# Patient Record
Sex: FEMALE | Race: WHITE | Employment: FULL TIME | URBAN - METROPOLITAN AREA
[De-identification: names, ages, dates, MRNs, and addresses within clinical notes are randomized per-mention and may not be internally consistent; named-entity substitution may affect disease eponyms.]

---

## 2017-01-04 ENCOUNTER — TELEPHONE (OUTPATIENT)
Dept: NEUROLOGY | Age: 52
End: 2017-01-04

## 2017-01-09 ENCOUNTER — DOCUMENTATION ONLY (OUTPATIENT)
Dept: NEUROLOGY | Age: 52
End: 2017-01-09

## 2017-01-16 ENCOUNTER — TELEPHONE (OUTPATIENT)
Dept: NEUROLOGY | Age: 52
End: 2017-01-16

## 2017-01-16 NOTE — TELEPHONE ENCOUNTER
Spoke with Sabas Baum at [employer]. Botox 200 units  is scheduled for delivery on 1/18/17.

## 2017-01-18 ENCOUNTER — DOCUMENTATION ONLY (OUTPATIENT)
Dept: NEUROLOGY | Age: 52
End: 2017-01-18

## 2017-01-19 ENCOUNTER — OFFICE VISIT (OUTPATIENT)
Dept: NEUROLOGY | Age: 52
End: 2017-01-19

## 2017-01-19 VITALS
DIASTOLIC BLOOD PRESSURE: 62 MMHG | WEIGHT: 179 LBS | RESPIRATION RATE: 20 BRPM | BODY MASS INDEX: 30.56 KG/M2 | HEIGHT: 64 IN | SYSTOLIC BLOOD PRESSURE: 110 MMHG

## 2017-01-19 NOTE — PROGRESS NOTES
[address]  OFFICE PROCEDURE PROGRESS NOTE        Chart reviewed for the following:   Neel Baxter MD, have reviewed the History, Physical and updated the Allergic reactions for [address] performed immediately prior to start of procedure:   I, Zoila Romo MD, have performed the following reviews on Olita Libman prior to the start of the procedure:            * Patient was identified by name and date of birth   * Agreement on procedure being performed was verified  * Risks and Benefits explained to the patient  * Procedure site verified and marked as necessary  * Patient was positioned for comfort  * Consent was signed and verified     Time: 09 20      Date of procedure: 1/19/2017    Procedure performed by:  Zoila Romo MD    Provider assisted by: None    Patient assisted by: None    How tolerated by patient: tolerated the procedure well with no complications    Post Procedural Pain Scale: 4 - Hurts Little More    Comments: None      Botox Injection Note       Indication: patient has chronic recurrent migraine, has 7-10 less migraine days per month with botox injections, previously failed depakote, topamax, lyrica, SSRIs, nortriptyline, and nicardipine (Ca ch blocker)    Procedure:   Botox concentration: 200 units in 4 ml of preservative-free normal saline. 31 sites injections, distribution as follow      Units/site  Sites Sides Subtotal    Procerus 5 1 1 5    5 1 2 10   Frontalis 5 2 2 20   Temporalis 5 4 2 40   Occipitalis 5 3 2 30   Upper cervical paraspinalis 5 2 2 20   Trapezius 5 3 2 30         200 units Botox were reconstituted, 155 units injected as above and the remainder was unavoidably wasted.      Patient tolerated procedure well.     _____________________________   Clover Pennington M.D.

## 2017-01-19 NOTE — MR AVS SNAPSHOT
Visit Information Date & Time Provider Department Dept. Phone Encounter #  
 1/19/2017  9:20 AM Collette Gaucher, MD [facility] [phone] [id number] Your Appointments 11/15/2017 10:30 AM  
Follow Up with Suzanne Andrade NP [facility] [address]) Appt Note: f/u migraines, $0cp, jrb 11/16/16  
 [address] [place] [address], 
[address] [place] [address], [address] [phone]  
[address] [facility], [address] [facility], [address] [phone] Upcoming Health Maintenance Date Due  
 PAP AKA CERVICAL CYTOLOGY 7/11/2019 DTaP/Tdap/Td series (2 - Td) 10/24/2022 Allergies as of 1/19/2017  Review Complete On: 1/19/2017 By: Jeff Laguna LPN Severity Noted Reaction Type Reactions Monistat 1 [Tioconazole]  10/24/2012    Swelling Prozac [Fluoxetine]  10/24/2016   Side Effect Other (comments) \"felt like having a heart attack\" Sulfa Dyne  10/24/2012    Nausea and Vomiting Current Immunizations  Never Reviewed Name Date Influenza Vaccine 10/28/2016 TDAP Vaccine 10/24/2012  6:47 PM  
  
 Not reviewed this visit Vitals BP Resp Height(growth percentile) Weight(growth percentile) BMI OB Status 110/62 20 5' 4\" (1.626 m) 179 lb (81.2 kg) 30.73 kg/m2 Hysterectomy Smoking Status Former Smoker BMI and BSA Data Body Mass Index Body Surface Area 30.73 kg/m 2 1.91 m 2 Preferred Pharmacy Pharmacy Name Phone [phone], [address] [phone] Your Updated Medication List  
  
   
This list is accurate as of: 1/19/17 10:11 AM.  Always use your most recent med list.  
  
  
  
  
 baclofen 10 mg tablet Commonly known as:  LIORESAL  
take 1 tablet by mouth three times a day  
  
 citalopram 20 mg tablet Commonly known as:  [place] Party Take 1 Tab by mouth daily. FISH OIL 1,000 mg Cap Generic drug:  omega-3 fatty acids-vitamin e Take 1 Cap by mouth.  
  
 levothyroxine 75 mcg tablet Commonly known as:  SYNTHROID  
TAKE 1 TABLET BY MOUTH ONCE DAILY BEFORE BREAKFAST LINZESS 145 mcg Cap capsule Generic drug:  linaclotide TAKE 1 CAPSULE BY MOUTH ONCE DAILY BEFORE BREAKFAST. LYRICA 100 mg capsule Generic drug:  pregabalin TAKE 1 CAPSULE BY MOUTH TWICE DAILY  
  
 niCARdipine 30 mg capsule Commonly known as:  CARDENE  
take 1 capsule by mouth three times a day  
  
 onabotulinumtoxinA 200 unit injection Commonly known as:  BOTOX Inject 200 units IM over 31 sites at head and neck every 12 weeks. Migraine Prevention. Inject IM neck/face , 31 FDA approved sites  Indications: MIGRAINE PREVENTION  
  
 SUMAtriptan 100 mg tablet Commonly known as:  IMITREX  
take 1/2 to 1 tablet by mouth AT ONSET OF HEADACHE. REPEAT AT 2 HOURS IF NEEDED, MAX 200MG IN 24 HOURS  
  
 TROKENDI XR 50 mg capsule Generic drug:  topiramate ER  
TAKE 1 CAPSULE BY MOUTH ONCE DAILY. Patient Instructions OnabotulinumtoxinA (By injection) OnabotulinumtoxinA (ef-s-kfn-rg-MSI-hyx-tox-in-ay) Treats muscle stiffness, muscle spasms, excessive sweating, overactive bladder, or loss of bladder control. Prevents chronic migraine headaches. Improves the appearance of wrinkles on the face. Brand Name(s):Botox, Botox Cosmetic There may be other brand names for this medicine. When This Medicine Should Not Be Used: This medicine is not right for everyone. You should not receive this medicine if you had an allergic reaction to onabotulinumtoxinA or any other botulinum toxin product. How to Use This Medicine:  
Injectable · Your doctor will prescribe your exact dose and tell you how often it should be given. This medicine is given by a healthcare provider as a shot under your skin or into a muscle.  
· You may be given medicine to numb the area where the shot will be injected. If you receive the medicine around your eyes, you may be given eye drops or ointment to numb the area. After your injection, you may need to wear a protective contact lens or eye patch. · If you are being treated for excessive sweating, shave your underarms but do not use deodorant for 24 hours before your injection. Avoid exercise, hot foods or liquids, or anything else that could make you sweat for 30 minutes before your injection. · The recommended treatment schedule for chronic migraine is every 12 weeks. · This medicine works slowly. Once your condition has improved, the medicine will last about 3 months, then the effects will slowly go away. You might need more injections to treat your condition. ¨ Muscle spasms in the eyelids should improve within 3 to 10 days. ¨ Eye muscle problems should improve 1 or 2 days after the injection, and the improvement should last for 2 to 6 weeks. ¨ Neck pain should improve within 2 to 6 weeks. ¨ Arm stiffness should improve within 4 to 6 weeks. ¨ Facial lines or wrinkles should improve 1 or 2 days. · This medicine should come with a Medication Guide. Ask your pharmacist for a copy if you do not have one. · Missed dose:Call your doctor or pharmacist for instructions. Drugs and Foods to Avoid: Ask your doctor or pharmacist before using any other medicine, including over-the-counter medicines, vitamins, and herbal products. · Some foods and medicine can affect how onabotulinumtoxinA works. Tell your doctor if you are using any of the following: ¨ Aspirin or a blood thinner (such as ticlopidine, warfarin) ¨ Muscle relaxer ¨ Medicine for an infection (such as amikacin, gentamicin, streptomycin, tobramycin) · Tell your doctor if you have received an injection of any botulinum toxin product within the past 4 months. Warnings While Using This Medicine: · Tell your doctor if you are pregnant or breastfeeding, or if you have breathing or lung problems, bleeding problems, heart or blood vessel disease, or nerve or muscle problems (such as myasthenia gravis). Tell your doctor if you have ever had face surgery or if you have a urinary tract infection or trouble urinating, diabetes, or multiple sclerosis. · This medicine may cause the following problems: ¨ Muscle weakness, loss of bladder control, trouble swallowing, speaking, or breathing (caused by the toxin spreading to other parts of your body) · This medicine may make your muscles weak or cause vision problems. Do not drive or do anything else that could be dangerous until you know how this medicine affects you. · There are some warnings that only apply if you are receiving this medicine to treat the following: ¨ Injections near the eye: This medicine may reduce blinking, which can raise the risk of eye problems such as corneal exposure and ulcers. Tell your doctor right away if you notice that you are blinking less than usual or your eyes feel dry. ¨ Urinary incontinence: This medicine may cause autonomic dysreflexia, which can be a life-threatening condition. ¨ Overactive bladder: Check with your doctor right away if you have trouble urinating or a burning sensation while urinating. · This medicine contains products from donated human blood, so it may contain viruses, although the risk is low. Human donors and blood are always tested for viruses to keep the risk low. Talk with your doctor about this risk if you are concerned. · Your doctor will check your progress and the effects of this medicine at regular visits. Keep all appointments. Possible Side Effects While Using This Medicine:  
Call your doctor right away if you notice any of these side effects: · Allergic reaction: Itching or hives, swelling in your face or hands, swelling or tingling in your mouth or throat, chest tightness, trouble breathing · Blurred or double vision, droopy eyelids · Change in how much or how often you urinate, trouble urinating, or painful urination · Chest pain, slow or uneven heartbeat · Headache, increased sweating, warmth or redness in your face, neck, or arm · Muscle weakness · Trouble swallowing, talking, or breathing If you notice these less serious side effects, talk with your doctor: · Fever, chills, cough, stuffy or runny nose, sore throat, and body aches · Pain in your neck, back, arms, or legs · Redness, pain, tenderness, bruising, swelling, or weakness where the shot was given If you notice other side effects that you think are caused by this medicine, tell your doctor. Call your doctor for medical advice about side effects. You may report side effects to FDA at 5-390-FDA-2768 © 2016 [address] is for End User's use only and may not be sold, redistributed or otherwise used for commercial purposes. The above information is an  only. It is not intended as medical advice for individual conditions or treatments. Talk to your doctor, nurse or pharmacist before following any medical regimen to see if it is safe and effective for you. Introducing [facility]! Dear [facility]: Thank you for requesting a [employer] account. Our records indicate that you already have an active [employer] account. You can access your account anytime at [email or web address] Did you know that you can access your hospital and ER discharge instructions at any time in [employer]? You can also review all of your test results from your hospital stay or ER visit. Additional Information If you have questions, please visit the Frequently Asked Questions section of the [employer] website at [email or web address] Remember, [employer] is NOT to be used for urgent needs. For medical emergencies, dial 911. Now available from your iPhone and Android! Please provide this summary of care documentation to your next provider. Your primary care clinician is listed as Ryann Potts. If you have any questions after today's visit, please call [phone].

## 2017-01-19 NOTE — PATIENT INSTRUCTIONS
OnabotulinumtoxinA (By injection)   OnabotulinumtoxinA (gr-b-pkj-rz-ARW-qel-tox-in-ay)  Treats muscle stiffness, muscle spasms, excessive sweating, overactive bladder, or loss of bladder control. Prevents chronic migraine headaches. Improves the appearance of wrinkles on the face. Brand Name(s):Botox, Botox Cosmetic   There may be other brand names for this medicine. When This Medicine Should Not Be Used: This medicine is not right for everyone. You should not receive this medicine if you had an allergic reaction to onabotulinumtoxinA or any other botulinum toxin product. How to Use This Medicine:   Injectable  · Your doctor will prescribe your exact dose and tell you how often it should be given. This medicine is given by a healthcare provider as a shot under your skin or into a muscle. · You may be given medicine to numb the area where the shot will be injected. If you receive the medicine around your eyes, you may be given eye drops or ointment to numb the area. After your injection, you may need to wear a protective contact lens or eye patch. · If you are being treated for excessive sweating, shave your underarms but do not use deodorant for 24 hours before your injection. Avoid exercise, hot foods or liquids, or anything else that could make you sweat for 30 minutes before your injection. · The recommended treatment schedule for chronic migraine is every 12 weeks. · This medicine works slowly. Once your condition has improved, the medicine will last about 3 months, then the effects will slowly go away. You might need more injections to treat your condition. ¨ Muscle spasms in the eyelids should improve within 3 to 10 days. ¨ Eye muscle problems should improve 1 or 2 days after the injection, and the improvement should last for 2 to 6 weeks. ¨ Neck pain should improve within 2 to 6 weeks. ¨ Arm stiffness should improve within 4 to 6 weeks.   ¨ Facial lines or wrinkles should improve 1 or 2 days.  · This medicine should come with a Medication Guide. Ask your pharmacist for a copy if you do not have one. · Missed dose:Call your doctor or pharmacist for instructions. Drugs and Foods to Avoid:   Ask your doctor or pharmacist before using any other medicine, including over-the-counter medicines, vitamins, and herbal products. · Some foods and medicine can affect how onabotulinumtoxinA works. Tell your doctor if you are using any of the following:  ¨ Aspirin or a blood thinner (such as ticlopidine, warfarin)  ¨ Muscle relaxer  ¨ Medicine for an infection (such as amikacin, gentamicin, streptomycin, tobramycin)  · Tell your doctor if you have received an injection of any botulinum toxin product within the past 4 months. Warnings While Using This Medicine:   · Tell your doctor if you are pregnant or breastfeeding, or if you have breathing or lung problems, bleeding problems, heart or blood vessel disease, or nerve or muscle problems (such as myasthenia gravis). Tell your doctor if you have ever had face surgery or if you have a urinary tract infection or trouble urinating, diabetes, or multiple sclerosis. · This medicine may cause the following problems:  ¨ Muscle weakness, loss of bladder control, trouble swallowing, speaking, or breathing (caused by the toxin spreading to other parts of your body)  · This medicine may make your muscles weak or cause vision problems. Do not drive or do anything else that could be dangerous until you know how this medicine affects you. · There are some warnings that only apply if you are receiving this medicine to treat the following:   ¨ Injections near the eye: This medicine may reduce blinking, which can raise the risk of eye problems such as corneal exposure and ulcers. Tell your doctor right away if you notice that you are blinking less than usual or your eyes feel dry. ¨ Urinary incontinence:  This medicine may cause autonomic dysreflexia, which can be a life-threatening condition. ¨ Overactive bladder: Check with your doctor right away if you have trouble urinating or a burning sensation while urinating. · This medicine contains products from donated human blood, so it may contain viruses, although the risk is low. Human donors and blood are always tested for viruses to keep the risk low. Talk with your doctor about this risk if you are concerned. · Your doctor will check your progress and the effects of this medicine at regular visits. Keep all appointments. Possible Side Effects While Using This Medicine:   Call your doctor right away if you notice any of these side effects:  · Allergic reaction: Itching or hives, swelling in your face or hands, swelling or tingling in your mouth or throat, chest tightness, trouble breathing  · Blurred or double vision, droopy eyelids  · Change in how much or how often you urinate, trouble urinating, or painful urination  · Chest pain, slow or uneven heartbeat  · Headache, increased sweating, warmth or redness in your face, neck, or arm  · Muscle weakness  · Trouble swallowing, talking, or breathing  If you notice these less serious side effects, talk with your doctor:   · Fever, chills, cough, stuffy or runny nose, sore throat, and body aches  · Pain in your neck, back, arms, or legs  · Redness, pain, tenderness, bruising, swelling, or weakness where the shot was given  If you notice other side effects that you think are caused by this medicine, tell your doctor. Call your doctor for medical advice about side effects. You may report side effects to FDA at 5-028-FDA-7506  © 2016 [address] is for End User's use only and may not be sold, redistributed or otherwise used for commercial purposes. The above information is an  only. It is not intended as medical advice for individual conditions or treatments.  Talk to your doctor, nurse or pharmacist before following any medical regimen to see if it is safe and effective for you.

## 2017-01-19 NOTE — PROGRESS NOTES
Pain scale prior to procedure 4  /10  Pain scale post procedure   4  /10  Patient states 30  headaches a month & lasting  24  hrs  Consent signed     Instructions post injection discussed with patient   1. Do not lay flat for 4 hrs  2. Do not press on injection sites up to 24 hrs.     Patient verbalizes understanding

## 2017-01-26 RX ORDER — LEVOTHYROXINE SODIUM 75 UG/1
TABLET ORAL
Qty: 30 TAB | Refills: 3 | Status: SHIPPED | OUTPATIENT
Start: 2017-01-26 | End: 2017-05-24 | Stop reason: SDUPTHER

## 2017-02-17 ENCOUNTER — OFFICE VISIT (OUTPATIENT)
Dept: INTERNAL MEDICINE CLINIC | Age: 52
End: 2017-02-17

## 2017-02-17 VITALS
WEIGHT: 179.4 LBS | DIASTOLIC BLOOD PRESSURE: 78 MMHG | OXYGEN SATURATION: 100 % | HEART RATE: 63 BPM | BODY MASS INDEX: 30.63 KG/M2 | TEMPERATURE: 98.2 F | SYSTOLIC BLOOD PRESSURE: 122 MMHG | HEIGHT: 64 IN | RESPIRATION RATE: 19 BRPM

## 2017-02-17 DIAGNOSIS — J30.9 ALLERGIC RHINITIS, UNSPECIFIED ALLERGIC RHINITIS TRIGGER, UNSPECIFIED RHINITIS SEASONALITY: ICD-10-CM

## 2017-02-17 DIAGNOSIS — S93.502A SPRAIN OF LEFT GREAT TOE, INITIAL ENCOUNTER: ICD-10-CM

## 2017-02-17 DIAGNOSIS — Z13.820 SCREENING FOR OSTEOPOROSIS: ICD-10-CM

## 2017-02-17 DIAGNOSIS — E03.9 ACQUIRED HYPOTHYROIDISM: ICD-10-CM

## 2017-02-17 DIAGNOSIS — M54.42 LEFT-SIDED LOW BACK PAIN WITH LEFT-SIDED SCIATICA, UNSPECIFIED CHRONICITY: Primary | ICD-10-CM

## 2017-02-17 DIAGNOSIS — M54.42 ACUTE BACK PAIN WITH SCIATICA, LEFT: Primary | ICD-10-CM

## 2017-02-17 RX ORDER — MONTELUKAST SODIUM 10 MG/1
10 TABLET ORAL DAILY
Qty: 30 TAB | Refills: 5 | Status: SHIPPED | OUTPATIENT
Start: 2017-02-17 | End: 2017-08-24 | Stop reason: SDUPTHER

## 2017-02-17 RX ORDER — METHYLPREDNISOLONE 4 MG/1
4 TABLET ORAL
Qty: 1 DOSE PACK | Refills: 0 | Status: SHIPPED | OUTPATIENT
Start: 2017-02-17 | End: 2017-07-12

## 2017-02-17 RX ORDER — TRIAMCINOLONE ACETONIDE 40 MG/ML
60 INJECTION, SUSPENSION INTRA-ARTICULAR; INTRAMUSCULAR ONCE
Qty: 1 ML | Refills: 0
Start: 2017-02-17 | End: 2017-02-19

## 2017-02-17 NOTE — PROGRESS NOTES
Reviewed record in preparation for visit and have obtained necessary documentation. Identified pt with two pt identifiers(name and ).     Chief Complaint   Patient presents with    Toe Injury     pt states she broke her pinky toe on left foot wants to discuss getting DEXA scan  states \" broken too many bones\"    Back Pain     c/o of left side lower pain radiating to left lower leg pt states \" can not sit for long due to pain\"           Coordination of Care Questionnaire:  :     1) Have you been to an emergency room, urgent care clinic since your last visit? no   Hospitalized since your last visit? no             2) Have you seen or consulted any other health care providers outside of [address] [place] [address] since your last visit? no  (Include any pap smears or colon screenings in this section.)

## 2017-02-17 NOTE — MR AVS SNAPSHOT
Visit Information Date & Time Provider Department Dept. Phone Encounter #  
 2/17/2017  3:15 PM Lexi Bolaños, [address] [id number] Follow-up Instructions Return if symptoms worsen or fail to improve. Your Appointments 4/20/2017  9:20 AM  
PROCEDURE with Alfred Scott MD  
[facility]) Appt Note: procedure Botox $ 0 CP jll 1/23/17  
 [address] [place] [address], 
[address] [facility], [address] [address]  
[address], [address] [place] [address] Avenue, [address] [address]  
  
    
 7/12/2017  8:30 AM  
PHYSICAL PRE OP with Lexi Bolaños MD  
[facility]) Appt Note: CPE W/PAP last cpe 7/11/16  
 [facility] [address] [address]  
[address] [facility] [address] [facility]  
  
    
 11/15/2017 10:30 AM  
Follow Up with Damion Aguilar NP [facility]) Appt Note: f/u migraines, $0cp, jrb 11/16/16  
 [address] [place] [address], 
[address] [place] [address], [address] [address]  
[address], [address] [place] [address] Avenue, [address] [address] Upcoming Health Maintenance Date Due  
 PAP AKA CERVICAL CYTOLOGY 7/11/2019 DTaP/Tdap/Td series (2 - Td) 10/24/2022 Allergies as of 2/17/2017  Review Complete On: 2/17/2017 By: Lexi Bolaños MD  
  
 Severity Noted Reaction Type Reactions Monistat 1 [Tioconazole]  10/24/2012    Swelling Prozac [Fluoxetine]  10/24/2016   Side Effect Other (comments) \"felt like having a heart attack\" Sulfa Dyne  10/24/2012    Nausea and Vomiting Current Immunizations  Never Reviewed Name Date Influenza Vaccine 10/28/2016 TDAP Vaccine 10/24/2012  6:47 PM  
  
 Not reviewed this visit You Were Diagnosed With   
  
 Codes Comments Left-sided low back pain with left-sided sciatica, unspecified chronicity    -  Primary ICD-10-CM: M54.42 
ICD-9-CM: 724.3 Sprain of left great toe, initial encounter     ICD-10-CM: Z41.549P ICD-9-CM: 845.10 Screening for osteoporosis     ICD-10-CM: Z13.820 ICD-9-CM: V82.81 Acquired hypothyroidism     ICD-10-CM: E03.9 ICD-9-CM: 244.9 Allergic rhinitis, unspecified allergic rhinitis trigger, unspecified rhinitis seasonality     ICD-10-CM: J30.9 ICD-9-CM: 477.9 Vitals BP Pulse Temp Resp Height(growth percentile) Weight(growth percentile) 122/78 (BP 1 Location: Left arm, BP Patient Position: Sitting) 63 98.2 °F (36.8 °C) (Oral) 19 5' 4\" (1.626 m) 179 lb 6.4 oz (81.4 kg) SpO2 BMI OB Status Smoking Status 100% 30.79 kg/m2 Hysterectomy Former Smoker Vitals History BMI and BSA Data Body Mass Index Body Surface Area 30.79 kg/m 2 1.92 m 2 Preferred Pharmacy Pharmacy Name Phone [phone], [address] [phone] Your Updated Medication List  
  
   
This list is accurate as of: 2/17/17  4:41 PM.  Always use your most recent med list.  
  
  
  
  
 baclofen 10 mg tablet Commonly known as:  LIORESAL  
take 1 tablet by mouth three times a day  
  
 citalopram 20 mg tablet Commonly known as:  Janelle Sol Take 1 Tab by mouth daily. FISH OIL 1,000 mg Cap Generic drug:  omega-3 fatty acids-vitamin e Take 1 Cap by mouth.  
  
 levothyroxine 75 mcg tablet Commonly known as:  SYNTHROID  
TAKE 1 TABLET BY MOUTH ONCE DAILY BEFORE BREAKFAST. LINZESS 145 mcg Cap capsule Generic drug:  linaclotide TAKE 1 CAPSULE BY MOUTH ONCE DAILY BEFORE BREAKFAST. LYRICA 100 mg capsule Generic drug:  pregabalin TAKE 1 CAPSULE BY MOUTH TWICE DAILY  
  
 methylPREDNISolone 4 mg tablet Commonly known as:  Shilho Nilda Take 1 Tab by mouth Specific Days and Specific Times. montelukast 10 mg tablet Commonly known as:  SINGULAIR Take 1 Tab by mouth daily. niCARdipine 30 mg capsule Commonly known as:  CARDENE  
take 1 capsule by mouth three times a day * onabotulinumtoxinA 200 unit injection Commonly known as:  BOTOX Inject 200 units IM over 31 sites at head and neck every 12 weeks. Migraine Prevention. Inject IM neck/face , 31 FDA approved sites  Indications: MIGRAINE PREVENTION  
  
 * onabotulinumtoxinA 200 unit injection Commonly known as:  BOTOX Inject 200 units IM 31 FDA approved sites face/neck every 12 weeks SUMAtriptan 100 mg tablet Commonly known as:  IMITREX  
take 1/2 to 1 tablet by mouth AT ONSET OF HEADACHE. REPEAT AT 2 HOURS IF NEEDED, MAX 200MG IN 24 HOURS  
  
 triamcinolone acetonide 40 mg/mL injection Commonly known as:  KENALOG  
1.5 mL by IntraMUSCular route once for 1 dose. TROKENDI XR 50 mg capsule Generic drug:  topiramate ER  
TAKE 1 CAPSULE BY MOUTH ONCE DAILY. * Notice: This list has 2 medication(s) that are the same as other medications prescribed for you. Read the directions carefully, and ask your doctor or other care provider to review them with you. Prescriptions Sent to Pharmacy Refills  
 methylPREDNISolone (MEDROL DOSEPACK) 4 mg tablet 0 Sig: Take 1 Tab by mouth Specific Days and Specific Times. Class: Normal  
 Pharmacy: [address] Ph #: [phone] Route: Oral  
 montelukast (SINGULAIR) 10 mg tablet 5 Sig: Take 1 Tab by mouth daily. Class: Normal  
 Pharmacy: [address] Ph #: [phone] Route: Oral  
  
We Performed the Following TRIAMCINOLONE ACETONIDE INJ [ [facility]] Follow-up Instructions Return if symptoms worsen or fail to improve. To-Do List   
 02/17/2017 Imaging:  DEXA BONE DENSITY STUDY AXIAL   
  
 02/17/2017 Imaging:  XR SPINE LUMB 2 OR 3 V Referral Information Referral ID Referred By Referred To  
  
 [id number] Danilo Guzman Not Available Visits Status Start Date End Date 1 New Request 2/17/17 2/17/18 If your referral has a status of pending review or denied, additional information will be sent to support the outcome of this decision. Patient Instructions Sacroiliac Pain: Exercises Your Care Instructions Here are some examples of typical rehabilitation exercises for your condition. Start each exercise slowly. Ease off the exercise if you start to have pain. Your doctor or physical therapist will tell you when you can start these exercises and which ones will work best for you. How to do the exercises Knee-to-chest stretch Do not do the knee-to-chest exercise if it causes or increases back or leg pain. 1. Lie on your back with your knees bent and your feet flat on the floor. You can put a small pillow under your head and neck if it is more comfortable. 2. Grasp your hands under one knee and bring the knee to your chest, keeping the other foot flat on the floor. 3. Keep your lower back pressed to the floor. Hold for at least 15 to 30 seconds. 4. Relax and lower the knee to the starting position. Repeat with the other leg. 5. Repeat 2 to 4 times with each leg. 6. To get more stretch, keep your other leg flat on the floor while pulling your knee to your chest. 
Bridging 1. Lie on your back with both knees bent. Your knees should be bent about 90 degrees. 2. Tighten your belly muscles by pulling in your belly button toward your spine. Then push your feet into the floor, squeeze your buttocks, and lift your hips off the floor until your shoulders, hips, and knees are all in a straight line. 3. Hold for about 6 seconds as you continue to breathe normally, and then slowly lower your hips back down to the floor and rest for up to 10 seconds. 4. Repeat 8 to 12 times. Hip extension 1. Get down on your hands and knees on the floor. 2. Keeping your back and neck straight, lift one leg straight out behind you. When you lift your leg, keep your hips level. Don't let your back twist, and don't let your hip drop toward the floor. 3. Hold for 6 seconds. Repeat 8 to 12 times with each leg. 4. If you feel steady and strong when you do this exercise, you can make it more difficult. To do this, when you lift your leg, also lift the opposite arm straight out in front of you. For example, lift the left leg and the right arm at the same time. (This is sometimes called the \"bird dog exercise. \") Hold for 6 seconds, and repeat 8 to 12 times on each side. Clamshell 1. Lie on your side with a pillow under your head. Keep your feet and knees together and your knees bent. 2. Raise your top knee, but keep your feet together. Do not let your hips roll back. Your legs should open up like a clamshell. 3. Hold for 6 seconds. 4. Slowly lower your knee back down. Rest for 10 seconds. 5. Repeat 8 to 12 times. 6. Switch to your other side and repeat steps 1 through 5. Hamstring wall stretch 1. Lie on your back in a doorway, with one leg through the open door. 2. Slide your affected leg up the wall to straighten your knee. You should feel a gentle stretch down the back of your leg. ¨ Do not arch your back. ¨ Do not bend either knee. ¨ Keep one heel touching the floor and the other heel touching the wall. Do not point your toes. 3. Hold the stretch for at least 1 minute to begin. Then try to lengthen the time you hold the stretch to as long as 6 minutes. 4. Switch legs, and repeat steps 1 through 3. 
5. Repeat 2 to 4 times. If you do not have a place to do this exercise in a doorway, there is another way to do it: 1. Lie on your back, and bend one knee. 2. Loop a towel under the ball and toes of that foot, and hold the ends of the towel in your hands. 3. Straighten your knee, and slowly pull back on the towel. You should feel a gentle stretch down the back of your leg. 4. Switch legs, and repeat steps 1 through 3. 
5. Repeat 2 to 4 times. Lower abdominal strengthening 1. Lie on your back with your knees bent and your feet flat on the floor. 2. Tighten your belly muscles by pulling your belly button in toward your spine. 3. Lift one foot off the floor and bring your knee toward your chest, so that your knee is straight above your hip and your leg is bent like the letter \"L. \" 
4. Lift the other knee up to the same position. 5. Lower one leg at a time to the starting position. 6. Keep alternating legs until you have lifted each leg 8 to 12 times. 7. Be sure to keep your belly muscles tight and your back still as you are moving your legs. Be sure to breathe normally. Piriformis stretch 1. Lie on your back with your legs straight. 2. Lift your affected leg, and bend your knee. With your opposite hand, reach across your body, and then gently pull your knee toward your opposite shoulder. 3. Hold the stretch for 15 to 30 seconds. 4. Switch legs and repeat steps 1 through 3. 
5. Repeat 2 to 4 times. Follow-up care is a key part of your treatment and safety. Be sure to make and go to all appointments, and call your doctor if you are having problems. It's also a good idea to know your test results and keep a list of the medicines you take. Where can you learn more? Go to http://kunal-iggy.info/. Enter R490 in the search box to learn more about \"Sacroiliac Pain: Exercises. \" Current as of: May 23, 2016 Content Version: 11.1 © 2158-0589 [employer], [employer]. Care instructions adapted under license by [employer] (which disclaims liability or warranty for this information).  If you have questions about a medical condition or this instruction, always ask your healthcare professional. [employer] disclaims any warranty or liability for your use of this information. Introducing [facility]! Dear Flip Nichole: Thank you for requesting a Spodly account. Our records indicate that you already have an active Spodly account. You can access your account anytime at [email or web address] Did you know that you can access your hospital and ER discharge instructions at any time in Spodly? You can also review all of your test results from your hospital stay or ER visit. Additional Information If you have questions, please visit the Frequently Asked Questions section of the Spodly website at [email or web address] Remember, Spodly is NOT to be used for urgent needs. For medical emergencies, dial 911. Now available from your iPhone and Android! Please provide this summary of care documentation to your next provider. Your primary care clinician is listed as Sydney Plants. If you have any questions after today's visit, please call [phone].

## 2017-02-17 NOTE — PATIENT INSTRUCTIONS
Sacroiliac Pain: Exercises  Your Care Instructions  Here are some examples of typical rehabilitation exercises for your condition. Start each exercise slowly. Ease off the exercise if you start to have pain. Your doctor or physical therapist will tell you when you can start these exercises and which ones will work best for you. How to do the exercises  Knee-to-chest stretch    Do not do the knee-to-chest exercise if it causes or increases back or leg pain. 1. Lie on your back with your knees bent and your feet flat on the floor. You can put a small pillow under your head and neck if it is more comfortable. 2. Grasp your hands under one knee and bring the knee to your chest, keeping the other foot flat on the floor. 3. Keep your lower back pressed to the floor. Hold for at least 15 to 30 seconds. 4. Relax and lower the knee to the starting position. Repeat with the other leg. 5. Repeat 2 to 4 times with each leg. 6. To get more stretch, keep your other leg flat on the floor while pulling your knee to your chest.  Bridging    1. Lie on your back with both knees bent. Your knees should be bent about 90 degrees. 2. Tighten your belly muscles by pulling in your belly button toward your spine. Then push your feet into the floor, squeeze your buttocks, and lift your hips off the floor until your shoulders, hips, and knees are all in a straight line. 3. Hold for about 6 seconds as you continue to breathe normally, and then slowly lower your hips back down to the floor and rest for up to 10 seconds. 4. Repeat 8 to 12 times. Hip extension    1. Get down on your hands and knees on the floor. 2. Keeping your back and neck straight, lift one leg straight out behind you. When you lift your leg, keep your hips level. Don't let your back twist, and don't let your hip drop toward the floor. 3. Hold for 6 seconds. Repeat 8 to 12 times with each leg.   4. If you feel steady and strong when you do this exercise, you can make it more difficult. To do this, when you lift your leg, also lift the opposite arm straight out in front of you. For example, lift the left leg and the right arm at the same time. (This is sometimes called the \"bird dog exercise. \") Hold for 6 seconds, and repeat 8 to 12 times on each side. Clamshell    1. Lie on your side with a pillow under your head. Keep your feet and knees together and your knees bent. 2. Raise your top knee, but keep your feet together. Do not let your hips roll back. Your legs should open up like a clamshell. 3. Hold for 6 seconds. 4. Slowly lower your knee back down. Rest for 10 seconds. 5. Repeat 8 to 12 times. 6. Switch to your other side and repeat steps 1 through 5. Hamstring wall stretch    1. Lie on your back in a doorway, with one leg through the open door. 2. Slide your affected leg up the wall to straighten your knee. You should feel a gentle stretch down the back of your leg. ¨ Do not arch your back. ¨ Do not bend either knee. ¨ Keep one heel touching the floor and the other heel touching the wall. Do not point your toes. 3. Hold the stretch for at least 1 minute to begin. Then try to lengthen the time you hold the stretch to as long as 6 minutes. 4. Switch legs, and repeat steps 1 through 3.  5. Repeat 2 to 4 times. If you do not have a place to do this exercise in a doorway, there is another way to do it:  1. Lie on your back, and bend one knee. 2. Loop a towel under the ball and toes of that foot, and hold the ends of the towel in your hands. 3. Straighten your knee, and slowly pull back on the towel. You should feel a gentle stretch down the back of your leg. 4. Switch legs, and repeat steps 1 through 3.  5. Repeat 2 to 4 times. Lower abdominal strengthening    1. Lie on your back with your knees bent and your feet flat on the floor. 2. Tighten your belly muscles by pulling your belly button in toward your spine.   3. Lift one foot off the floor and bring your knee toward your chest, so that your knee is straight above your hip and your leg is bent like the letter \"L. \"  4. Lift the other knee up to the same position. 5. Lower one leg at a time to the starting position. 6. Keep alternating legs until you have lifted each leg 8 to 12 times. 7. Be sure to keep your belly muscles tight and your back still as you are moving your legs. Be sure to breathe normally. Piriformis stretch    1. Lie on your back with your legs straight. 2. Lift your affected leg, and bend your knee. With your opposite hand, reach across your body, and then gently pull your knee toward your opposite shoulder. 3. Hold the stretch for 15 to 30 seconds. 4. Switch legs and repeat steps 1 through 3.  5. Repeat 2 to 4 times. Follow-up care is a key part of your treatment and safety. Be sure to make and go to all appointments, and call your doctor if you are having problems. It's also a good idea to know your test results and keep a list of the medicines you take. Where can you learn more? Go to http://kunal-iggy.info/. Enter D118 in the search box to learn more about \"Sacroiliac Pain: Exercises. \"  Current as of: May 23, 2016  Content Version: 11.1  © 8662-8311 [employer]. Care instructions adapted under license by [employer] (which disclaims liability or warranty for this information). If you have questions about a medical condition or this instruction, always ask your healthcare professional. [address] any warranty or liability for your use of this information.

## 2017-02-27 RX ORDER — CITALOPRAM 20 MG/1
20 TABLET, FILM COATED ORAL DAILY
Qty: 30 TAB | Refills: 3 | Status: SHIPPED | OUTPATIENT
Start: 2017-02-27 | End: 2017-06-21 | Stop reason: SDUPTHER

## 2017-02-27 NOTE — TELEPHONE ENCOUNTER
Requested Prescriptions     Pending Prescriptions Disp Refills    citalopram (CELEXA) 20 mg tablet 30 Tab 3     Sig: Take 1 Tab by mouth daily.      Last OV-2/17/17  Next OV-7/12/17  Med refilled-10/24/16

## 2017-03-02 RX ORDER — PREGABALIN 100 MG/1
CAPSULE ORAL
Qty: 60 CAP | Refills: 3 | Status: SHIPPED | OUTPATIENT
Start: 2017-03-02 | End: 2017-07-09 | Stop reason: SDUPTHER

## 2017-03-03 ENCOUNTER — DOCUMENTATION ONLY (OUTPATIENT)
Dept: NEUROLOGY | Age: 52
End: 2017-03-03

## 2017-03-21 ENCOUNTER — TELEPHONE (OUTPATIENT)
Dept: NEUROLOGY | Age: 52
End: 2017-03-21

## 2017-03-21 NOTE — TELEPHONE ENCOUNTER
Botox   Auth  dt eff: 12/16/16-12/18/17  Botox [id number] Auth  dt eff:  1/17/17 -1/17/18   SPP is [facility]  [phone]    Patient is scheduled for 4/20/17. Destiney Docker to call in Rx for shipment.

## 2017-03-29 ENCOUNTER — TELEPHONE (OUTPATIENT)
Dept: INTERNAL MEDICINE CLINIC | Age: 52
End: 2017-03-29

## 2017-03-29 DIAGNOSIS — G43.809 OTHER MIGRAINE WITHOUT STATUS MIGRAINOSUS, NOT INTRACTABLE: ICD-10-CM

## 2017-03-29 RX ORDER — SUMATRIPTAN 100 MG/1
TABLET, FILM COATED ORAL
Qty: 9 TAB | Refills: 3 | Status: SHIPPED | OUTPATIENT
Start: 2017-03-29 | End: 2017-10-09 | Stop reason: SDUPTHER

## 2017-03-29 NOTE — TELEPHONE ENCOUNTER
Pt needs to know if you have the injection for sciatica pain yet and can she schedule that?   Please call

## 2017-03-31 ENCOUNTER — OFFICE VISIT (OUTPATIENT)
Dept: INTERNAL MEDICINE CLINIC | Age: 52
End: 2017-03-31

## 2017-03-31 VITALS
WEIGHT: 180.2 LBS | HEIGHT: 64 IN | TEMPERATURE: 98.5 F | HEART RATE: 80 BPM | OXYGEN SATURATION: 99 % | RESPIRATION RATE: 18 BRPM | DIASTOLIC BLOOD PRESSURE: 69 MMHG | SYSTOLIC BLOOD PRESSURE: 124 MMHG | BODY MASS INDEX: 30.77 KG/M2

## 2017-03-31 DIAGNOSIS — J32.9 SINUSITIS, UNSPECIFIED CHRONICITY, UNSPECIFIED LOCATION: ICD-10-CM

## 2017-03-31 DIAGNOSIS — M54.32 LEFT SIDED SCIATICA: Primary | ICD-10-CM

## 2017-03-31 RX ORDER — TRIAMCINOLONE ACETONIDE 40 MG/ML
60 INJECTION, SUSPENSION INTRA-ARTICULAR; INTRAMUSCULAR ONCE
Qty: 1 ML | Refills: 0
Start: 2017-03-31 | End: 2017-03-31

## 2017-03-31 NOTE — MR AVS SNAPSHOT
Visit Information Date & Time Provider Department Dept. Phone Encounter #  
 3/31/2017  1:30 PM Oumar Geronimo, [address] [place] [address],[address] [phone] [id number] Follow-up Instructions Return if symptoms worsen or fail to improve. Your Appointments 4/20/2017  9:20 AM  
PROCEDURE with Ayesha Ford MD  
Neurology Clinic at [address]) Appt Note: procedure Botox $ 0 CP jll 1/23/17  
 [address] [place] [address], 
[address] [place] [address], [address] [address]  
[address], [address], [address] [address]  
  
    
 7/12/2017  8:30 AM  
PHYSICAL PRE OP with Oumar Geronimo MD  
[facility] [address] [place] [address]) Appt Note: CPE W/PAP last cpe 7/11/16  
 [facility] [address] [address]  
[address] [facility] [address]  
  
    
 11/15/2017 10:30 AM  
Follow Up with Roxann Champion NP Neurology Clinic at [address]) Appt Note: f/u migraines, $0cp, jrb 11/16/16  
 [address] [place] [address], 
[address] [place] [address], [address] [address]  
[address], [address] [place] [address] Avenue, [address] [address] Upcoming Health Maintenance Date Due  
 PAP AKA CERVICAL CYTOLOGY 7/11/2019 DTaP/Tdap/Td series (2 - Td) 10/24/2022 Allergies as of 3/31/2017  Review Complete On: 3/31/2017 By: Oumar Geronimo MD  
  
 Severity Noted Reaction Type Reactions Monistat 1 [Tioconazole]  10/24/2012    Swelling Prozac [Fluoxetine]  10/24/2016   Side Effect Other (comments) \"felt like having a heart attack\" Sulfa Dyne  10/24/2012    Nausea and Vomiting Current Immunizations  Never Reviewed Name Date Influenza Vaccine 10/28/2016 TDAP Vaccine 10/24/2012  6:47 PM  
  
 Not reviewed this visit You Were Diagnosed With   
  
 Codes Comments Left sided sciatica    -  Primary ICD-10-CM: M54.32 
ICD-9-CM: 724.3 Sinusitis, unspecified chronicity, unspecified location     ICD-10-CM: J32.9 ICD-9-CM: 473.9 Vitals BP Pulse Temp Resp Height(growth percentile) Weight(growth percentile) 124/69 (BP 1 Location: Left arm, BP Patient Position: Sitting) 80 98.5 °F (36.9 °C) (Oral) 18 5' 4\" (1.626 m) 180 lb 3.2 oz (81.7 kg) SpO2 BMI OB Status Smoking Status 99% 30.93 kg/m2 Hysterectomy Former Smoker Vitals History BMI and BSA Data Body Mass Index Body Surface Area 30.93 kg/m 2 1.92 m 2 Preferred Pharmacy Pharmacy Name Phone [phone], [address] [phone] Your Updated Medication List  
  
   
This list is accurate as of: 3/31/17  2:22 PM.  Always use your most recent med list.  
  
  
  
  
 baclofen 10 mg tablet Commonly known as:  LIORESAL  
take 1 tablet by mouth three times a day  
  
 citalopram 20 mg tablet Commonly known as:  Nevelyn Poke Take 1 Tab by mouth daily. FISH OIL 1,000 mg Cap Generic drug:  omega-3 fatty acids-vitamin e Take 1 Cap by mouth.  
  
 levothyroxine 75 mcg tablet Commonly known as:  SYNTHROID  
TAKE 1 TABLET BY MOUTH ONCE DAILY BEFORE BREAKFAST. LINZESS 145 mcg Cap capsule Generic drug:  linaclotide TAKE 1 CAPSULE BY MOUTH ONCE DAILY BEFORE BREAKFAST. LYRICA 100 mg capsule Generic drug:  pregabalin  
take 1 capsule by mouth twice a day  
  
 methylPREDNISolone 4 mg tablet Commonly known as:  Daivd Bee Take 1 Tab by mouth Specific Days and Specific Times. montelukast 10 mg tablet Commonly known as:  SINGULAIR Take 1 Tab by mouth daily. niCARdipine 30 mg capsule Commonly known as:  CARDENE  
take 1 capsule by mouth three times a day  
  
 onabotulinumtoxinA 200 unit injection Commonly known as:  BOTOX Inject 200 units IM over 31 sites at head and neck every 12 weeks. Migraine Prevention. Inject IM neck/face , 31 FDA approved sites  Indications: MIGRAINE PREVENTION  
  
 SUMAtriptan 100 mg tablet Commonly known as:  IMITREX  
TAKE 1/2 TO 1 TABLET BY MOUTH AT ONSET OF HEADACHE. REPEAT AT 2 HOURS IF NEEDED  
  
 triamcinolone acetonide 40 mg/mL injection Commonly known as:  KENALOG  
1.5 mL by IntraMUSCular route once for 1 dose. TROKENDI XR 50 mg capsule Generic drug:  topiramate ER  
TAKE 1 CAPSULE BY MOUTH ONCE DAILY. We Performed the Following TRIAMCINOLONE ACETONIDE INJ [ [facility] [place] Follow-up Instructions Return if symptoms worsen or fail to improve. Patient Instructions Claritin, allegra, or zyrtec for postnasal drip. Introducing [facility]! Dear Quan Mcmillan: Thank you for requesting a AppGyver account. Our records indicate that you already have an active AppGyver account. You can access your account anytime at [email or web address] Did you know that you can access your hospital and ER discharge instructions at any time in AppGyver? You can also review all of your test results from your hospital stay or ER visit. Additional Information If you have questions, please visit the Frequently Asked Questions section of the AppGyver website at [email or web address] Remember, AppGyver is NOT to be used for urgent needs. For medical emergencies, dial 911. Now available from your iPhone and Android! Please provide this summary of care documentation to your next provider. Your primary care clinician is listed as Marybeth Carpenter. If you have any questions after today's visit, please call [phone].

## 2017-03-31 NOTE — PROGRESS NOTES
Hayden Jay is a 54 y.o. female who complains of left sciatica, flare since last visit. Normal LS spine xray. Doing stretches. Took medrol dose pack, no relief. Denies injury. No leg weakness. No bowel or bladder changes. Took Augmentin for 10 days for sinus infection. The mucous is better. No facial pain. Started with cough. Using singulair, flonase. No fever. No chest congestion. Past Medical History:   Diagnosis Date    Anxiety     Constipation     Essential hypertension     Fibromyalgia     Headache     Headache(784.0)     Hypothyroidism     Joint pain     Obstructive sleep apnea (adult) (pediatric) 5/16/2014    Sinus problem     Sleep trouble     Stress     Tiredness        Family History   Problem Relation Age of Onset    Diabetes Father     Hypertension Father     Cancer Mother      skin    Cancer Maternal Aunt     Cancer Maternal Uncle     Diabetes Maternal Uncle     Diabetes Paternal Aunt     Diabetes Maternal Grandmother     Parkinsonism Maternal Grandmother     Other Paternal Grandmother      amylodosis       Social History     Social History    Marital status:      Spouse name: N/A    Number of children: N/A    Years of education: N/A     Occupational History    Not on file. Social History Main Topics    Smoking status: Former Smoker    Smokeless tobacco: Never Used    Alcohol use Yes    Drug use: No    Sexual activity: Not on file     Other Topics Concern    Not on file     Social History Narrative       Current Outpatient Prescriptions on File Prior to Visit   Medication Sig Dispense Refill    SUMAtriptan (IMITREX) 100 mg tablet TAKE 1/2 TO 1 TABLET BY MOUTH AT ONSET OF HEADACHE. REPEAT AT 2 HOURS IF NEEDED 9 Tab 3    LYRICA 100 mg capsule take 1 capsule by mouth twice a day 60 Cap 3    citalopram (CELEXA) 20 mg tablet Take 1 Tab by mouth daily. 30 Tab 3    montelukast (SINGULAIR) 10 mg tablet Take 1 Tab by mouth daily.  30 Tab 5    levothyroxine (SYNTHROID) 75 mcg tablet TAKE 1 TABLET BY MOUTH ONCE DAILY BEFORE BREAKFAST. 30 Tab 3    onabotulinumtoxinA (BOTOX) 200 unit injection Inject 200 units IM over 31 sites at head and neck every 12 weeks. Migraine Prevention. Inject IM neck/face , 31 FDA approved sites  Indications: MIGRAINE PREVENTION 1 Vial 3    niCARdipine (CARDENE) 30 mg capsule take 1 capsule by mouth three times a day 270 Cap 2    LINZESS 145 mcg cap capsule TAKE 1 CAPSULE BY MOUTH ONCE DAILY BEFORE BREAKFAST. 30 Cap 3    TROKENDI XR 50 mg capsule TAKE 1 CAPSULE BY MOUTH ONCE DAILY. 30 Cap 5    baclofen (LIORESAL) 10 mg tablet take 1 tablet by mouth three times a day 90 Tab 6    omega-3 fatty acids-vitamin e (FISH OIL) 1,000 mg cap Take 1 Cap by mouth.  methylPREDNISolone (MEDROL DOSEPACK) 4 mg tablet Take 1 Tab by mouth Specific Days and Specific Times. 1 Dose Pack 0     No current facility-administered medications on file prior to visit. Review of Systems  Pertinent items are noted in HPI. Objective:     Visit Vitals    /69 (BP 1 Location: Left arm, BP Patient Position: Sitting)    Pulse 80    Temp 98.5 °F (36.9 °C) (Oral)    Resp 18    Ht 5' 4\" (1.626 m)    Wt 180 lb 3.2 oz (81.7 kg)    SpO2 99%    BMI 30.93 kg/m2     Gen: well appearing female  HEENT:   PERRL,normal conjunctiva. External ear and canals normal, TMs no opacification or erythema,  OP no erythema, no exudates, MMM  Neck:  Supple. Thyroid normal size, nontender, without nodules. No masses or LAD  Resp:  No wheezing, no rhonchi, no rales. CV:  RRR, normal S1S2, no murmur. GI: soft, nontender, without masses. No hepatosplenomegaly. Extrem:  +2 pulses, no edema, warm distally  Back- paraspinous muscle pain on palpation, no vertebral pain, able to move on exam table without difficulty  Neuro- alert, normal gait, negative SLR, 5/5 motor in lower extremities, normal sensory      Assessment/Plan:       ICD-10-CM ICD-9-CM    1.  Left sided sciatica M54.32 724.3 TRIAMCINOLONE ACETONIDE INJ      triamcinolone acetonide (KENALOG) 40 mg/mL injection   2. Sinusitis, unspecified chronicity, unspecified location J32.9 473.9        Follow-up Disposition:  Return if symptoms worsen or fail to improve.     Karena Amato MD

## 2017-04-11 ENCOUNTER — TELEPHONE (OUTPATIENT)
Dept: NEUROLOGY | Age: 52
End: 2017-04-11

## 2017-04-12 RX ORDER — BACLOFEN 10 MG/1
TABLET ORAL
Qty: 90 TAB | Refills: 6 | Status: SHIPPED | OUTPATIENT
Start: 2017-04-12 | End: 2017-11-15 | Stop reason: SDUPTHER

## 2017-04-20 ENCOUNTER — OFFICE VISIT (OUTPATIENT)
Dept: NEUROLOGY | Age: 52
End: 2017-04-20

## 2017-04-20 VITALS
HEART RATE: 80 BPM | OXYGEN SATURATION: 97 % | WEIGHT: 177 LBS | BODY MASS INDEX: 30.22 KG/M2 | DIASTOLIC BLOOD PRESSURE: 70 MMHG | HEIGHT: 64 IN | SYSTOLIC BLOOD PRESSURE: 120 MMHG

## 2017-04-20 NOTE — MR AVS SNAPSHOT
Visit Information Date & Time Provider Department Dept. Phone Encounter #  
 4/20/2017  9:20 AM Marianela Palomo MD [facility] [phone] [id number] Your Appointments 7/12/2017  8:30 AM  
PHYSICAL PRE OP with Rk Sandoval MD  
[facility] [address]) Appt Note: CPE W/PAP last cpe 7/11/16  
 [facility] [phone] [id number]  
[address] [facility]  [address] [facility]  
  
    
 11/15/2017 10:30 AM  
Follow Up with Makenzie Gallegos NP [facility]rology [facility] at [facility] [address]) Appt Note: f/u migraines, $0cp, jrb 11/16/16  
 [address] [place] [address], 
[address] [place] [address], [address] [phone]  
[address] [facility], [address] [place] [address], [address] [facility] [address] [phone] Upcoming Health Maintenance Date Due  
 PAP AKA CERVICAL CYTOLOGY 7/11/2019 DTaP/Tdap/Td series (2 - Td) 10/24/2022 Allergies as of 4/20/2017  Review Complete On: 4/20/2017 By: Camilla Lockwood LPN Severity Noted Reaction Type Reactions Monistat 1 [Tioconazole]  10/24/2012    Swelling Prozac [Fluoxetine]  10/24/2016   Side Effect Other (comments) \"felt like having a heart attack\" Sulfa Dyne  10/24/2012    Nausea and Vomiting Current Immunizations  Never Reviewed Name Date Influenza Vaccine 10/28/2016 TDAP Vaccine 10/24/2012  6:47 PM  
  
 Not reviewed this visit Vitals BP Pulse Height(growth percentile) Weight(growth percentile) SpO2 BMI  
 120/70 80 5' 4\" (1.626 m) 177 lb (80.3 kg) 97% 30.38 kg/m2 OB Status Smoking Status Hysterectomy Former Smoker Vitals History BMI and BSA Data Body Mass Index Body Surface Area  
 30.38 kg/m 2 1.9 m 2 Preferred Pharmacy Pharmacy Name Phone [id number] [address], [address] [phone] Your Updated Medication List  
  
   
This list is accurate as of: 4/20/17  9:52 AM.  Always use your most recent med list.  
  
  
  
  
 baclofen 10 mg tablet Commonly known as:  LIORESAL  
take 1 tablet by mouth three times a day  
  
 citalopram 20 mg tablet Commonly known as:  Fleeta Halim Take 1 Tab by mouth daily. FISH OIL 1,000 mg Cap Generic drug:  omega-3 fatty acids-vitamin e Take 1 Cap by mouth.  
  
 levothyroxine 75 mcg tablet Commonly known as:  SYNTHROID  
TAKE 1 TABLET BY MOUTH ONCE DAILY BEFORE BREAKFAST. LINZESS 145 mcg Cap capsule Generic drug:  linaclotide TAKE 1 CAPSULE BY MOUTH ONCE DAILY BEFORE BREAKFAST. LYRICA 100 mg capsule Generic drug:  pregabalin  
take 1 capsule by mouth twice a day  
  
 methylPREDNISolone 4 mg tablet Commonly known as:  Miriam Jen Take 1 Tab by mouth Specific Days and Specific Times. montelukast 10 mg tablet Commonly known as:  SINGULAIR Take 1 Tab by mouth daily. niCARdipine 30 mg capsule Commonly known as:  CARDENE  
take 1 capsule by mouth three times a day  
  
 onabotulinumtoxinA 200 unit injection Commonly known as:  BOTOX Inject 200 units IM over 31 sites at head and neck every 12 weeks. Migraine Prevention. Inject IM neck/face , 31 FDA approved sites  Indications: MIGRAINE PREVENTION  
  
 SUMAtriptan 100 mg tablet Commonly known as:  IMITREX  
TAKE 1/2 TO 1 TABLET BY MOUTH AT ONSET OF HEADACHE. REPEAT AT 2 HOURS IF NEEDED  
  
 TROKENDI XR 50 mg capsule Generic drug:  topiramate ER  
TAKE 1 CAPSULE BY MOUTH ONCE DAILY. Patient Instructions PRESCRIPTION REFILL POLICY [facility] Statement to Patients April 1, 2014 In an effort to ensure the large volume of patient prescription refills is processed in the most efficient and expeditious manner, we are asking our patients to assist us by calling your Pharmacy for all prescription refills, this will include also your  Mail Order Pharmacy. The pharmacy will contact our office electronically to continue the refill process. Please do not wait until the last minute to call your pharmacy. We need at least 48 hours (2days) to fill prescriptions. We also encourage you to call your pharmacy before going to  your prescription to make sure it is ready. With regard to controlled substance prescription refill requests (narcotic refills) that need to be picked up at our office, we ask your cooperation by providing us with at least 72 hours (3days) notice that you will need a refill. We will not refill narcotic prescription refill requests after 4:00pm on any weekday, Monday through Thursday, or after 2:00pm on Fridays, or on the weekends. We encourage everyone to explore another way of getting your prescription refill request processed using [employer], our patient web portal through our electronic medical record system. Cre[employer] is an efficient and effective way to communicate your medication request directly to the office and  downloadable as an yee on your smart phone . [employer] also features a review functionality that allows you to view your medication list as well as leave messages for your physician. Are you ready to get connected? If so please review the attatched instructions or speak to any of our staff to get you set up right away! Thank you so much for your cooperation. Should you have any questions please contact our Practice Administrator. The Physicians and Staff,  [facility] OnabotulinumtoxinA (By injection) OnabotulinumtoxinA (ex-p-jqa-qu-IBQ-jdw-tox-in-ay) Treats muscle stiffness, muscle spasms, excessive sweating, overactive bladder, or loss of bladder control. Prevents chronic migraine headaches. Improves the appearance of wrinkles on the face. Brand Name(s):Botox, Botox Cosmetic There may be other brand names for this medicine. When This Medicine Should Not Be Used: This medicine is not right for everyone. You should not receive this medicine if you had an allergic reaction to onabotulinumtoxinA or any other botulinum toxin product. How to Use This Medicine:  
Injectable · Your doctor will prescribe your exact dose and tell you how often it should be given. This medicine is given by a healthcare provider as a shot under your skin or into a muscle. · You may be given medicine to numb the area where the shot will be injected. If you receive the medicine around your eyes, you may be given eye drops or ointment to numb the area. After your injection, you may need to wear a protective contact lens or eye patch. · If you are being treated for excessive sweating, shave your underarms but do not use deodorant for 24 hours before your injection. Avoid exercise, hot foods or liquids, or anything else that could make you sweat for 30 minutes before your injection. · The recommended treatment schedule for chronic migraine is every 12 weeks. · This medicine works slowly. Once your condition has improved, the medicine will last about 3 months, then the effects will slowly go away. You might need more injections to treat your condition. ¨ Muscle spasms in the eyelids should improve within 3 to 10 days. ¨ Eye muscle problems should improve 1 or 2 days after the injection, and the improvement should last for 2 to 6 weeks. ¨ Neck pain should improve within 2 to 6 weeks. ¨ Arm stiffness should improve within 4 to 6 weeks. ¨ Facial lines or wrinkles should improve 1 or 2 days. · This medicine should come with a Medication Guide. Ask your pharmacist for a copy if you do not have one. · Missed dose:Call your doctor or pharmacist for instructions. Drugs and Foods to Avoid: Ask your doctor or pharmacist before using any other medicine, including over-the-counter medicines, vitamins, and herbal products. · Some foods and medicine can affect how onabotulinumtoxinA works. Tell your doctor if you are using any of the following: ¨ Aspirin or a blood thinner (such as ticlopidine, warfarin) ¨ Muscle relaxer ¨ Medicine for an infection (such as amikacin, gentamicin, streptomycin, tobramycin) · Tell your doctor if you have received an injection of any botulinum toxin product within the past 4 months. Warnings While Using This Medicine: · Tell your doctor if you are pregnant or breastfeeding, or if you have breathing or lung problems, bleeding problems, heart or blood vessel disease, or nerve or muscle problems (such as myasthenia gravis). Tell your doctor if you have ever had face surgery or if you have a urinary tract infection or trouble urinating, diabetes, or multiple sclerosis. · This medicine may cause the following problems: ¨ Muscle weakness, loss of bladder control, trouble swallowing, speaking, or breathing (caused by the toxin spreading to other parts of your body) · This medicine may make your muscles weak or cause vision problems. Do not drive or do anything else that could be dangerous until you know how this medicine affects you. · There are some warnings that only apply if you are receiving this medicine to treat the following: ¨ Injections near the eye: This medicine may reduce blinking, which can raise the risk of eye problems such as corneal exposure and ulcers. Tell your doctor right away if you notice that you are blinking less than usual or your eyes feel dry. ¨ Urinary incontinence: This medicine may cause autonomic dysreflexia, which can be a life-threatening condition. ¨ Overactive bladder: Check with your doctor right away if you have trouble urinating or a burning sensation while urinating. · This medicine contains products from donated human blood, so it may contain viruses, although the risk is low.  Human donors and blood are always tested for viruses to keep the risk low. Talk with your doctor about this risk if you are concerned. · Your doctor will check your progress and the effects of this medicine at regular visits. Keep all appointments. Possible Side Effects While Using This Medicine:  
Call your doctor right away if you notice any of these side effects: · Allergic reaction: Itching or hives, swelling in your face or hands, swelling or tingling in your mouth or throat, chest tightness, trouble breathing · Blurred or double vision, droopy eyelids · Change in how much or how often you urinate, trouble urinating, or painful urination · Chest pain, slow or uneven heartbeat · Headache, increased sweating, warmth or redness in your face, neck, or arm · Muscle weakness · Trouble swallowing, talking, or breathing If you notice these less serious side effects, talk with your doctor: · Fever, chills, cough, stuffy or runny nose, sore throat, and body aches · Pain in your neck, back, arms, or legs · Redness, pain, tenderness, bruising, swelling, or weakness where the shot was given If you notice other side effects that you think are caused by this medicine, tell your doctor. Call your doctor for medical advice about side effects. You may report side effects to FDA at 8-997-STE-5784 © 2016 [address] is for End User's use only and may not be sold, redistributed or otherwise used for commercial purposes. The above information is an  only. It is not intended as medical advice for individual conditions or treatments. Talk to your doctor, nurse or pharmacist before following any medical regimen to see if it is safe and effective for you. Introducing [facility]! Dear Nehal Miller: Thank you for requesting a [employer] account. Our records indicate that you already have an active [employer] account. You can access your account anytime at [email or web address] Did you know that you can access your hospital and ER discharge instructions at any time in [employer]? You can also review all of your test results from your hospital stay or ER visit. Additional Information If you have questions, please visit the Frequently Asked Questions section of the [employer] website at [email or web address] Remember, [employer] is NOT to be used for urgent needs. For medical emergencies, dial 911. Now available from your iPhone and Android! Please provide this summary of care documentation to your next provider. Your primary care clinician is listed as Sharmin Erickson. If you have any questions after today's visit, please call [phone].

## 2017-04-20 NOTE — PATIENT INSTRUCTIONS
[facility]   Statement to Patients  April 1, 2014      In an effort to ensure the large volume of patient prescription refills is processed in the most efficient and expeditious manner, we are asking our patients to assist us by calling your Pharmacy for all prescription refills, this will include also your  Mail Order Pharmacy. The pharmacy will contact our office electronically to continue the refill process. Please do not wait until the last minute to call your pharmacy. We need at least 48 hours (2days) to fill prescriptions. We also encourage you to call your pharmacy before going to  your prescription to make sure it is ready. With regard to controlled substance prescription refill requests (narcotic refills) that need to be picked up at our office, we ask your cooperation by providing us with at least 72 hours (3days) notice that you will need a refill. We will not refill narcotic prescription refill requests after 4:00pm on any weekday, Monday through Thursday, or after 2:00pm on Fridays, or on the weekends. We encourage everyone to explore another way of getting your prescription refill request processed using [employer], our patient web portal through our electronic medical record system. [employer] is an efficient and effective way to communicate your medication request directly to the office and  downloadable as an yee on your smart phone . [employer] also features a review functionality that allows you to view your medication list as well as leave messages for your physician. Are you ready to get connected? If so please review the attatched instructions or speak to any of our staff to get you set up right away! Thank you so much for your cooperation. Should you have any questions please contact our Practice Administrator.     The Physicians and Staff,  [facility]   OnabotulinumtoxinA (By injection)   OnabotulinumtoxinA (qn-a-tfz-qh-OIH-nkc-tox-in-ay)  Treats muscle stiffness, muscle spasms, excessive sweating, overactive bladder, or loss of bladder control. Prevents chronic migraine headaches. Improves the appearance of wrinkles on the face. Brand Name(s):Botox, Botox Cosmetic   There may be other brand names for this medicine. When This Medicine Should Not Be Used: This medicine is not right for everyone. You should not receive this medicine if you had an allergic reaction to onabotulinumtoxinA or any other botulinum toxin product. How to Use This Medicine:   Injectable  · Your doctor will prescribe your exact dose and tell you how often it should be given. This medicine is given by a healthcare provider as a shot under your skin or into a muscle. · You may be given medicine to numb the area where the shot will be injected. If you receive the medicine around your eyes, you may be given eye drops or ointment to numb the area. After your injection, you may need to wear a protective contact lens or eye patch. · If you are being treated for excessive sweating, shave your underarms but do not use deodorant for 24 hours before your injection. Avoid exercise, hot foods or liquids, or anything else that could make you sweat for 30 minutes before your injection. · The recommended treatment schedule for chronic migraine is every 12 weeks. · This medicine works slowly. Once your condition has improved, the medicine will last about 3 months, then the effects will slowly go away. You might need more injections to treat your condition. ¨ Muscle spasms in the eyelids should improve within 3 to 10 days. ¨ Eye muscle problems should improve 1 or 2 days after the injection, and the improvement should last for 2 to 6 weeks. ¨ Neck pain should improve within 2 to 6 weeks. ¨ Arm stiffness should improve within 4 to 6 weeks. ¨ Facial lines or wrinkles should improve 1 or 2 days. · This medicine should come with a Medication Guide.  Ask your pharmacist for a copy if you do not have one. · Missed dose:Call your doctor or pharmacist for instructions. Drugs and Foods to Avoid:   Ask your doctor or pharmacist before using any other medicine, including over-the-counter medicines, vitamins, and herbal products. · Some foods and medicine can affect how onabotulinumtoxinA works. Tell your doctor if you are using any of the following:  ¨ Aspirin or a blood thinner (such as ticlopidine, warfarin)  ¨ Muscle relaxer  ¨ Medicine for an infection (such as amikacin, gentamicin, streptomycin, tobramycin)  · Tell your doctor if you have received an injection of any botulinum toxin product within the past 4 months. Warnings While Using This Medicine:   · Tell your doctor if you are pregnant or breastfeeding, or if you have breathing or lung problems, bleeding problems, heart or blood vessel disease, or nerve or muscle problems (such as myasthenia gravis). Tell your doctor if you have ever had face surgery or if you have a urinary tract infection or trouble urinating, diabetes, or multiple sclerosis. · This medicine may cause the following problems:  ¨ Muscle weakness, loss of bladder control, trouble swallowing, speaking, or breathing (caused by the toxin spreading to other parts of your body)  · This medicine may make your muscles weak or cause vision problems. Do not drive or do anything else that could be dangerous until you know how this medicine affects you. · There are some warnings that only apply if you are receiving this medicine to treat the following:   ¨ Injections near the eye: This medicine may reduce blinking, which can raise the risk of eye problems such as corneal exposure and ulcers. Tell your doctor right away if you notice that you are blinking less than usual or your eyes feel dry. ¨ Urinary incontinence: This medicine may cause autonomic dysreflexia, which can be a life-threatening condition.   ¨ Overactive bladder: Check with your doctor right away if you have trouble urinating or a burning sensation while urinating. · This medicine contains products from donated human blood, so it may contain viruses, although the risk is low. Human donors and blood are always tested for viruses to keep the risk low. Talk with your doctor about this risk if you are concerned. · Your doctor will check your progress and the effects of this medicine at regular visits. Keep all appointments. Possible Side Effects While Using This Medicine:   Call your doctor right away if you notice any of these side effects:  · Allergic reaction: Itching or hives, swelling in your face or hands, swelling or tingling in your mouth or throat, chest tightness, trouble breathing  · Blurred or double vision, droopy eyelids  · Change in how much or how often you urinate, trouble urinating, or painful urination  · Chest pain, slow or uneven heartbeat  · Headache, increased sweating, warmth or redness in your face, neck, or arm  · Muscle weakness  · Trouble swallowing, talking, or breathing  If you notice these less serious side effects, talk with your doctor:   · Fever, chills, cough, stuffy or runny nose, sore throat, and body aches  · Pain in your neck, back, arms, or legs  · Redness, pain, tenderness, bruising, swelling, or weakness where the shot was given  If you notice other side effects that you think are caused by this medicine, tell your doctor. Call your doctor for medical advice about side effects. You may report side effects to FDA at 2-881-FDA-1615  © 2016 [address] is for End User's use only and may not be sold, redistributed or otherwise used for commercial purposes. The above information is an  only. It is not intended as medical advice for individual conditions or treatments. Talk to your doctor, nurse or pharmacist before following any medical regimen to see if it is safe and effective for you.

## 2017-05-08 RX ORDER — TOPIRAMATE 50 MG/1
CAPSULE, EXTENDED RELEASE ORAL
Qty: 30 CAP | Refills: 5 | Status: SHIPPED | OUTPATIENT
Start: 2017-05-08 | End: 2017-11-14 | Stop reason: SDUPTHER

## 2017-05-15 ENCOUNTER — HOSPITAL ENCOUNTER (OUTPATIENT)
Dept: MAMMOGRAPHY | Age: 52
Discharge: HOME | End: 2017-05-15
Attending: FAMILY MEDICINE
Payer: COMMERCIAL

## 2017-05-15 DIAGNOSIS — Z12.31 VISIT FOR SCREENING MAMMOGRAM: ICD-10-CM

## 2017-05-15 PROCEDURE — 77067 SCR MAMMO BI INCL CAD: CPT

## 2017-05-25 RX ORDER — LEVOTHYROXINE SODIUM 75 UG/1
TABLET ORAL
Qty: 30 TAB | Refills: 3 | Status: SHIPPED | OUTPATIENT
Start: 2017-05-25 | End: 2017-09-14 | Stop reason: SDUPTHER

## 2017-06-22 RX ORDER — CITALOPRAM 20 MG/1
TABLET, FILM COATED ORAL
Qty: 30 TAB | Refills: 3 | Status: SHIPPED | OUTPATIENT
Start: 2017-06-22 | End: 2017-11-15

## 2017-07-06 ENCOUNTER — TELEPHONE (OUTPATIENT)
Dept: NEUROLOGY | Age: 52
End: 2017-07-06

## 2017-07-06 NOTE — TELEPHONE ENCOUNTER
[facility]  [facility] has been trying to get intouch with patient   The are schedule to call back for patient tomorrow  Awaiting for approval from patient to deliver

## 2017-07-09 RX ORDER — PREGABALIN 100 MG/1
CAPSULE ORAL
Qty: 60 CAP | Refills: 3 | Status: SHIPPED | OUTPATIENT
Start: 2017-07-09 | End: 2017-11-15 | Stop reason: SDUPTHER

## 2017-07-10 ENCOUNTER — TELEPHONE (OUTPATIENT)
Dept: NEUROLOGY | Age: 52
End: 2017-07-10

## 2017-07-12 ENCOUNTER — DOCUMENTATION ONLY (OUTPATIENT)
Dept: NEUROLOGY | Age: 52
End: 2017-07-12

## 2017-07-12 ENCOUNTER — OFFICE VISIT (OUTPATIENT)
Dept: INTERNAL MEDICINE CLINIC | Age: 52
End: 2017-07-12

## 2017-07-12 VITALS
HEIGHT: 64 IN | HEART RATE: 60 BPM | TEMPERATURE: 97.8 F | WEIGHT: 185 LBS | BODY MASS INDEX: 31.58 KG/M2 | OXYGEN SATURATION: 99 % | RESPIRATION RATE: 16 BRPM | SYSTOLIC BLOOD PRESSURE: 105 MMHG | DIASTOLIC BLOOD PRESSURE: 69 MMHG

## 2017-07-12 DIAGNOSIS — Z00.00 ROUTINE PHYSICAL EXAMINATION: Primary | ICD-10-CM

## 2017-07-12 DIAGNOSIS — G47.10 HYPERSOMNOLENCE: ICD-10-CM

## 2017-07-12 DIAGNOSIS — E55.9 VITAMIN D DEFICIENCY: ICD-10-CM

## 2017-07-12 DIAGNOSIS — G43.109 MIGRAINE WITH AURA AND WITHOUT STATUS MIGRAINOSUS, NOT INTRACTABLE: ICD-10-CM

## 2017-07-12 DIAGNOSIS — M79.7 FIBROMYALGIA: ICD-10-CM

## 2017-07-12 DIAGNOSIS — Z12.11 SCREENING FOR COLON CANCER: ICD-10-CM

## 2017-07-12 DIAGNOSIS — F41.9 ANXIETY AND DEPRESSION: ICD-10-CM

## 2017-07-12 DIAGNOSIS — E03.9 ACQUIRED HYPOTHYROIDISM: ICD-10-CM

## 2017-07-12 DIAGNOSIS — F32.A ANXIETY AND DEPRESSION: ICD-10-CM

## 2017-07-12 RX ORDER — MODAFINIL 100 MG/1
100 TABLET ORAL DAILY
Qty: 30 TAB | Refills: 3 | Status: SHIPPED | OUTPATIENT
Start: 2017-07-12 | End: 2018-03-19

## 2017-07-12 NOTE — PROGRESS NOTES
Noel Aguilera is a 54 y.o. female who is here for an annual physical with pap. Seen in March for left sciatica. Prior CHRIS/BSO 2011. Some vaginal dryness. Prior cystocele repair. Occasional urinary incontinence, stress incontinence. Normal bowel movements. problems with constipation. Tried linzess. Is off now, not tolerated. Taking fibercon and using miralax. Colon age 50. Mammogram annually, negative May 2017.      On lyrica for FM, pain is controlled. Reports lower back pain. Is stretching more now and it is better. Occasionally will shoot down left leg. Uses heat and uses advil 600mg wth some relief. She reports fractured left patella in October 2016 with a fall, fractured right ankle 2011. Taking Celexa for mood. Overall mood is controlled. Past few weeks, some panic attacks with palpitations and chest pressure on awakening. No obvious trigger. She was able to talk herself down. Will get some chest tightness if thinks about panic spells. Treated for hypothyroidism. Due for labs. Getting botox for migraines. Taking Trokendi (topamax). Migraines are better. Prior EDDIE diagnosis, not using CPAP. Has hypersomnolence, took provigil. Is always tired. Taking nicardipine for HTN. Walking at times, no exertional chest pain. Occasional dizziness. Reports chronic rhinitis. Uses flonase and singulair. Prior immunotherapy.        Allergies   Allergen Reactions    Monistat 1 [Tioconazole] Swelling    Prozac [Fluoxetine] Other (comments)     \"felt like having a heart attack\"    Sulfa Dyne Nausea and Vomiting        Social History     Social History    Marital status:      Spouse name: N/A    Number of children: N/A    Years of education: N/A     Social History Main Topics    Smoking status: Former Smoker    Smokeless tobacco: Never Used    Alcohol use Yes    Drug use: No    Sexual activity: Not Asked     Other Topics Concern    None     Social History Narrative Past Medical History:   Diagnosis Date    Anxiety     Constipation     Essential hypertension     Fibromyalgia     Headache     Headache     Hypothyroidism     Joint pain     Obstructive sleep apnea (adult) (pediatric) 2014    Sinus problem     Sleep trouble     Stress     Tiredness         Past Surgical History:   Procedure Laterality Date    HX BLADDER SUSPENSION      HX GYN  1988        HX GYN  2009    ablation    HX HYSTERECTOMY  2011    CHRIS/BSO bleeding.  HX ORTHOPAEDIC      HX TUBAL LIGATION         Family History   Problem Relation Age of Onset    Diabetes Father     Hypertension Father     Cancer Mother      skin    Cancer Maternal Aunt     Cancer Maternal Uncle     Diabetes Maternal Uncle     Diabetes Paternal Aunt     Diabetes Maternal Grandmother     Parkinsonism Maternal Grandmother     Other Paternal Grandmother      amylodosis        Current Outpatient Prescriptions   Medication Sig Dispense Refill    LYRICA 100 mg capsule take 1 capsule by mouth twice a day 60 Cap 3    citalopram (CELEXA) 20 mg tablet take 1 tablet by mouth once daily 30 Tab 3    levothyroxine (SYNTHROID) 75 mcg tablet take 1 tablet by mouth once daily BEFORE BREAKFAST 30 Tab 3    TROKENDI XR 50 mg capsule take 1 tablet by mouth once daily 30 Cap 5    baclofen (LIORESAL) 10 mg tablet take 1 tablet by mouth three times a day 90 Tab 6    SUMAtriptan (IMITREX) 100 mg tablet TAKE 1/2 TO 1 TABLET BY MOUTH AT ONSET OF HEADACHE. REPEAT AT 2 HOURS IF NEEDED 9 Tab 3    montelukast (SINGULAIR) 10 mg tablet Take 1 Tab by mouth daily. 30 Tab 5    onabotulinumtoxinA (BOTOX) 200 unit injection Inject 200 units IM over 31 sites at head and neck every 12 weeks. Migraine Prevention.   Inject IM neck/face , 31 FDA approved sites  Indications: MIGRAINE PREVENTION 1 Vial 3    niCARdipine (CARDENE) 30 mg capsule take 1 capsule by mouth three times a day 270 Cap 2    omega-3 fatty acids-vitamin e (FISH OIL) 1,000 mg cap Take 1 Cap by mouth.  methylPREDNISolone (MEDROL DOSEPACK) 4 mg tablet Take 1 Tab by mouth Specific Days and Specific Times. 1 Dose Pack 0    LINZESS 145 mcg cap capsule TAKE 1 CAPSULE BY MOUTH ONCE DAILY BEFORE BREAKFAST. 30 Cap 3          ROS:  General: negative for fevers, chills, anorexia, weight loss,positive for fatigue  Eyes:   negative for visual disturbance, irritation  ENT:   negative for tinnitus,sore throat,nasal congestion,ear pains. hoarseness  Resp:   negative for cough, hemoptysis, dyspnea,wheezing  CV:   negative for chest pain, palpitations, lower extremity edema  GI:   negative for nausea, vomiting, diarrhea, abdominal pain,melena  Endo:              negative for polyuria,polydipsia,polyphagia,heat intolerance  :  negative for frequency, dysuria, hematuria, no vaginal discharge  Skin:   negative for rash, pruritus  Heme:  negative for easy bruising, gum/nose bleeding  Musc:  negative for arthralgias, back pain, muscle weakness, joint pain,positive for myalgias  Neuro:  negative for numbness, focal weakness, positive for headaches  Psych:  negative for depression, mood changes,positive for anxiety      Blood pressure 105/69, pulse 60, temperature 97.8 °F (36.6 °C), temperature source Oral, resp. rate 16, height 5' 4\" (1.626 m), weight 185 lb (83.9 kg), SpO2 99 %. Body mass index is 31.76 kg/(m^2). General: Well, no acute distress  HEENT:   PERRL,normal conjunctiva. External ear and canals normal, TMs normal.  Hearing normal to voice. Nose without edema or discharge, with normal septum. Lips, teeth, gums normal.  Oropharynx: no erythema, no exudates, no lesions, normal tongue. Neck:  Supple. Thyroid normal size, nontender, without nodules. No carotid bruit. No masses or LAD  Resp:  No wheezing, no rhonchi, no rales. No chest wall tenderness. CV:  RRR, normal B6W4, 2/6 systolic murmur. GI: soft, nontender, without masses.   No hepatosplenomegaly. Extrem:  +2 pulses, no edema, warm distally  Neuro: alert, nonfocal  Psych: Riki Cordero Affect is alert and attentive. Breasts: no axillary LAD, normal nipples without discharge, no masses    Assessment and Plan:      1. Routine physical examination- Recommend heart healthy diet and regular cardiovascular exercise. - METABOLIC PANEL, COMPREHENSIVE  - CBC W/O DIFF  - LIPID PANEL  - VITAMIN D, 25 HYDROXY  - URINALYSIS W/ RFLX MICROSCOPIC  - TSH 3RD GENERATION    2. Screening for colon cancer    - REFERRAL TO GASTROENTEROLOGY    3. Migraine with aura and without status migrainosus, not intractable- follow migraine triggers and use medications as directed. 4. Acquired hypothyroidism- Recommend taking thyroid medication daily on empty stomach and regular follow up.    - TSH 3RD GENERATION    5. Anxiety and depression- continue Cymbalta      6. Fibromyalgia-continue Cymbalta and Lyrica. 7. Hypersomnolence    - modafinil (PROVIGIL) 100 mg tablet; Take 1 Tab by mouth daily. Max Daily Amount: 100 mg. Dispense: 30 Tab; Refill: 3    8.  Vitamin D deficiency    - VITAMIN D, 25 HYDROXY    Follow-up Disposition: Not on File     Royal Miya MD

## 2017-07-12 NOTE — PROGRESS NOTES
Chief Complaint   Patient presents with   [facility] Annual Wellness Visit     Pt fasting    Complete Physical    Headache     x 2 weeks       Reviewed record In preparation for visit and have obtained necessary documentation    1. Have you been to the ER, urgent care clinic since your last visit? Hospitalized since your last visit? NO    2. Have you seen or consulted any other health care providers outside of the [address] [place] [address] since your last visit? Include any pap smears or colon screening. NO    Patient does not have advance directive on file and has received paperwork.

## 2017-07-12 NOTE — MR AVS SNAPSHOT
Visit Information Date & Time Provider Department Dept. Phone Encounter #  
 7/12/2017  8:30 AM Sander Castañeda, [address] [facility] [phone] [id number] Follow-up Instructions Return for follow up pending labs and annual.  .  
  
Your Appointments 7/20/2017  9:40 AM  
PROCEDURE with Nuno Hernandez MD  
[facility]) Appt Note: procedure Botox $ 0 CP jll 4/20/17 [place] [address] [place] [id number] [phone]  
  
   
 [place] Maliaiaside  
  
    
 11/15/2017 10:30 AM  
Follow Up with Ilir Weston NP [facility]) Appt Note: f/u migraines, $0cp, jrb 11/16/16  
 [facility], 
[address] [place] [address], [address] [phone]  
[address] [facility], [address] [place] [address], [address] [place] [address] [address] [phone] Upcoming Health Maintenance Date Due INFLUENZA AGE 9 TO ADULT 8/1/2017 PAP AKA CERVICAL CYTOLOGY 7/11/2019 DTaP/Tdap/Td series (2 - Td) 10/24/2022 Allergies as of 7/12/2017  Review Complete On: 7/12/2017 By: Sander Castañeda MD  
  
 Severity Noted Reaction Type Reactions Monistat 1 [Tioconazole]  10/24/2012    Swelling Prozac [Fluoxetine]  10/24/2016   Side Effect Other (comments) \"felt like having a heart attack\" Sulfa Dyne  10/24/2012    Nausea and Vomiting Current Immunizations  Never Reviewed Name Date Influenza Vaccine 10/28/2016 TDAP Vaccine 10/24/2012  6:47 PM  
  
 Not reviewed this visit You Were Diagnosed With   
  
 Codes Comments Routine physical examination    -  Primary ICD-10-CM: Z00.00 ICD-9-CM: V70.0 Screening for colon cancer     ICD-10-CM: Z12.11 ICD-9-CM: V76.51 Migraine with aura and without status migrainosus, not intractable     ICD-10-CM: G43.109 ICD-9-CM: 346.00   
 Acquired hypothyroidism     ICD-10-CM: E03.9 ICD-9-CM: 244.9 Anxiety and depression     ICD-10-CM: F41.9, F32.9 ICD-9-CM: 300.00, 311 Fibromyalgia     ICD-10-CM: M79.7 ICD-9-CM: 729.1 Hypersomnolence     ICD-10-CM: G47.10 ICD-9-CM: 780.54 Vitamin D deficiency     ICD-10-CM: E55.9 ICD-9-CM: 268.9 Vitals BP Pulse Temp Resp Height(growth percentile) Weight(growth percentile) 105/69 (BP 1 Location: Left arm, BP Patient Position: Sitting) 60 97.8 °F (36.6 °C) (Oral) 16 5' 4\" (1.626 m) 185 lb (83.9 kg) SpO2 BMI OB Status Smoking Status 99% 31.76 kg/m2 Hysterectomy Former Smoker Vitals History BMI and BSA Data Body Mass Index Body Surface Area 31.76 kg/m 2 1.95 m 2 Preferred Pharmacy Pharmacy Name Phone [phone], [address] [phone] Your Updated Medication List  
  
   
This list is accurate as of: 7/12/17  9:20 AM.  Always use your most recent med list.  
  
  
  
  
 baclofen 10 mg tablet Commonly known as:  LIORESAL  
take 1 tablet by mouth three times a day  
  
 citalopram 20 mg tablet Commonly known as:  CELEXA  
take 1 tablet by mouth once daily FISH OIL 1,000 mg Cap Generic drug:  omega-3 fatty acids-vitamin e Take 1 Cap by mouth.  
  
 levothyroxine 75 mcg tablet Commonly known as:  SYNTHROID  
take 1 tablet by mouth once daily BEFORE BREAKFAST  
  
 LYRICA 100 mg capsule Generic drug:  pregabalin  
take 1 capsule by mouth twice a day  
  
 modafinil 100 mg tablet Commonly known as:  PROVIGIL Take 1 Tab by mouth daily. Max Daily Amount: 100 mg.  
  
 montelukast 10 mg tablet Commonly known as:  SINGULAIR Take 1 Tab by mouth daily. niCARdipine 30 mg capsule Commonly known as:  CARDENE  
take 1 capsule by mouth three times a day  
  
 onabotulinumtoxinA 200 unit injection Commonly known as:  BOTOX Inject 200 units IM over 31 sites at head and neck every 12 weeks. Migraine Prevention. Inject IM neck/face , 31 FDA approved sites  Indications: MIGRAINE PREVENTION  
  
 SUMAtriptan 100 mg tablet Commonly known as:  IMITREX  
TAKE 1/2 TO 1 TABLET BY MOUTH AT ONSET OF HEADACHE. REPEAT AT 2 HOURS IF NEEDED  
  
 TROKENDI XR 50 mg capsule Generic drug:  topiramate ER  
take 1 tablet by mouth once daily Prescriptions Printed Refills  
 modafinil (PROVIGIL) 100 mg tablet 3 Sig: Take 1 Tab by mouth daily. Max Daily Amount: 100 mg. Class: Print Route: Oral  
  
We Performed the Following CBC W/O DIFF [30981 CPT(R)] LIPID PANEL [57157 CPT(R)] METABOLIC PANEL, COMPREHENSIVE [19903 CPT(R)] REFERRAL TO GASTROENTEROLOGY [VXJ55 Custom] Comments:  
 Colon screening. TSH 3RD GENERATION [35846 CPT(R)] URINALYSIS W/ RFLX MICROSCOPIC [22762 CPT(R)] VITAMIN D, 25 HYDROXY [id number] CPT(R)] Follow-up Instructions Return for follow up pending labs and annual.  . Referral Information Referral ID Referred By Referred To  
  
 [id number] [facility] [place] [address], [address] [facility] Visits Status Start Date End Date 1 New Request 7/12/17 7/12/18 If your referral has a status of pending review or denied, additional information will be sent to support the outcome of this decision. Patient Instructions RECOMMEND [id number] CALORIE DIET. TRACK CALORIE INTAKE WITH MY FITNESS PAL VELMA. PROTEIN AT EVERY MEAL. REDUCE INTAKE OF STARCHY CARBS, LIKE BREAD, RICE, PASTA, AND POTATOES. MAKE CARBS 1/4 OF YOUR PLATE. DRINK CALORIE FREE BEVERAGES ONLY. TRY GRAZING DIET, 3 SMALL MEALS AND 2 SNACKS. INCREASE CARDIOVASCULAR EXERCISE, MINIMUM 3 DAYS A WEEK, 30 MINUTES OF CARDIO. Introducing [facility]! Dear Cari Lucero: Thank you for requesting a [employer] account. Our records indicate that you already have an active [employer] account. You can access your account anytime at [email or web address] Did you know that you can access your hospital and ER discharge instructions at any time in [employer]? You can also review all of your test results from your hospital stay or ER visit. Additional Information If you have questions, please visit the Frequently Asked Questions section of the [employer] website at [email or web address] Remember, [employer] is NOT to be used for urgent needs. For medical emergencies, dial 911. Now available from your iPhone and Android! Please provide this summary of care documentation to your next provider. Your primary care clinician is listed as Kitty Fajardo. If you have any questions after today's visit, please call [phone].

## 2017-07-12 NOTE — PATIENT INSTRUCTIONS
RECOMMEND [phone] CALORIE DIET. TRACK CALORIE INTAKE WITH MY FITNESS PAL VELMA. PROTEIN AT EVERY MEAL. REDUCE INTAKE OF STARCHY CARBS, LIKE BREAD, RICE, PASTA, AND POTATOES. MAKE CARBS 1/4 OF YOUR PLATE. DRINK CALORIE FREE BEVERAGES ONLY. TRY GRAZING DIET, 3 SMALL MEALS AND 2 SNACKS. INCREASE CARDIOVASCULAR EXERCISE, MINIMUM 3 DAYS A WEEK, 30 MINUTES OF CARDIO.

## 2017-07-12 NOTE — PROGRESS NOTES
Pharmacy called because they did not receive the refill that was approved 7/9/17.  Called in as noted in the chart

## 2017-07-13 LAB
25(OH)D3+25(OH)D2 SERPL-MCNC: 33.6 NG/ML (ref 30–100)
ALBUMIN SERPL-MCNC: 4.1 G/DL (ref 3.5–5.5)
ALBUMIN/GLOB SERPL: 1.6 {RATIO} (ref 1.2–2.2)
ALP SERPL-CCNC: 95 IU/L (ref 39–117)
ALT SERPL-CCNC: 17 IU/L (ref 0–32)
APPEARANCE UR: ABNORMAL
AST SERPL-CCNC: 21 IU/L (ref 0–40)
BACTERIA #/AREA URNS HPF: ABNORMAL /[HPF]
BILIRUB SERPL-MCNC: 0.3 MG/DL (ref 0–1.2)
BILIRUB UR QL STRIP: NEGATIVE
BUN SERPL-MCNC: 29 MG/DL (ref 6–24)
BUN/CREAT SERPL: 26 (ref 9–23)
CALCIUM SERPL-MCNC: 9.4 MG/DL (ref 8.7–10.2)
CASTS URNS QL MICRO: ABNORMAL /LPF
CHLORIDE SERPL-SCNC: 100 MMOL/L (ref 96–106)
CHOLEST SERPL-MCNC: 215 MG/DL (ref 100–199)
CO2 SERPL-SCNC: 25 MMOL/L (ref 18–29)
COLOR UR: YELLOW
CREAT SERPL-MCNC: 1.12 MG/DL (ref 0.57–1)
CRYSTALS URNS MICRO: ABNORMAL
EPI CELLS #/AREA URNS HPF: ABNORMAL /HPF
ERYTHROCYTE [DISTWIDTH] IN BLOOD BY AUTOMATED COUNT: 12.8 % (ref 12.3–15.4)
GLOBULIN SER CALC-MCNC: 2.6 G/DL (ref 1.5–4.5)
GLUCOSE SERPL-MCNC: 90 MG/DL (ref 65–99)
GLUCOSE UR QL: NEGATIVE
HCT VFR BLD AUTO: 39 % (ref 34–46.6)
HDLC SERPL-MCNC: 73 MG/DL
HGB BLD-MCNC: 12.7 G/DL (ref 11.1–15.9)
HGB UR QL STRIP: NEGATIVE
KETONES UR QL STRIP: NEGATIVE
LDLC SERPL CALC-MCNC: 130 MG/DL (ref 0–99)
LEUKOCYTE ESTERASE UR QL STRIP: ABNORMAL
MCH RBC QN AUTO: 30.7 PG (ref 26.6–33)
MCHC RBC AUTO-ENTMCNC: 32.6 G/DL (ref 31.5–35.7)
MCV RBC AUTO: 94 FL (ref 79–97)
MICRO URNS: ABNORMAL
MUCOUS THREADS URNS QL MICRO: PRESENT
NITRITE UR QL STRIP: NEGATIVE
PH UR STRIP: 6 [PH] (ref 5–7.5)
PLATELET # BLD AUTO: 234 X10E3/UL (ref 150–379)
POTASSIUM SERPL-SCNC: 4.3 MMOL/L (ref 3.5–5.2)
PROT SERPL-MCNC: 6.7 G/DL (ref 6–8.5)
PROT UR QL STRIP: NEGATIVE
RBC # BLD AUTO: 4.14 X10E6/UL (ref 3.77–5.28)
RBC #/AREA URNS HPF: ABNORMAL /HPF
SODIUM SERPL-SCNC: 141 MMOL/L (ref 134–144)
SP GR UR: 1.02 (ref 1–1.03)
TRIGL SERPL-MCNC: 61 MG/DL (ref 0–149)
TSH SERPL DL<=0.005 MIU/L-ACNC: 3.05 UIU/ML (ref 0.45–4.5)
UNIDENT CRYS URNS QL MICRO: PRESENT
UROBILINOGEN UR STRIP-MCNC: 0.2 MG/DL (ref 0.2–1)
VLDLC SERPL CALC-MCNC: 12 MG/DL (ref 5–40)
WBC # BLD AUTO: 6.3 X10E3/UL (ref 3.4–10.8)
WBC #/AREA URNS HPF: ABNORMAL /HPF

## 2017-07-13 NOTE — PROGRESS NOTES
Labs show that she has mildly abnormal kidney function and calcium oxalate crystals in urine. She is at risk for kidney stones, increase water intake and avoid soda. Avoid NSAIDS. LDL cholesterol reduced from 164 one year ago to 130, goal is less than 100. Vitamin D is normal.  Normal liver. Normal blood counts. Thyroid at goal.  Annual follow up.

## 2017-07-19 ENCOUNTER — TELEPHONE (OUTPATIENT)
Dept: NEUROLOGY | Age: 52
End: 2017-07-19

## 2017-07-19 NOTE — PROGRESS NOTES
Attempted to call the patient, the mailbox is full. Will send a My Chart message with the lab results.

## 2017-07-20 ENCOUNTER — OFFICE VISIT (OUTPATIENT)
Dept: NEUROLOGY | Age: 52
End: 2017-07-20

## 2017-07-20 VITALS
HEIGHT: 64 IN | BODY MASS INDEX: 31.92 KG/M2 | HEART RATE: 68 BPM | SYSTOLIC BLOOD PRESSURE: 118 MMHG | DIASTOLIC BLOOD PRESSURE: 68 MMHG | RESPIRATION RATE: 20 BRPM | OXYGEN SATURATION: 99 % | WEIGHT: 187 LBS

## 2017-07-20 NOTE — PATIENT INSTRUCTIONS
OnabotulinumtoxinA (By injection)   OnabotulinumtoxinA (kl-h-ewx-ow-ECQ-gmd-tox-in-ay)  Treats muscle stiffness, muscle spasms, excessive sweating, overactive bladder, or loss of bladder control. Prevents chronic migraine headaches. Improves the appearance of wrinkles on the face. Brand Name(s): Botox, Botox Cosmetic   There may be other brand names for this medicine. When This Medicine Should Not Be Used: This medicine is not right for everyone. You should not receive this medicine if you had an allergic reaction to onabotulinumtoxinA or any other botulinum toxin product. How to Use This Medicine:   Injectable  · Your doctor will prescribe your exact dose and tell you how often it should be given. This medicine is given by a healthcare provider as a shot under your skin or into a muscle. · You may be given medicine to numb the area where the shot will be injected. If you receive the medicine around your eyes, you may be given eye drops or ointment to numb the area. After your injection, you may need to wear a protective contact lens or eye patch. · If you are being treated for excessive sweating, shave your underarms but do not use deodorant for 24 hours before your injection. Avoid exercise, hot foods or liquids, or anything else that could make you sweat for 30 minutes before your injection. · The recommended treatment schedule for chronic migraine is every 12 weeks. · This medicine works slowly. Once your condition has improved, the medicine will last about 3 months, then the effects will slowly go away. You might need more injections to treat your condition. ¨ Muscle spasms in the eyelids should improve within 3 to 10 days. ¨ Eye muscle problems should improve 1 or 2 days after the injection, and the improvement should last for 2 to 6 weeks. ¨ Neck pain should improve within 2 to 6 weeks. ¨ Arm stiffness should improve within 4 to 6 weeks.   ¨ Facial lines or wrinkles should improve 1 or 2 days.  · This medicine should come with a Medication Guide. Ask your pharmacist for a copy if you do not have one. · Missed dose:Call your doctor or pharmacist for instructions. Drugs and Foods to Avoid:   Ask your doctor or pharmacist before using any other medicine, including over-the-counter medicines, vitamins, and herbal products. · Some foods and medicine can affect how onabotulinumtoxinA works. Tell your doctor if you are using any of the following:  ¨ Aspirin or a blood thinner (such as ticlopidine, warfarin)  ¨ Muscle relaxer  ¨ Medicine for an infection (such as amikacin, gentamicin, streptomycin, tobramycin)  · Tell your doctor if you have received an injection of any botulinum toxin product within the past 4 months. Warnings While Using This Medicine:   · Tell your doctor if you are pregnant or breastfeeding, or if you have breathing or lung problems, bleeding problems, heart or blood vessel disease, or nerve or muscle problems (such as myasthenia gravis). Tell your doctor if you have ever had face surgery or if you have a urinary tract infection or trouble urinating, diabetes, or multiple sclerosis. · This medicine may cause the following problems:  ¨ Muscle weakness, loss of bladder control, trouble swallowing, speaking, or breathing (caused by the toxin spreading to other parts of your body)  · This medicine may make your muscles weak or cause vision problems. Do not drive or do anything else that could be dangerous until you know how this medicine affects you. · There are some warnings that only apply if you are receiving this medicine to treat the following:   ¨ Injections near the eye: This medicine may reduce blinking, which can raise the risk of eye problems such as corneal exposure and ulcers. Tell your doctor right away if you notice that you are blinking less than usual or your eyes feel dry. ¨ Urinary incontinence:  This medicine may cause autonomic dysreflexia, which can be a life-threatening condition. ¨ Overactive bladder: Check with your doctor right away if you have trouble urinating or a burning sensation while urinating. · This medicine contains products from donated human blood, so it may contain viruses, although the risk is low. Human donors and blood are always tested for viruses to keep the risk low. Talk with your doctor about this risk if you are concerned. · Your doctor will check your progress and the effects of this medicine at regular visits. Keep all appointments. Possible Side Effects While Using This Medicine:   Call your doctor right away if you notice any of these side effects:  · Allergic reaction: Itching or hives, swelling in your face or hands, swelling or tingling in your mouth or throat, chest tightness, trouble breathing  · Blurred or double vision, droopy eyelids  · Change in how much or how often you urinate, trouble urinating, or painful urination  · Chest pain, slow or uneven heartbeat  · Headache, increased sweating, warmth or redness in your face, neck, or arm  · Muscle weakness  · Trouble swallowing, talking, or breathing  If you notice these less serious side effects, talk with your doctor:   · Fever, chills, cough, stuffy or runny nose, sore throat, and body aches  · Pain in your neck, back, arms, or legs  · Redness, pain, tenderness, bruising, swelling, or weakness where the shot was given  If you notice other side effects that you think are caused by this medicine, tell your doctor. Call your doctor for medical advice about side effects. You may report side effects to FDA at 0-945-FDA-7715  © 2017 [facility] Information is for End User's use only and may not be sold, redistributed or otherwise used for commercial purposes. The above information is an  only. It is not intended as medical advice for individual conditions or treatments.  Talk to your doctor, nurse or pharmacist before following any medical regimen to see if it is safe and effective for you.       [facility]   [phone]

## 2017-07-20 NOTE — PROGRESS NOTES
Pain scale prior to procedure  6 /10  Pain scale post procedure     /10  Patient states 30 headaches a month & lasting  24  hrs  Been treated for headaches greater than 6 months  Consent signed     Instructions post injection discussed with patient   1. Do not lay flat for 4 hrs  2. Do not press on injection sites up to 24 hrs.     Patient verbalizes understanding

## 2017-07-20 NOTE — PROGRESS NOTES
Neurology Progress Note    Patient ID:  Betsy [facility]  [id number]  36 y.o.  1965    Subjective:    Ms. Edna Calvillo is here for follow up today of chronic migraine. She is on Botox for this and it is successful. At this point her main concern is with tension headaches. Currently she is taking Trokendi 50 mg daily. She is also having more panic and anxiety attacks. She takes Celexa 20 mg for this. She continues to have a headache over the right forehead that extends backwards. It is a pressure type feeling. It can last for several hours at a time. Recap:   She feels like her botox hasn't been working the last two times. She has been getting migraines 1-2 times per week. She has definitely met the criteria for continued botox since she has 5 less headache days per month. She would like to cont the botox but add a preventative. She tried depakote, topamax, lyrica, SSRIs, nortriptyline, and nicardipine (Ca ch blocker). She is also on baclofen    Objective:   ROS:  Per HPI    Meds:  Current Outpatient Prescriptions on File Prior to Visit   Medication Sig Dispense Refill    LYRICA 100 mg capsule take 1 capsule by mouth twice a day 60 Cap 3    citalopram (CELEXA) 20 mg tablet take 1 tablet by mouth once daily 30 Tab 3    levothyroxine (SYNTHROID) 75 mcg tablet take 1 tablet by mouth once daily BEFORE BREAKFAST 30 Tab 3    TROKENDI XR 50 mg capsule take 1 tablet by mouth once daily 30 Cap 5    baclofen (LIORESAL) 10 mg tablet take 1 tablet by mouth three times a day 90 Tab 6    SUMAtriptan (IMITREX) 100 mg tablet TAKE 1/2 TO 1 TABLET BY MOUTH AT ONSET OF HEADACHE. REPEAT AT 2 HOURS IF NEEDED 9 Tab 3    montelukast (SINGULAIR) 10 mg tablet Take 1 Tab by mouth daily. 30 Tab 5    onabotulinumtoxinA (BOTOX) 200 unit injection Inject 200 units IM over 31 sites at head and neck every 12 weeks. Migraine Prevention.   Inject IM neck/face , 31 FDA approved sites  Indications: MIGRAINE PREVENTION 1 Vial 3    niCARdipine (CARDENE) 30 mg capsule take 1 capsule by mouth three times a day 270 Cap 2    omega-3 fatty acids-vitamin e (FISH OIL) 1,000 mg cap Take 1 Cap by mouth.  modafinil (PROVIGIL) 100 mg tablet Take 1 Tab by mouth daily. Max Daily Amount: 100 mg. 30 Tab 3     No current facility-administered medications on file prior to visit. Imaging:  No new imaging    Lab Review   Results for orders placed or performed in visit on 30/13/76   METABOLIC PANEL, COMPREHENSIVE   Result Value Ref Range    Glucose 90 65 - 99 mg/dL    BUN 29 (H) 6 - 24 mg/dL    Creatinine 1.12 (H) 0.57 - 1.00 mg/dL    GFR est non-AA 58 (L) >59 mL/min/1.73    GFR est AA 67 >59 mL/min/1.73    BUN/Creatinine ratio 26 (H) 9 - 23    Sodium 141 134 - 144 mmol/L    Potassium 4.3 3.5 - 5.2 mmol/L    Chloride 100 96 - 106 mmol/L    CO2 25 18 - 29 mmol/L    Calcium 9.4 8.7 - 10.2 mg/dL    Protein, total 6.7 6.0 - 8.5 g/dL    Albumin 4.1 3.5 - 5.5 g/dL    GLOBULIN, TOTAL 2.6 1.5 - 4.5 g/dL    A-G Ratio 1.6 1.2 - 2.2    Bilirubin, total 0.3 0.0 - 1.2 mg/dL    Alk.  phosphatase 95 39 - 117 IU/L    AST (SGOT) 21 0 - 40 IU/L    ALT (SGPT) 17 0 - 32 IU/L   CBC W/O DIFF   Result Value Ref Range    WBC 6.3 3.4 - 10.8 x10E3/uL    RBC 4.14 3.77 - 5.28 x10E6/uL    HGB 12.7 11.1 - 15.9 g/dL    HCT 39.0 34.0 - 46.6 %    MCV 94 79 - 97 fL    MCH 30.7 26.6 - 33.0 pg    MCHC 32.6 31.5 - 35.7 g/dL    RDW 12.8 12.3 - 15.4 %    PLATELET 575 764 - 571 x10E3/uL   LIPID PANEL   Result Value Ref Range    Cholesterol, total 215 (H) 100 - 199 mg/dL    Triglyceride 61 0 - 149 mg/dL    HDL Cholesterol 73 >39 mg/dL    VLDL, calculated 12 5 - 40 mg/dL    LDL, calculated 130 (H) 0 - 99 mg/dL   VITAMIN D, 25 HYDROXY   Result Value Ref Range    VITAMIN D, 25-HYDROXY 33.6 30.0 - 100.0 ng/mL   URINALYSIS W/ RFLX MICROSCOPIC   Result Value Ref Range    Specific Gravity 1.025 1.005 - 1.030    pH (UA) 6.0 5.0 - 7.5    Color Yellow Yellow    Appearance Cloudy (A) Clear    Leukocyte Esterase 2+ (A) Negative    Protein Negative Negative/Trace    Glucose Negative Negative    Ketone Negative Negative    Blood Negative Negative    Bilirubin Negative Negative    Urobilinogen 0.2 0.2 - 1.0 mg/dL    Nitrites Negative Negative    Microscopic Examination See additional order    TSH 3RD GENERATION   Result Value Ref Range    TSH 3.050 0.450 - 4.500 uIU/mL   MICROSCOPIC EXAMINATION   Result Value Ref Range    WBC 11-30 (A) 0 - 5 /hpf    RBC None seen 0 - 2 /hpf    Epithelial cells 0-10 0 - 10 /hpf    Casts None seen None seen /lpf    Crystals Present (A) N/A    Crystal type Calcium Oxalate N/A    Mucus Present Not Estab. Bacteria Few None seen/Few       Exam:  Visit Vitals    /68    Pulse 68    Resp 20    Ht 5' 4\" (1.626 m)    Wt 84.8 kg (187 lb)    SpO2 99%    BMI 32.1 kg/m2     Gen:  CV: RRR  Lungs: non labored breathing  Abd: non distending  Neuro: A&O x 3, no dysarthria or aphasia  CN II-XII: PERRL, EOMI, face symmetric, tongue/palate midline  Motor: strength 5/5 all four ext  Sensory: intact to LT  Gait: normal    Assessment: This is a 57y/o female who presents with chronic migraines and fibromyalgia. She has been on multiple medications in the past. Will cont botox. Will also adjust her preventative. Plan:   1. Continue Botox  2. Frova for abortive  3. Cont Lyrica 100mg BID  4. Cont baclofen, nabumetone, nicardipine  5. Headache diary  6. Increase Trokendi to 75 mg nightly. Patient will call if this helps we will send a new prescription. Discussed side effects of increasing the medication. Samples given today. FU in 3 months    Signed:  Pernell Prince MD  7/20/2017  3:29 PM    This note was created using voice recognition software. Despite editing, there may be syntax errors. This note will not be viewable in [address]

## 2017-07-20 NOTE — PROGRESS NOTES
[facility]  OFFICE PROCEDURE PROGRESS NOTE        Chart reviewed for the following:   Trinity Petersen MD, have reviewed the History, Physical and updated the Allergic reactions for [address] performed immediately prior to start of procedure:   I, Krunal Ng MD, have performed the following reviews on Ling Watson prior to the start of the procedure:            * Patient was identified by name and date of birth   * Agreement on procedure being performed was verified  * Risks and Benefits explained to the patient  * Procedure site verified and marked as necessary  * Patient was positioned for comfort  * Consent was signed and verified     Time: 0940      Date of procedure: 7/20/2017    Procedure performed by:  Krunal Ng MD    Provider assisted by: None    Patient assisted by: None    How tolerated by patient: tolerated the procedure well with no complications    Post Procedural Pain Scale: 2 - Hurts Little Bit    Comments: None      Botox Injection Note       Indication: patient has chronic recurrent migraine, has 7-10 less migraine days per month with botox injections  Previous preventatives: She tried depakote, topamax, lyrica, SSRIs, nortriptyline, and nicardipine (Ca ch blocker). She is also on baclofen  Procedure:   Botox concentration: 200 units in 4 ml of preservative-free normal saline. 31 sites injections, distribution as follow      Units/site  Sites Sides Subtotal    Procerus 5 1 1 5    5 1 2 10   Frontalis 5 2 2 20   Temporalis 5 4 2 40   Occipitalis 5 3 2 30   Upper cervical paraspinalis 5 2 2 20   Trapezius 5 3 2 30         200 units Botox were reconstituted, 155 units injected as above and the remainder was unavoidably wasted.      Patient tolerated procedure well.     _____________________________   Meron Wesley M.D.

## 2017-07-20 NOTE — MR AVS SNAPSHOT
Visit Information Date & Time Provider Department Dept. Phone Encounter #  
 7/20/2017  9:40 AM [facility] [phone] [id number] Your Appointments 11/15/2017 10:30 AM  
Follow Up with Mesha Sanchez NP [facility] [address] [facility]) Appt Note: f/u migraines, $0cp, jrb 11/16/16  
 [address], [address] [address]  
[address] [facility], [address] [place] [address], [address] [address] Upcoming Health Maintenance Date Due INFLUENZA AGE 9 TO ADULT 8/1/2017 PAP AKA CERVICAL CYTOLOGY 7/11/2019 DTaP/Tdap/Td series (2 - Td) 10/24/2022 Allergies as of 7/20/2017  Review Complete On: 7/12/2017 By: Tasha Shook MD  
  
 Severity Noted Reaction Type Reactions Monistat 1 [Tioconazole]  10/24/2012    Swelling Prozac [Fluoxetine]  10/24/2016   Side Effect Other (comments) \"felt like having a heart attack\" Sulfa Dyne  10/24/2012    Nausea and Vomiting Current Immunizations  Never Reviewed Name Date Influenza Vaccine 10/28/2016 TDAP Vaccine 10/24/2012  6:47 PM  
  
 Not reviewed this visit Vitals BP Pulse Resp Height(growth percentile) Weight(growth percentile) SpO2  
 118/68 68 20 5' 4\" (1.626 m) 187 lb (84.8 kg) 99% BMI OB Status Smoking Status 32.1 kg/m2 Hysterectomy Former Smoker Vitals History BMI and BSA Data Body Mass Index Body Surface Area  
 32.1 kg/m 2 1.96 m 2 Preferred Pharmacy Pharmacy Name Phone [phone], [address] [phone] Your Updated Medication List  
  
   
This list is accurate as of: 7/20/17 10:27 AM.  Always use your most recent med list.  
  
  
  
  
 baclofen 10 mg tablet Commonly known as:  LIORESAL  
take 1 tablet by mouth three times a day  
  
 citalopram 20 mg tablet Commonly known as:  CELEXA  
take 1 tablet by mouth once daily FISH OIL 1,000 mg Cap Generic drug:  omega-3 fatty acids-vitamin e Take 1 Cap by mouth.  
  
 levothyroxine 75 mcg tablet Commonly known as:  SYNTHROID  
take 1 tablet by mouth once daily BEFORE BREAKFAST  
  
 LYRICA 100 mg capsule Generic drug:  pregabalin  
take 1 capsule by mouth twice a day  
  
 modafinil 100 mg tablet Commonly known as:  PROVIGIL Take 1 Tab by mouth daily. Max Daily Amount: 100 mg.  
  
 montelukast 10 mg tablet Commonly known as:  SINGULAIR Take 1 Tab by mouth daily. niCARdipine 30 mg capsule Commonly known as:  CARDENE  
take 1 capsule by mouth three times a day  
  
 onabotulinumtoxinA 200 unit injection Commonly known as:  BOTOX Inject 200 units IM over 31 sites at head and neck every 12 weeks. Migraine Prevention. Inject IM neck/face , 31 FDA approved sites  Indications: MIGRAINE PREVENTION  
  
 SUMAtriptan 100 mg tablet Commonly known as:  IMITREX  
TAKE 1/2 TO 1 TABLET BY MOUTH AT ONSET OF HEADACHE. REPEAT AT 2 HOURS IF NEEDED  
  
 TROKENDI XR 50 mg capsule Generic drug:  topiramate ER  
take 1 tablet by mouth once daily Patient Instructions OnabotulinumtoxinA (By injection) OnabotulinumtoxinA (lt-b-hrs-qo-AUG-elc-tox-in-ay) Treats muscle stiffness, muscle spasms, excessive sweating, overactive bladder, or loss of bladder control. Prevents chronic migraine headaches. Improves the appearance of wrinkles on the face. Brand Name(s): Botox, Botox Cosmetic There may be other brand names for this medicine. When This Medicine Should Not Be Used: This medicine is not right for everyone. You should not receive this medicine if you had an allergic reaction to onabotulinumtoxinA or any other botulinum toxin product. How to Use This Medicine:  
Injectable · Your doctor will prescribe your exact dose and tell you how often it should be given. This medicine is given by a healthcare provider as a shot under your skin or into a muscle. · You may be given medicine to numb the area where the shot will be injected. If you receive the medicine around your eyes, you may be given eye drops or ointment to numb the area. After your injection, you may need to wear a protective contact lens or eye patch. · If you are being treated for excessive sweating, shave your underarms but do not use deodorant for 24 hours before your injection. Avoid exercise, hot foods or liquids, or anything else that could make you sweat for 30 minutes before your injection. · The recommended treatment schedule for chronic migraine is every 12 weeks. · This medicine works slowly. Once your condition has improved, the medicine will last about 3 months, then the effects will slowly go away. You might need more injections to treat your condition. ¨ Muscle spasms in the eyelids should improve within 3 to 10 days. ¨ Eye muscle problems should improve 1 or 2 days after the injection, and the improvement should last for 2 to 6 weeks. ¨ Neck pain should improve within 2 to 6 weeks. ¨ Arm stiffness should improve within 4 to 6 weeks. ¨ Facial lines or wrinkles should improve 1 or 2 days. · This medicine should come with a Medication Guide. Ask your pharmacist for a copy if you do not have one. · Missed dose:Call your doctor or pharmacist for instructions. Drugs and Foods to Avoid: Ask your doctor or pharmacist before using any other medicine, including over-the-counter medicines, vitamins, and herbal products. · Some foods and medicine can affect how onabotulinumtoxinA works. Tell your doctor if you are using any of the following: ¨ Aspirin or a blood thinner (such as ticlopidine, warfarin) ¨ Muscle relaxer ¨ Medicine for an infection (such as amikacin, gentamicin, streptomycin, tobramycin) · Tell your doctor if you have received an injection of any botulinum toxin product within the past 4 months. Warnings While Using This Medicine: · Tell your doctor if you are pregnant or breastfeeding, or if you have breathing or lung problems, bleeding problems, heart or blood vessel disease, or nerve or muscle problems (such as myasthenia gravis). Tell your doctor if you have ever had face surgery or if you have a urinary tract infection or trouble urinating, diabetes, or multiple sclerosis. · This medicine may cause the following problems: ¨ Muscle weakness, loss of bladder control, trouble swallowing, speaking, or breathing (caused by the toxin spreading to other parts of your body) · This medicine may make your muscles weak or cause vision problems. Do not drive or do anything else that could be dangerous until you know how this medicine affects you. · There are some warnings that only apply if you are receiving this medicine to treat the following: ¨ Injections near the eye: This medicine may reduce blinking, which can raise the risk of eye problems such as corneal exposure and ulcers. Tell your doctor right away if you notice that you are blinking less than usual or your eyes feel dry. ¨ Urinary incontinence: This medicine may cause autonomic dysreflexia, which can be a life-threatening condition. ¨ Overactive bladder: Check with your doctor right away if you have trouble urinating or a burning sensation while urinating. · This medicine contains products from donated human blood, so it may contain viruses, although the risk is low. Human donors and blood are always tested for viruses to keep the risk low. Talk with your doctor about this risk if you are concerned. · Your doctor will check your progress and the effects of this medicine at regular visits. Keep all appointments. Possible Side Effects While Using This Medicine:  
Call your doctor right away if you notice any of these side effects: · Allergic reaction: Itching or hives, swelling in your face or hands, swelling or tingling in your mouth or throat, chest tightness, trouble breathing · Blurred or double vision, droopy eyelids · Change in how much or how often you urinate, trouble urinating, or painful urination · Chest pain, slow or uneven heartbeat · Headache, increased sweating, warmth or redness in your face, neck, or arm · Muscle weakness · Trouble swallowing, talking, or breathing If you notice these less serious side effects, talk with your doctor: · Fever, chills, cough, stuffy or runny nose, sore throat, and body aches · Pain in your neck, back, arms, or legs · Redness, pain, tenderness, bruising, swelling, or weakness where the shot was given If you notice other side effects that you think are caused by this medicine, tell your doctor. Call your doctor for medical advice about side effects. You may report side effects to FDA at 8-907-XKZ-6478 © 2017 [address]  Information is for End User's use only and may not be sold, redistributed or otherwise used for commercial purposes. The above information is an  only. It is not intended as medical advice for individual conditions or treatments. Talk to your doctor, nurse or pharmacist before following any medical regimen to see if it is safe and effective for you. Raheem Auguste [phone] [facility]! Dear Geneva Spangler: Thank you for requesting a [employer] account. Our records indicate that you already have an active [employer] account. You can access your account anytime at [email or web address] Did you know that you can access your hospital and ER discharge instructions at any time in [employer]? You can also review all of your test results from your hospital stay or ER visit. Additional Information If you have questions, please visit the Frequently Asked Questions section of the [employer] website at [email or web address] Remember, [employer] is NOT to be used for urgent needs. For medical emergencies, dial 911. Now available from your iPhone and Android! Please provide this summary of care documentation to your next provider. Your primary care clinician is listed as Sloane Jeronimo. If you have any questions after today's visit, please call [phone].

## 2017-07-27 ENCOUNTER — PATIENT MESSAGE (OUTPATIENT)
Dept: NEUROLOGY | Age: 52
End: 2017-07-27

## 2017-07-31 RX ORDER — CITALOPRAM 40 MG/1
40 TABLET, FILM COATED ORAL DAILY
Qty: 30 TAB | Refills: 4 | Status: SHIPPED | OUTPATIENT
Start: 2017-07-31 | End: 2018-02-19 | Stop reason: SDUPTHER

## 2017-07-31 NOTE — TELEPHONE ENCOUNTER
From: Criss Sampson  To: Erna Schaeffer MD  Sent: 7/27/2017 8:34 AM EDT  Subject: Visit Follow-Up Question    Good morning Dr. Juancarlos Mirza! It's been a week since I have been in to see you and it's been been a week since I have upped the Trokendi to 75 mg. I remembered that Margie Francis upped it to 75 last year and we took it back down because I started having nerve spasms in my fingers and toes. Which is happening again. But it hasn't touched the headaches either. Should I take the Trokendi back to 50 mg and we try something else? Or do you have any other solutions? I'm trying very hard not to take anything for the headaches because I don't want to get into the rebound headaches but it's very difficult not to take something to take the edge off so I can get through the day and be able to work. Thanks for any advice you can give me.     Dulce Martinez

## 2017-08-15 ENCOUNTER — TELEPHONE (OUTPATIENT)
Dept: INTERNAL MEDICINE CLINIC | Age: 52
End: 2017-08-15

## 2017-08-15 DIAGNOSIS — M79.7 FIBROMYALGIA: Primary | ICD-10-CM

## 2017-08-25 RX ORDER — MONTELUKAST SODIUM 10 MG/1
TABLET ORAL
Qty: 30 TAB | Refills: 5 | Status: SHIPPED | OUTPATIENT
Start: 2017-08-25 | End: 2018-02-13 | Stop reason: SDUPTHER

## 2017-09-05 DIAGNOSIS — I10 ESSENTIAL HYPERTENSION: ICD-10-CM

## 2017-09-05 RX ORDER — NICARDIPINE HYDROCHLORIDE 30 MG/1
CAPSULE ORAL
Qty: 270 CAP | Refills: 2 | Status: SHIPPED | OUTPATIENT
Start: 2017-09-05 | End: 2018-05-30 | Stop reason: SDUPTHER

## 2017-09-08 ENCOUNTER — TELEPHONE (OUTPATIENT)
Dept: INTERNAL MEDICINE CLINIC | Age: 52
End: 2017-09-08

## 2017-09-08 NOTE — TELEPHONE ENCOUNTER
----- Message from Sara Prabhakar sent at 9/7/2017  4:20 PM EDT -----  Regarding: Dr. Araceli Lomax Specialist is requesting demographics information for the patient. (TAB)[phone] (e)[phone]      Copied/pasted from [facility]

## 2017-09-14 RX ORDER — LEVOTHYROXINE SODIUM 75 UG/1
TABLET ORAL
Qty: 30 TAB | Refills: 3 | Status: SHIPPED | OUTPATIENT
Start: 2017-09-14 | End: 2018-01-17 | Stop reason: SDUPTHER

## 2017-10-09 DIAGNOSIS — G43.809 OTHER MIGRAINE WITHOUT STATUS MIGRAINOSUS, NOT INTRACTABLE: ICD-10-CM

## 2017-10-09 RX ORDER — SUMATRIPTAN 100 MG/1
TABLET, FILM COATED ORAL
Qty: 9 TAB | Refills: 3 | Status: SHIPPED | OUTPATIENT
Start: 2017-10-09 | End: 2018-01-31 | Stop reason: SDUPTHER

## 2017-10-25 ENCOUNTER — TELEPHONE (OUTPATIENT)
Dept: NEUROLOGY | Age: 52
End: 2017-10-25

## 2017-10-25 NOTE — TELEPHONE ENCOUNTER
Contacted patient and informed her that we need to reschedule her botox appointment scheduled on Oct 26th to Nov 2nd at 2pm. Patient voices understanding and appreciation.

## 2017-11-02 ENCOUNTER — OFFICE VISIT (OUTPATIENT)
Dept: NEUROLOGY | Age: 52
End: 2017-11-02

## 2017-11-02 VITALS — DIASTOLIC BLOOD PRESSURE: 75 MMHG | OXYGEN SATURATION: 96 % | SYSTOLIC BLOOD PRESSURE: 110 MMHG | HEART RATE: 58 BPM

## 2017-11-02 NOTE — PROGRESS NOTES
Pain scale prior to procedure  5 /10  Pain scale post procedure    5 /10  Patient states 30  headaches a month & lasting  24  hrs  Been treated for headaches greater than 6 months  Consent signed     Instructions post injection discussed with patient   1. Do not lay flat for 4 hrs  2. Do not press on injection sites up to 24 hrs.         What to expect  Possible bleeding and/or swelling  at injection sites      Patient verbalizes understanding

## 2017-11-02 NOTE — PATIENT INSTRUCTIONS
OnabotulinumtoxinA (By injection)   OnabotulinumtoxinA (ep-u-ytv-qd-MEH-wuz-tox-in-ay)  Treats muscle stiffness, muscle spasms, excessive sweating, overactive bladder, or loss of bladder control. Prevents chronic migraine headaches. Improves the appearance of wrinkles on the face. Brand Name(s): Botox, Botox Cosmetic   There may be other brand names for this medicine. When This Medicine Should Not Be Used: This medicine is not right for everyone. You should not receive this medicine if you had an allergic reaction to onabotulinumtoxinA or any other botulinum toxin product. How to Use This Medicine:   Injectable  · Your doctor will prescribe your exact dose and tell you how often it should be given. This medicine is given by a healthcare provider as a shot under your skin or into a muscle. · You may be given medicine to numb the area where the shot will be injected. If you receive the medicine around your eyes, you may be given eye drops or ointment to numb the area. After your injection, you may need to wear a protective contact lens or eye patch. · If you are being treated for excessive sweating, shave your underarms but do not use deodorant for 24 hours before your injection. Avoid exercise, hot foods or liquids, or anything else that could make you sweat for 30 minutes before your injection. · The recommended treatment schedule for chronic migraine is every 12 weeks. · This medicine works slowly. Once your condition has improved, the medicine will last about 3 months, then the effects will slowly go away. You might need more injections to treat your condition. ¨ Muscle spasms in the eyelids should improve within 3 to 10 days. ¨ Eye muscle problems should improve 1 or 2 days after the injection, and the improvement should last for 2 to 6 weeks. ¨ Neck pain should improve within 2 to 6 weeks. ¨ Arm stiffness should improve within 4 to 6 weeks.   ¨ Facial lines or wrinkles should improve 1 or 2 days.  · This medicine should come with a Medication Guide. Ask your pharmacist for a copy if you do not have one. · Missed dose:Call your doctor or pharmacist for instructions. Drugs and Foods to Avoid:   Ask your doctor or pharmacist before using any other medicine, including over-the-counter medicines, vitamins, and herbal products. · Some foods and medicine can affect how onabotulinumtoxinA works. Tell your doctor if you are using any of the following:  ¨ Aspirin or a blood thinner (such as ticlopidine, warfarin)  ¨ Muscle relaxer  ¨ Medicine for an infection (such as amikacin, gentamicin, streptomycin, tobramycin)  · Tell your doctor if you have received an injection of any botulinum toxin product within the past 4 months. Warnings While Using This Medicine:   · Tell your doctor if you are pregnant or breastfeeding, or if you have breathing or lung problems, bleeding problems, heart or blood vessel disease, or nerve or muscle problems (such as myasthenia gravis). Tell your doctor if you have ever had face surgery or if you have a urinary tract infection or trouble urinating, diabetes, or multiple sclerosis. · This medicine may cause the following problems:  ¨ Muscle weakness, loss of bladder control, trouble swallowing, speaking, or breathing (caused by the toxin spreading to other parts of your body)  · This medicine may make your muscles weak or cause vision problems. Do not drive or do anything else that could be dangerous until you know how this medicine affects you. · There are some warnings that only apply if you are receiving this medicine to treat the following:   ¨ Injections near the eye: This medicine may reduce blinking, which can raise the risk of eye problems such as corneal exposure and ulcers. Tell your doctor right away if you notice that you are blinking less than usual or your eyes feel dry. ¨ Urinary incontinence:  This medicine may cause autonomic dysreflexia, which can be a life-threatening condition. ¨ Overactive bladder: Check with your doctor right away if you have trouble urinating or a burning sensation while urinating. · This medicine contains products from donated human blood, so it may contain viruses, although the risk is low. Human donors and blood are always tested for viruses to keep the risk low. Talk with your doctor about this risk if you are concerned. · Your doctor will check your progress and the effects of this medicine at regular visits. Keep all appointments. Possible Side Effects While Using This Medicine:   Call your doctor right away if you notice any of these side effects:  · Allergic reaction: Itching or hives, swelling in your face or hands, swelling or tingling in your mouth or throat, chest tightness, trouble breathing  · Blurred or double vision, droopy eyelids  · Change in how much or how often you urinate, trouble urinating, or painful urination  · Chest pain, slow or uneven heartbeat  · Headache, increased sweating, warmth or redness in your face, neck, or arm  · Muscle weakness  · Trouble swallowing, talking, or breathing  If you notice these less serious side effects, talk with your doctor:   · Fever, chills, cough, stuffy or runny nose, sore throat, and body aches  · Pain in your neck, back, arms, or legs  · Redness, pain, tenderness, bruising, swelling, or weakness where the shot was given  If you notice other side effects that you think are caused by this medicine, tell your doctor. Call your doctor for medical advice about side effects. You may report side effects to FDA at 3-623-FDA-6469  © 2017 [address] Information is for End User's use only and may not be sold, redistributed or otherwise used for commercial purposes. The above information is an  only. It is not intended as medical advice for individual conditions or treatments.  Talk to your doctor, nurse or pharmacist before following any medical regimen to see if it is safe and effective for you.

## 2017-11-02 NOTE — MR AVS SNAPSHOT
Visit Information Date & Time Provider Department Dept. Phone Encounter #  
 11/2/2017  2:00 PM MD Jcarlos Knight Neurology [facility] [phone] [id number] Your Appointments 11/15/2017 10:30 AM  
Follow Up with Armand Grissom NP [facility]) Appt Note: f/u migraines, $0cp, jrb 11/16/16  
 [address] [facility], 
[address] [place] [address], [address] [facility]  
[phone]  
  
   
 [address] [facility], [address] [place] [address], [address] [facility] [phone]  
  
    
 2/22/2018  2:00 PM  
PROCEDURE with Lynne Sanders MD  
[facility]) Appt Note: botox [place] [address] [facility] [address] [facility] 99 [id number] [phone]  
  
   
 [place] [address] [facility] [address] [id number] [address] Upcoming Health Maintenance Date Due INFLUENZA AGE 9 TO ADULT 8/1/2017 FOBT Q 1 YEAR AGE 50-75 8/18/2017 BREAST CANCER SCRN MAMMOGRAM 5/15/2019 PAP AKA CERVICAL CYTOLOGY 7/11/2019 DTaP/Tdap/Td series (3 - Td) 9/24/2027 Allergies as of 11/2/2017  Review Complete On: 7/20/2017 By: Lynne Sanders MD  
  
 Severity Noted Reaction Type Reactions Monistat 1 [Tioconazole]  10/24/2012    Swelling Prozac [Fluoxetine]  10/24/2016   Side Effect Other (comments) \"felt like having a heart attack\" Sulfa Dyne  10/24/2012    Nausea and Vomiting Current Immunizations  Reviewed on 9/28/2017 Name Date Influenza Vaccine 10/28/2016 TDAP Vaccine 10/24/2012  6:47 PM  
 Tdap 9/24/2017 Not reviewed this visit Vitals BP Pulse SpO2 OB Status Smoking Status 110/75 (!) 58 96% Hysterectomy Former Smoker Preferred Pharmacy Pharmacy Name Phone [phone], [address] [phone] Your Updated Medication List  
  
   
 This list is accurate as of: 11/2/17  2:39 PM.  Always use your most recent med list.  
  
  
  
  
 baclofen 10 mg tablet Commonly known as:  LIORESAL  
take 1 tablet by mouth three times a day * citalopram 20 mg tablet Commonly known as:  CELEXA  
take 1 tablet by mouth once daily * citalopram 40 mg tablet Commonly known as:  Myh Dieter Take 1 Tab by mouth daily. FISH OIL 1,000 mg Cap Generic drug:  omega-3 fatty acids-vitamin e Take 1 Cap by mouth.  
  
 levothyroxine 75 mcg tablet Commonly known as:  SYNTHROID  
TAKE 1 TABLET BY MOUTH ONCE DAILY BEFORE BREAKFAST  
  
 LYRICA 100 mg capsule Generic drug:  pregabalin  
take 1 capsule by mouth twice a day  
  
 modafinil 100 mg tablet Commonly known as:  PROVIGIL Take 1 Tab by mouth daily. Max Daily Amount: 100 mg.  
  
 montelukast 10 mg tablet Commonly known as:  SINGULAIR  
take 1 tablet by mouth once daily  
  
 niCARdipine 30 mg capsule Commonly known as:  CARDENE  
take 1 capsule by mouth three times a day * onabotulinumtoxinA 200 unit injection Commonly known as:  BOTOX Inject 200 units IM over 31 sites at head and neck every 12 weeks. Migraine Prevention. Inject IM neck/face , 31 FDA approved sites  Indications: MIGRAINE PREVENTION  
  
 * onabotulinumtoxinA 200 unit injection Commonly known as:  BOTOX Inject 155 units IM 31 FDA approved sites to face and neck every 12 weeks SUMAtriptan 100 mg tablet Commonly known as:  IMITREX  
TAKE 1/2 TO 1 TABLET BY MOUTH AT ONSET OF HEADACHE -- REPEAT IN 2 HOURS IF NEEDED * TROKENDI XR 50 mg capsule Generic drug:  topiramate ER  
take 1 tablet by mouth once daily * topiramate ER 25 mg capsule Commonly known as:  TROKENDI XR Take 1 Cap by mouth daily. * Notice: This list has 6 medication(s) that are the same as other medications prescribed for you.  Read the directions carefully, and ask your doctor or other care provider to review them with you. Patient Instructions OnabotulinumtoxinA (By injection) OnabotulinumtoxinA (ni-s-rsi-kj-TBV-jao-tox-in-ay) Treats muscle stiffness, muscle spasms, excessive sweating, overactive bladder, or loss of bladder control. Prevents chronic migraine headaches. Improves the appearance of wrinkles on the face. Brand Name(s): Botox, Botox Cosmetic There may be other brand names for this medicine. When This Medicine Should Not Be Used: This medicine is not right for everyone. You should not receive this medicine if you had an allergic reaction to onabotulinumtoxinA or any other botulinum toxin product. How to Use This Medicine:  
Injectable · Your doctor will prescribe your exact dose and tell you how often it should be given. This medicine is given by a healthcare provider as a shot under your skin or into a muscle. · You may be given medicine to numb the area where the shot will be injected. If you receive the medicine around your eyes, you may be given eye drops or ointment to numb the area. After your injection, you may need to wear a protective contact lens or eye patch. · If you are being treated for excessive sweating, shave your underarms but do not use deodorant for 24 hours before your injection. Avoid exercise, hot foods or liquids, or anything else that could make you sweat for 30 minutes before your injection. · The recommended treatment schedule for chronic migraine is every 12 weeks. · This medicine works slowly. Once your condition has improved, the medicine will last about 3 months, then the effects will slowly go away. You might need more injections to treat your condition. ¨ Muscle spasms in the eyelids should improve within 3 to 10 days. ¨ Eye muscle problems should improve 1 or 2 days after the injection, and the improvement should last for 2 to 6 weeks. ¨ Neck pain should improve within 2 to 6 weeks. ¨ Arm stiffness should improve within 4 to 6 weeks. ¨ Facial lines or wrinkles should improve 1 or 2 days. · This medicine should come with a Medication Guide. Ask your pharmacist for a copy if you do not have one. · Missed dose:Call your doctor or pharmacist for instructions. Drugs and Foods to Avoid: Ask your doctor or pharmacist before using any other medicine, including over-the-counter medicines, vitamins, and herbal products. · Some foods and medicine can affect how onabotulinumtoxinA works. Tell your doctor if you are using any of the following: ¨ Aspirin or a blood thinner (such as ticlopidine, warfarin) ¨ Muscle relaxer ¨ Medicine for an infection (such as amikacin, gentamicin, streptomycin, tobramycin) · Tell your doctor if you have received an injection of any botulinum toxin product within the past 4 months. Warnings While Using This Medicine: · Tell your doctor if you are pregnant or breastfeeding, or if you have breathing or lung problems, bleeding problems, heart or blood vessel disease, or nerve or muscle problems (such as myasthenia gravis). Tell your doctor if you have ever had face surgery or if you have a urinary tract infection or trouble urinating, diabetes, or multiple sclerosis. · This medicine may cause the following problems: ¨ Muscle weakness, loss of bladder control, trouble swallowing, speaking, or breathing (caused by the toxin spreading to other parts of your body) · This medicine may make your muscles weak or cause vision problems. Do not drive or do anything else that could be dangerous until you know how this medicine affects you. · There are some warnings that only apply if you are receiving this medicine to treat the following: ¨ Injections near the eye: This medicine may reduce blinking, which can raise the risk of eye problems such as corneal exposure and ulcers.  Tell your doctor right away if you notice that you are blinking less than usual or your eyes feel dry. ¨ Urinary incontinence: This medicine may cause autonomic dysreflexia, which can be a life-threatening condition. ¨ Overactive bladder: Check with your doctor right away if you have trouble urinating or a burning sensation while urinating. · This medicine contains products from donated human blood, so it may contain viruses, although the risk is low. Human donors and blood are always tested for viruses to keep the risk low. Talk with your doctor about this risk if you are concerned. · Your doctor will check your progress and the effects of this medicine at regular visits. Keep all appointments. Possible Side Effects While Using This Medicine:  
Call your doctor right away if you notice any of these side effects: · Allergic reaction: Itching or hives, swelling in your face or hands, swelling or tingling in your mouth or throat, chest tightness, trouble breathing · Blurred or double vision, droopy eyelids · Change in how much or how often you urinate, trouble urinating, or painful urination · Chest pain, slow or uneven heartbeat · Headache, increased sweating, warmth or redness in your face, neck, or arm · Muscle weakness · Trouble swallowing, talking, or breathing If you notice these less serious side effects, talk with your doctor: · Fever, chills, cough, stuffy or runny nose, sore throat, and body aches · Pain in your neck, back, arms, or legs · Redness, pain, tenderness, bruising, swelling, or weakness where the shot was given If you notice other side effects that you think are caused by this medicine, tell your doctor. Call your doctor for medical advice about side effects. You may report side effects to FDA at 1-099-FDA-1720 © 2017 [facility] Information is for End User's use only and may not be sold, redistributed or otherwise used for commercial purposes. The above information is an  only.  It is not intended as medical advice for individual conditions or treatments. Talk to your doctor, nurse or pharmacist before following any medical regimen to see if it is safe and effective for you. Introducing [facility]! Dear Sangeetha Sharif: Thank you for requesting a a[employer]uish[employer] account. Our records indicate that you already have an active aihuishou account. You can access your account anytime at [email or web address] Did you know that you can access your hospital and ER discharge instructions at any time in aihuishou? You can also review all of your test results from your hospital stay or ER visit. Additional Information If you have questions, please visit the Frequently Asked Questions section of the aihuishou website at [email or web address] Remember, aihuishou is NOT to be used for urgent needs. For medical emergencies, dial 911. Now available from your iPhone and Android! Please provide this summary of care documentation to your next provider. Your primary care clinician is listed as Joann Verde. If you have any questions after today's visit, please call [phone].

## 2017-11-02 NOTE — PROGRESS NOTES
[facility]  OFFICE PROCEDURE PROGRESS NOTE        Chart reviewed for the following:   Patricia Rodas MD, have reviewed the History, Physical and updated the Allergic reactions for [address] performed immediately prior to start of procedure:   I, Domingo Claudio MD, have performed the following reviews on [facility] prior to the start of the procedure:            * Patient was identified by name and date of birth   * Agreement on procedure being performed was verified  * Risks and Benefits explained to the patient  * Procedure site verified and marked as necessary  * Patient was positioned for comfort  * Consent was signed and verified     Time: 1400      Date of procedure: 11/2/2017    Procedure performed by:  Domingo Claudio MD    Provider assisted by: None    Patient assisted by: None    How tolerated by patient: tolerated the procedure well with no complications    Post Procedural Pain Scale: 2 - Hurts Little Bit    Comments: None      Botox Injection Note       Indication: patient has chronic recurrent migraine, has 7-10 less migraine days per month with botox injections  Previous preventatives: She tried depakote, topamax, lyrica, SSRIs, nortriptyline, and nicardipine (Ca ch blocker). She is also on baclofen  Procedure:   Botox concentration: 200 units in 4 ml of preservative-free normal saline. 31 sites injections, distribution as follow      Units/site  Sites Sides Subtotal    Procerus 5 1 1 5    5 1 2 10   Frontalis 5 2 2 20   Temporalis 5 4 2 40   Occipitalis 5 3 2 30   Upper cervical paraspinalis 5 2 2 20   Trapezius 5 3 2 30         200 units Botox were reconstituted, 155 units injected as above and the remainder was unavoidably wasted.      Patient tolerated procedure well.     _____________________________   Maki Zavala M.D.

## 2017-11-15 ENCOUNTER — OFFICE VISIT (OUTPATIENT)
Dept: NEUROLOGY | Age: 52
End: 2017-11-15

## 2017-11-15 VITALS
OXYGEN SATURATION: 98 % | BODY MASS INDEX: 33.12 KG/M2 | WEIGHT: 194 LBS | HEART RATE: 65 BPM | DIASTOLIC BLOOD PRESSURE: 78 MMHG | SYSTOLIC BLOOD PRESSURE: 110 MMHG | HEIGHT: 64 IN

## 2017-11-15 DIAGNOSIS — M79.7 FIBROMYALGIA: ICD-10-CM

## 2017-11-15 DIAGNOSIS — M62.838 MUSCLE SPASM: ICD-10-CM

## 2017-11-15 DIAGNOSIS — G43.109 MIGRAINE WITH AURA AND WITHOUT STATUS MIGRAINOSUS, NOT INTRACTABLE: Primary | ICD-10-CM

## 2017-11-15 RX ORDER — PREGABALIN 100 MG/1
CAPSULE ORAL
Qty: 60 CAP | Refills: 5 | Status: SHIPPED | OUTPATIENT
Start: 2017-11-15 | End: 2018-02-07 | Stop reason: SDUPTHER

## 2017-11-15 RX ORDER — BACLOFEN 10 MG/1
TABLET ORAL
Qty: 90 TAB | Refills: 11 | Status: SHIPPED | OUTPATIENT
Start: 2017-11-15 | End: 2018-11-09 | Stop reason: SDUPTHER

## 2017-11-15 NOTE — PATIENT INSTRUCTIONS
Try magnesium glycinate at bedtime for sleep, migraines, and anxiety, start with 100 mg and work up slowly to 400-800 mg as tolerated      10 [facility]   Statement to Patients  April 1, 2014      In an effort to ensure the large volume of patient prescription refills is processed in the most efficient and expeditious manner, we are asking our patients to assist us by calling your Pharmacy for all prescription refills, this will include also your  Mail Order Pharmacy. The pharmacy will contact our office electronically to continue the refill process. Please do not wait until the last minute to call your pharmacy. We need at least 48 hours (2days) to fill prescriptions. We also encourage you to call your pharmacy before going to  your prescription to make sure it is ready. With regard to controlled substance prescription refill requests (narcotic refills) that need to be picked up at our office, we ask your cooperation by providing us with at least 72 hours (3days) notice that you will need a refill. We will not refill narcotic prescription refill requests after 4:00pm on any weekday, Monday through Thursday, or after 2:00pm on Fridays, or on the weekends. We encourage everyone to explore another way of getting your prescription refill request processed using [employer], our patient web portal through our electronic medical record system. [employer] is an efficient and effective way to communicate your medication request directly to the office and  downloadable as an yee on your smart phone . [employer] also features a review functionality that allows you to view your medication list as well as leave messages for your physician. Are you ready to get connected? If so please review the attatched instructions or speak to any of our staff to get you set up right away! Thank you so much for your cooperation.  Should you have any questions please contact our Practice Administrator. The Physicians and Staff,  [facility]          A Healthy Lifestyle: Care Instructions  Your Care Instructions    A healthy lifestyle can help you feel good, stay at a healthy weight, and have plenty of energy for both work and play. A healthy lifestyle is something you can share with your whole family. A healthy lifestyle also can lower your risk for serious health problems, such as high blood pressure, heart disease, and diabetes. You can follow a few steps listed below to improve your health and the health of your family. Follow-up care is a key part of your treatment and safety. Be sure to make and go to all appointments, and call your doctor if you are having problems. It's also a good idea to know your test results and keep a list of the medicines you take. How can you care for yourself at home? · Do not eat too much sugar, fat, or fast foods. You can still have dessert and treats now and then. The goal is moderation. · Start small to improve your eating habits. Pay attention to portion sizes, drink less juice and soda pop, and eat more fruits and vegetables. ¨ Eat a healthy amount of food. A 3-ounce serving of meat, for example, is about the size of a deck of cards. Fill the rest of your plate with vegetables and whole grains. ¨ Limit the amount of soda and sports drinks you have every day. Drink more water when you are thirsty. ¨ Eat at least 5 servings of fruits and vegetables every day. It may seem like a lot, but it is not hard to reach this goal. A serving or helping is 1 piece of fruit, 1 cup of vegetables, or 2 cups of leafy, raw vegetables. Have an apple or some carrot sticks as an afternoon snack instead of a candy bar. Try to have fruits and/or vegetables at every meal.  · Make exercise part of your daily routine. You may want to start with simple activities, such as walking, bicycling, or slow swimming.  Try to be active 30 to 60 minutes every day. You do not need to do all 30 to 60 minutes all at once. For example, you can exercise 3 times a day for 10 or 20 minutes. Moderate exercise is safe for most people, but it is always a good idea to talk to your doctor before starting an exercise program.  · Keep moving. Tali Harrelldo the lawn, work in the garden, or [employer]. Take the stairs instead of the elevator at work. · If you smoke, quit. People who smoke have an increased risk for heart attack, stroke, cancer, and other lung illnesses. Quitting is hard, but there are ways to boost your chance of quitting tobacco for good. ¨ Use nicotine gum, patches, or lozenges. ¨ Ask your doctor about stop-smoking programs and medicines. ¨ Keep trying. In addition to reducing your risk of diseases in the future, you will notice some benefits soon after you stop using tobacco. If you have shortness of breath or asthma symptoms, they will likely get better within a few weeks after you quit. · Limit how much alcohol you drink. Moderate amounts of alcohol (up to 2 drinks a day for men, 1 drink a day for women) are okay. But drinking too much can lead to liver problems, high blood pressure, and other health problems. Family health  If you have a family, there are many things you can do together to improve your health. · Eat meals together as a family as often as possible. · Eat healthy foods. This includes fruits, vegetables, lean meats and dairy, and whole grains. · Include your family in your fitness plan. Most people think of activities such as jogging or tennis as the way to fitness, but there are many ways you and your family can be more active. Anything that makes you breathe hard and gets your heart pumping is exercise. Here are some tips:  ¨ Walk to do errands or to take your child to school or the bus. ¨ Go for a family bike ride after dinner instead of watching TV. Where can you learn more? Go to http://kunal-iggy.info/.   Enter D674 in the search box to learn more about \"A Healthy Lifestyle: Care Instructions. \"  Current as of: May 12, 2017  Content Version: 11.4  © 5801-8311 [employer], [employer]. Care instructions adapted under license by [employer] (which disclaims liability or warranty for this information). If you have questions about a medical condition or this instruction, always ask your healthcare professional. [place] [address] any warranty or liability for your use of this information.

## 2017-11-15 NOTE — PROGRESS NOTES
Date:            November 15, 2017    Name:  Catalina Calvillo  :  1965  MRN:  [id number]     PCP:  Lexi Bolaños MD    Chief Complaint   Patient presents with    Follow-up    Migraine         HISTORY OF PRESENT ILLNESS:  Cordell Harrison is a 50 y.o., female who presents today for follow up for migraines. She is getting Botox with Dr. Kat Schwarz for preventative, is also on Trokendi 50 mg. She is very happy with her botox. With that, she might get one migraine a month and can generally get rid of it with imitrex. She tolerates Imitrex well, no perceived side effects. . No side effects of Trokendi, either, although she did not tolerate doses higher than 50 mg daily. She does have occasional tingling in her fingers, but this is transient and not bothersome. She is trying to find someone to treat her fibromyalgia, will establish with Dr. Radha Fierro next month. She still has a daily tension headache right in the middle of her forehead, but this is mild and she is used to it. Migraines have improved dramatically since going on Botox. [phone] recap  Cordell Harrison is a 54 y.o., female who presents today for follow up for headaches. Migraines are well controlled with botox, she can't remember the last time she had one. Has Imitrex available but has not to take it very long time. Michael Wilder Has a daily low grade headache, pain is in bilateral temples goes backwards down to her neck. Will take tylenol or excedrin for that, but tries to avoid using pain medicine often. Only does this 1-2 times per week. Takes 50 mg trokendi qhs. Was previously having spells of confusion and transient visual changes, none now. Endorses some age related changes in her vision. She feels like she gets enough sleep, but she admits that she does not wake up feeling refreshed, her fitbit tells her that she is restless all night long. she was told that she had EDDIE, lost 60 pounds and stopped snoring so she stopped wearing her CPAP.  Has also been diagnosed with narcolepsy by another sleep medicine doctor. She states that she will be able to fall asleep while she standing up at work, has to continue moving to keep herself awake. Current Outpatient Prescriptions   Medication Sig    topiramate ER (TROKENDI XR) 50 mg capsule take 1 tablet by mouth once daily    onabotulinumtoxinA (BOTOX) 200 unit injection Inject 155 units to 31 FDA approved sites to face and neck    SUMAtriptan (IMITREX) 100 mg tablet TAKE 1/2 TO 1 TABLET BY MOUTH AT ONSET OF HEADACHE -- REPEAT IN 2 HOURS IF NEEDED    levothyroxine (SYNTHROID) 75 mcg tablet TAKE 1 TABLET BY MOUTH ONCE DAILY BEFORE BREAKFAST    niCARdipine (CARDENE) 30 mg capsule take 1 capsule by mouth three times a day    montelukast (SINGULAIR) 10 mg tablet take 1 tablet by mouth once daily    citalopram (CELEXA) 40 mg tablet Take 1 Tab by mouth daily.  onabotulinumtoxinA (BOTOX) 200 unit injection Inject 155 units IM 31 FDA approved sites to face and neck every 12 weeks    topiramate ER (TROKENDI XR) 25 mg capsule Take 1 Cap by mouth daily.  modafinil (PROVIGIL) 100 mg tablet Take 1 Tab by mouth daily. Max Daily Amount: 100 mg.  LYRICA 100 mg capsule take 1 capsule by mouth twice a day    citalopram (CELEXA) 20 mg tablet take 1 tablet by mouth once daily    baclofen (LIORESAL) 10 mg tablet take 1 tablet by mouth three times a day    onabotulinumtoxinA (BOTOX) 200 unit injection Inject 200 units IM over 31 sites at head and neck every 12 weeks. Migraine Prevention. Inject IM neck/face , 31 FDA approved sites  Indications: MIGRAINE PREVENTION    omega-3 fatty acids-vitamin e (FISH OIL) 1,000 mg cap Take 1 Cap by mouth. No current facility-administered medications for this visit.       Allergies   Allergen Reactions    Monistat 1 [Tioconazole] Swelling    Prozac [Fluoxetine] Other (comments)     \"felt like having a heart attack\"    Sulfa Dyne Nausea and Vomiting     Past Medical History: Diagnosis Date    Anxiety     Constipation     Essential hypertension     Fibromyalgia     Headache     Headache(784.0)     Hypothyroidism     Joint pain     Obstructive sleep apnea (adult) (pediatric) 2014    Sinus problem     Sleep trouble     Stress     Tiredness      Past Surgical History:   Procedure Laterality Date    HX BLADDER SUSPENSION  2009    HX GYN  1988        HX GYN  2009    ablation    HX HYSTERECTOMY  2011    CHRIS/BSO bleeding.  HX ORTHOPAEDIC      HX TUBAL LIGATION       Social History     Social History    Marital status:      Spouse name: N/A    Number of children: N/A    Years of education: N/A     Occupational History    Not on file. Social History Main Topics    Smoking status: Former Smoker    Smokeless tobacco: Never Used    Alcohol use Yes      Comment: social    Drug use: No    Sexual activity: Not on file     Other Topics Concern    Not on file     Social History Narrative     Family History   Problem Relation Age of Onset    Diabetes Father     Hypertension Father     Cancer Mother      skin    Cancer Maternal Aunt     Cancer Maternal Uncle     Diabetes Maternal Uncle     Diabetes Paternal Aunt     Diabetes Maternal Grandmother     Parkinsonism Maternal Grandmother     Other Paternal Grandmother      amylodosis         PHYSICAL EXAMINATION:    Visit Vitals    Ht 5' 4\" (1.626 m)    Wt 194 lb (88 kg)    BMI 33.3 kg/m2     General:  Well defined, obese,  and groomed individual in no acute distress. Neck: Supple, nontender, no bruits, no pain with resistance to active range of motion. Heart: Regular rate and rhythm, no murmurs, rub, or gallop. Normal S1S2. Lungs:  Clear to auscultation bilaterally with equal chest expansion, no cough, no wheeze  Musculoskeletal:  Extremities revealed no edema and had full range of motion of joints.     Psych:  Good mood and bright affect    NEUROLOGICAL EXAMINATION:     Mental Status:   Alert and oriented to person, place, and time with recent and remote memory intact. Attention span and concentration are normal. Speech is fluent with a full fund of knowledge. Cranial Nerves:    II, III, IV, VI:  Visual acuity grossly intact. Extra-ocular movements are full and fluid. No ptosis or nystagmus. V-XII: Hearing is grossly intact. Facial features are symmetric, with normal sensation and strength. The palate rises symmetrically and the tongue protrudes midline. Sternocleidomastoids 5/5. Motor Examination: Normal tone, bulk, and strength, 5/5 muscle strength throughout. Coordination:  Finger to nose testing was normal.   No resting or intention tremor  Gait and Station:  Steady while walking and with tandem walking. Normal arm swing. No pronator drift. No muscle wasting or fasciculations noted. ASSESSMENT AND PLAN    ICD-10-CM ICD-9-CM    1. Migraine with aura and without status migrainosus, not intractable G43.109 346.00    2. Muscle spasm M62.838 728.85 baclofen (LIORESAL) 10 mg tablet   3. Fibromyalgia M79.7 729.1 pregabalin (LYRICA) 100 mg capsule     24-year-old female seen in follow-up for chronic migraines. She has very good control of migraines of Botox, has about one headache days per month which is a reduction of at least 7-10 headache days per month since before Botox. She tried and failed Topamax, TCA, blood pressure medications before going on Botox. She does continue to have tension headaches, and also has difficulty controlling fibromyalgia. She will establish with a rheumatologist next month. 1.  Continue Botox 155 units every 12 weeks for chronic migraine protocol  2. Add magnesium glycinate 400-800 mg nightly for anxiety, sleep, headache prevention  3. Follow with rheumatology for management of fibromyalgia, but continue Lyrica 100 mg twice daily in the interim  4. Continue baclofen 10 mg 3 times daily for muscle spasms  5.   Continue Imitrex 100 mg as needed for migraine abortive therapy  6. Continue Trokendi 50 mg nightly for migraine prevention    Follow-up in February for Botox, in one year for continued evaluation of effectiveness, call sooner with concerns      Jason Norman NP    This note was created using voice recognition software. Despite editing, there may be syntax errors.

## 2017-11-15 NOTE — MR AVS SNAPSHOT
Visit Information Date & Time Provider Department Dept. Phone Encounter #  
 11/15/2017 10:30 AM Malia Forrester NP [facility] at [facility] [phone] [id number] Your Appointments 2/22/2018  2:00 PM  
PROCEDURE with Marlena Fuentes MD  
[facility]) Appt Note: botox [place] [address] [place] [address] [phone]  
  
   
 [place] [address] [id number] [address] Upcoming Health Maintenance Date Due Influenza Age 5 to Adult 8/1/2017 FOBT Q 1 YEAR AGE 50-75 8/18/2017 BREAST CANCER SCRN MAMMOGRAM 5/15/2019 PAP AKA CERVICAL CYTOLOGY 7/11/2019 DTaP/Tdap/Td series (3 - Td) 9/24/2027 Allergies as of 11/15/2017  Review Complete On: 11/15/2017 By: Otilia Justin LPN Severity Noted Reaction Type Reactions Monistat 1 [Tioconazole]  10/24/2012    Swelling Prozac [Fluoxetine]  10/24/2016   Side Effect Other (comments) \"felt like having a heart attack\" Sulfa Dyne  10/24/2012    Nausea and Vomiting Current Immunizations  Reviewed on 9/28/2017 Name Date Influenza Vaccine 10/28/2016 TDAP Vaccine 10/24/2012  6:47 PM  
 Tdap 9/24/2017 Not reviewed this visit You Were Diagnosed With   
  
 Codes Comments Migraine with aura and without status migrainosus, not intractable    -  Primary ICD-10-CM: G43.109 ICD-9-CM: 346.00 Muscle spasm     ICD-10-CM: Y96.030 ICD-9-CM: 728.85 Fibromyalgia     ICD-10-CM: M79.7 ICD-9-CM: 729.1 Vitals BP Pulse Height(growth percentile) Weight(growth percentile) SpO2 BMI  
 110/78 65 5' 4\" (1.626 m) 194 lb (88 kg) 98% 33.3 kg/m2 OB Status Smoking Status Hysterectomy Former Smoker Vitals History BMI and BSA Data Body Mass Index Body Surface Area  
 33.3 kg/m 2 1.99 m 2 Preferred Pharmacy Pharmacy Name Phone [facility], [address] [phone] Your Updated Medication List  
  
   
This list is accurate as of: 11/15/17 10:59 AM.  Always use your most recent med list.  
  
  
  
  
 baclofen 10 mg tablet Commonly known as:  LIORESAL  
take 1 tablet by mouth three times a day  
  
 citalopram 40 mg tablet Commonly known as:  Delona Nestle Take 1 Tab by mouth daily. FISH OIL 1,000 mg Cap Generic drug:  omega-3 fatty acids-vitamin e Take 1 Cap by mouth.  
  
 levothyroxine 75 mcg tablet Commonly known as:  SYNTHROID  
TAKE 1 TABLET BY MOUTH ONCE DAILY BEFORE BREAKFAST  
  
 modafinil 100 mg tablet Commonly known as:  PROVIGIL Take 1 Tab by mouth daily. Max Daily Amount: 100 mg.  
  
 montelukast 10 mg tablet Commonly known as:  SINGULAIR  
take 1 tablet by mouth once daily  
  
 niCARdipine 30 mg capsule Commonly known as:  CARDENE  
take 1 capsule by mouth three times a day  
  
 onabotulinumtoxinA 200 unit injection Commonly known as:  BOTOX Inject 200 units IM over 31 sites at head and neck every 12 weeks. Migraine Prevention. Inject IM neck/face , 31 FDA approved sites  Indications: MIGRAINE PREVENTION  
  
 pregabalin 100 mg capsule Commonly known as:  LYRICA  
take 1 capsule by mouth twice a day SUMAtriptan 100 mg tablet Commonly known as:  IMITREX  
TAKE 1/2 TO 1 TABLET BY MOUTH AT ONSET OF HEADACHE -- REPEAT IN 2 HOURS IF NEEDED  
  
 topiramate ER 50 mg capsule Commonly known as:  TROKENDI XR  
take 1 tablet by mouth once daily Prescriptions Printed Refills  
 pregabalin (LYRICA) 100 mg capsule 5 Sig: take 1 capsule by mouth twice a day Class: Print Prescriptions Sent to Pharmacy Refills  
 baclofen (LIORESAL) 10 mg tablet 11 Sig: take 1 tablet by mouth three times a day Class: Normal  
 [facility]acy: [facility], [address] Ph #: [phone] Patient Instructions Try magnesium glycinate at bedtime for sleep, migraines, and anxiety, start with 100 mg and work up slowly to 400-800 mg as tolerated PRESCRIPTION REFILL POLICY [address] [facility] Statement to Patients April 1, 2014 In an effort to ensure the large volume of patient prescription refills is processed in the most efficient and expeditious manner, we are asking our patients to assist us by calling your Pharmacy for all prescription refills, this will include also your  Mail Order Pharmacy. The pharmacy will contact our office electronically to continue the refill process. Please do not wait until the last minute to call your pharmacy. We need at least 48 hours (2days) to fill prescriptions. We also encourage you to call your pharmacy before going to  your prescription to make sure it is ready. With regard to controlled substance prescription refill requests (narcotic refills) that need to be picked up at our office, we ask your cooperation by providing us with at least 72 hours (3days) notice that you will need a refill. We will not refill narcotic prescription refill requests after 4:00pm on any weekday, Monday through Thursday, or after 2:00pm on Fridays, or on the weekends. We encourage everyone to explore another way of getting your prescription refill request processed using [employer], our patient web portal through our electronic medical record system. [employer] is an efficient and effective way to communicate your medication request directly to the office and  downloadable as an yee on your smart phone . [employer] also features a review functionality that allows you to view your medication list as well as leave messages for your physician. Are you ready to get connected? If so please review the attatched instructions or speak to any of our staff to get you set up right away! Thank you so much for your cooperation.  Should you have any questions please contact our Practice Administrator. The Physicians and Staff,  [facility] A Healthy Lifestyle: Care Instructions Your Care Instructions A healthy lifestyle can help you feel good, stay at a healthy weight, and have plenty of energy for both work and play. A healthy lifestyle is something you can share with your whole family. A healthy lifestyle also can lower your risk for serious health problems, such as high blood pressure, heart disease, and diabetes. You can follow a few steps listed below to improve your health and the health of your family. Follow-up care is a key part of your treatment and safety. Be sure to make and go to all appointments, and call your doctor if you are having problems. It's also a good idea to know your test results and keep a list of the medicines you take. How can you care for yourself at home? · Do not eat too much sugar, fat, or fast foods. You can still have dessert and treats now and then. The goal is moderation. · Start small to improve your eating habits. Pay attention to portion sizes, drink less juice and soda pop, and eat more fruits and vegetables. ¨ Eat a healthy amount of food. A 3-ounce serving of meat, for example, is about the size of a deck of cards. Fill the rest of your plate with vegetables and whole grains. ¨ Limit the amount of soda and sports drinks you have every day. Drink more water when you are thirsty. ¨ Eat at least 5 servings of fruits and vegetables every day. It may seem like a lot, but it is not hard to reach this goal. A serving or helping is 1 piece of fruit, 1 cup of vegetables, or 2 cups of leafy, raw vegetables. Have an apple or some carrot sticks as an afternoon snack instead of a candy bar. Try to have fruits and/or vegetables at every meal. 
· Make exercise part of your daily routine. You may want to start with simple activities, such as walking, bicycling, or slow swimming.  Try to be active 30 to 60 minutes every day. You do not need to do all 30 to 60 minutes all at once. For example, you can exercise 3 times a day for 10 or 20 minutes. Moderate exercise is safe for most people, but it is always a good idea to talk to your doctor before starting an exercise program. 
· Keep moving. [address] the lawn, work in the garden, or [employer]. Take the stairs instead of the elevator at work. · If you smoke, quit. People who smoke have an increased risk for heart attack, stroke, cancer, and other lung illnesses. Quitting is hard, but there are ways to boost your chance of quitting tobacco for good. ¨ Use nicotine gum, patches, or lozenges. ¨ Ask your doctor about stop-smoking programs and medicines. ¨ Keep trying. In addition to reducing your risk of diseases in the future, you will notice some benefits soon after you stop using tobacco. If you have shortness of breath or asthma symptoms, they will likely get better within a few weeks after you quit. · Limit how much alcohol you drink. Moderate amounts of alcohol (up to 2 drinks a day for men, 1 drink a day for women) are okay. But drinking too much can lead to liver problems, high blood pressure, and other health problems. Family health If you have a family, there are many things you can do together to improve your health. · Eat meals together as a family as often as possible. · Eat healthy foods. This includes fruits, vegetables, lean meats and dairy, and whole grains. · Include your family in your fitness plan. Most people think of activities such as jogging or tennis as the way to fitness, but there are many ways you and your family can be more active. Anything that makes you breathe hard and gets your heart pumping is exercise. Here are some tips: 
¨ Walk to do errands or to take your child to school or the bus. ¨ Go for a family bike ride after dinner instead of watching TV. Where can you learn more? Go to http://kunal-iggy.info/. Enter V920 in the search box to learn more about \"A Healthy Lifestyle: Care Instructions. \" Current as of: May 12, 2017 Content Version: 11.4 © 2327-9580 [employer]. Care instructions adapted under license by [employer] (which disclaims liability or warranty for this information). If you have questions about a medical condition or this instruction, always ask your healthcare professional. [address] any warranty or liability for your use of this information. Introducing [facility]! Dear [facility]: Thank you for requesting a [employer] account. Our records indicate that you already have an active Obsorb account. You can access your account anytime at [email or web address] Did you know that you can access your hospital and ER discharge instructions at any time in [employer]? You can also review all of your test results from your hospital stay or ER visit. Additional Information If you have questions, please visit the Frequently Asked Questions section of the [employer] website at [email or web address] Remember, Obsorb is NOT to be used for urgent needs. For medical emergencies, dial 911. Now available from your iPhone and Android! Please provide this summary of care documentation to your next provider. Your primary care clinician is listed as Edna Lindquistlay. If you have any questions after today's visit, please call [phone].

## 2018-01-01 RX ORDER — ONABOTULINUMTOXINA 200 [USP'U]/1
INJECTION, POWDER, LYOPHILIZED, FOR SOLUTION INTRADERMAL; INTRAMUSCULAR
Qty: 1 VIAL | Refills: 3 | Status: SHIPPED | OUTPATIENT
Start: 2018-01-01 | End: 2018-02-01 | Stop reason: SDUPTHER

## 2018-01-17 RX ORDER — LEVOTHYROXINE SODIUM 75 UG/1
TABLET ORAL
Qty: 30 TAB | Refills: 3 | Status: SHIPPED | OUTPATIENT
Start: 2018-01-17 | End: 2018-05-16 | Stop reason: SDUPTHER

## 2018-01-31 DIAGNOSIS — G43.809 OTHER MIGRAINE WITHOUT STATUS MIGRAINOSUS, NOT INTRACTABLE: ICD-10-CM

## 2018-01-31 RX ORDER — SUMATRIPTAN 100 MG/1
TABLET, FILM COATED ORAL
Qty: 9 TAB | Refills: 3 | Status: SHIPPED | OUTPATIENT
Start: 2018-01-31 | End: 2018-05-30 | Stop reason: SDUPTHER

## 2018-02-01 ENCOUNTER — TELEPHONE (OUTPATIENT)
Dept: NEUROLOGY | Age: 53
End: 2018-02-01

## 2018-02-01 NOTE — TELEPHONE ENCOUNTER
Received  [place] approval letter regarding Botox [id number]  Auth# [id number]  effective 01/30/18-01/31/19    PA case closed favorable for patient     Request for Botox prescription sent to Dr. Essence Flannery

## 2018-02-07 DIAGNOSIS — M79.7 FIBROMYALGIA: ICD-10-CM

## 2018-02-07 RX ORDER — PREGABALIN 100 MG/1
CAPSULE ORAL
Qty: 60 CAP | Refills: 5 | Status: SHIPPED | OUTPATIENT
Start: 2018-02-07 | End: 2018-08-08 | Stop reason: SDUPTHER

## 2018-02-07 NOTE — PROGRESS NOTES
Patient reports that she lost her Lyrica prescription. I checked the , and do not see that she has filled her prescription that I provided in November. Will order again. We will not reorder lost prescription of Lyrica again for this patient.

## 2018-02-08 ENCOUNTER — DOCUMENTATION ONLY (OUTPATIENT)
Dept: NEUROLOGY | Age: 53
End: 2018-02-08

## 2018-02-08 ENCOUNTER — TELEPHONE (OUTPATIENT)
Dept: NEUROLOGY | Age: 53
End: 2018-02-08

## 2018-02-08 NOTE — TELEPHONE ENCOUNTER
----- Message from Marlo Walker sent at 2/8/2018 12:08 PM EST -----  Regarding: Dr. Radha Queazda, from [address] [place] [address], called to set up delivery for the pt's Botox. [address] [place] [address] [phone].

## 2018-02-08 NOTE — TELEPHONE ENCOUNTER
Called [facility] to schedule delivery  While on phone no permission to ship from patient   [facility] reached out to patient for permission  They were unable to reach patient or leave   Will call to schedule delivery once permission is obtained   sent RFMarq message

## 2018-02-08 NOTE — TELEPHONE ENCOUNTER
----- Message from Allison Palacios sent at 2/8/2018  1:06 PM EST -----  Regarding: Dr. Santosh Castillo  The patient is requesting a call back from Kelvin Perez in regards to what was being discussed while messaging earlier today.  (t)[phone] (P)[phone]

## 2018-02-13 ENCOUNTER — TELEPHONE (OUTPATIENT)
Dept: NEUROLOGY | Age: 53
End: 2018-02-13

## 2018-02-13 RX ORDER — MONTELUKAST SODIUM 10 MG/1
TABLET ORAL
Qty: 30 TAB | Refills: 5 | Status: SHIPPED | OUTPATIENT
Start: 2018-02-13 | End: 2018-08-08 | Stop reason: SDUPTHER

## 2018-02-13 NOTE — TELEPHONE ENCOUNTER
botox 200 units received from [facility] pharmacy [phone].      RX# [id number]  Refills: 3      Patient has appt 2/22/18 2:00pm with Dr. Jesse Santamaria  [place] on [phone]

## 2018-02-14 ENCOUNTER — TELEPHONE (OUTPATIENT)
Dept: NEUROLOGY | Age: 53
End: 2018-02-14

## 2018-02-16 ENCOUNTER — TELEPHONE (OUTPATIENT)
Dept: NEUROLOGY | Age: 53
End: 2018-02-16

## 2018-02-16 NOTE — TELEPHONE ENCOUNTER
Received  [place] approval notice regarding Botox injection [id number]   [facility]# [id number]  Effective 01/30/18-01/30/19  tx 4     PA case closed favorable for patient

## 2018-02-19 RX ORDER — CITALOPRAM 40 MG/1
TABLET, FILM COATED ORAL
Qty: 30 TAB | Refills: 4 | Status: SHIPPED | OUTPATIENT
Start: 2018-02-19 | End: 2018-07-15 | Stop reason: SDUPTHER

## 2018-02-22 ENCOUNTER — OFFICE VISIT (OUTPATIENT)
Dept: NEUROLOGY | Age: 53
End: 2018-02-22

## 2018-02-22 VITALS
RESPIRATION RATE: 20 BRPM | SYSTOLIC BLOOD PRESSURE: 124 MMHG | DIASTOLIC BLOOD PRESSURE: 60 MMHG | HEIGHT: 64 IN | BODY MASS INDEX: 35 KG/M2 | WEIGHT: 205 LBS

## 2018-02-22 RX ORDER — METHYLPREDNISOLONE 4 MG/1
TABLET ORAL
Qty: 1 DOSE PACK | Refills: 0 | Status: SHIPPED | OUTPATIENT
Start: 2018-02-22 | End: 2018-03-19

## 2018-02-22 RX ORDER — ACETAMINOPHEN 325 MG/1
TABLET, FILM COATED ORAL
COMMUNITY
End: 2019-01-23 | Stop reason: ALTCHOICE

## 2018-02-22 NOTE — MR AVS SNAPSHOT
[address] [facility] 
 
 
 [place] [address] [address] [phone] Patient: True Levels MRN: [id number] Visit Information Date & Time Provider Department Dept. Phone Encounter #  
 2018  2:00 PM Simone Romo MD [facility] Neurology [facility] [phone] [id number] Your Appointments 2018 11:30 AM  
ROUTINE CARE with Melanie Gallegos MD  
[facility] Appt Note: injection for sciatic pain ?  pt has sciatic pain [facility] [address] [id number]  
[address] [place] [address] 2018  2:20 PM  
PROCEDURE with Simone Romo MD  
 [facility] ([facility]) Appt Note: botox [place] [address] [address] [phone]  
  
   
 [place] [address] [facility] [address] [id number] [address] Upcoming Health Maintenance Date Due Influenza Age 5 to Adult 2017 FOBT Q 1 YEAR AGE 50-75 2017 BREAST CANCER SCRN MAMMOGRAM 5/15/2019 PAP AKA CERVICAL CYTOLOGY 2019 DTaP/Tdap/Td series (3 - Td) 2027 Allergies as of 2018  Review Complete On: 11/15/2017 By: Jaci Riley NP Severity Noted Reaction Type Reactions Monistat 1 [Tioconazole]  10/24/2012    Swelling Prozac [Fluoxetine]  10/24/2016   Side Effect Other (comments) \"felt like having a heart attack\" Sulfa Dyne  10/24/2012    Nausea and Vomiting Current Immunizations  Reviewed on 2017 Name Date Influenza Vaccine 10/28/2016 TDAP Vaccine 10/24/2012  6:47 PM  
 Tdap 2017 Not reviewed this visit You Were Diagnosed With   
  
 Codes Comments Chronic migraine    -  Primary ICD-10-CM: F98.250 ICD-9-CM: 346.70 Vitals BP Resp Height(growth percentile) Weight(growth percentile) BMI OB Status 124/60 20 5' 4\" (1.626 m) 205 lb (93 kg) 35.19 kg/m2 Hysterectomy Smoking Status Former Smoker Vitals History BMI and BSA Data Body Mass Index Body Surface Area  
 35.19 kg/m 2 2.05 m 2 Preferred Pharmacy Pharmacy Name Phone [id number] [phone] Your Updated Medication List  
  
   
This list is accurate as of 2/22/18  2:29 PM.  Always use your most recent med list.  
  
  
  
  
 aspirin-acetaminophen-caffeine 250-250-65 mg per tablet Commonly known as:  EXCEDRIN ES Take 1 Tab by mouth. baclofen 10 mg tablet Commonly known as:  LIORESAL  
take 1 tablet by mouth three times a day  
  
 citalopram 40 mg tablet Commonly known as:  CELEXA  
take 1 tablet by mouth once daily FISH OIL 1,000 mg Cap Generic drug:  omega-3 fatty acids-vitamin e Take 1 Cap by mouth.  
  
 levothyroxine 75 mcg tablet Commonly known as:  SYNTHROID  
take 1 tablet by mouth every morning ON AN EMPTY STOMACH  
  
 methylPREDNISolone 4 mg tablet Commonly known as:  [address] Per dose pack instructions  
  
 modafinil 100 mg tablet Commonly known as:  PROVIGIL Take 1 Tab by mouth daily. Max Daily Amount: 100 mg.  
  
 montelukast 10 mg tablet Commonly known as:  SINGULAIR  
take 1 tablet by mouth once daily  
  
 niCARdipine 30 mg capsule Commonly known as:  CARDENE  
take 1 capsule by mouth three times a day * onabotulinumtoxinA 200 unit injection Commonly known as:  BOTOX INJECT 200 UNITS IM OVER 31 FDA APPROVED SITES AT HEAD AND NECK EVERY 12 WEEKS FOR MIGRAINE PREVENTION ( DISCARD UNUSED PORTION) * onabotulinumtoxinA 200 unit injection Commonly known as:  BOTOX Inject 155 units IM to 31 FDA approved sites face and neck every 12 weeks  
  
 pregabalin 100 mg capsule Commonly known as:  LYRICA  
take 1 capsule by mouth twice a day SUMAtriptan 100 mg tablet Commonly known as:  IMITREX  
TAKE 1/2 TO 1 TABLET BY MOUTH AT ONSET OF HEADACHE. REPEAT IN 2 HOURS IF NEEDED. topiramate ER 50 mg capsule Commonly known as:  TROKENDI XR  
take 1 tablet by mouth once daily TYLENOL 325 mg tablet Generic drug:  acetaminophen Take  by mouth every four (4) hours as needed for Pain. * Notice: This list has 2 medication(s) that are the same as other medications prescribed for you. Read the directions carefully, and ask your doctor or other care provider to review them with you. Prescriptions Sent to Pharmacy Refills  
 methylPREDNISolone (MEDROL DOSEPACK) 4 mg tablet 0 Sig: Per dose pack instructions Class: Normal  
 Pharm[facility]: [facility] [address]  #: [phone] Patient Instructions OnabotulinumtoxinA (By injection) OnabotulinumtoxinA (df-m-arq-fu-KDN-cgz-tox-in-ay) Treats muscle stiffness, muscle spasms, excessive sweating, overactive bladder, or loss of bladder control. Prevents chronic migraine headaches. Improves the appearance of wrinkles on the face. Brand Name(s): Botox, Botox Cosmetic There may be other brand names for this medicine. When This Medicine Should Not Be Used: This medicine is not right for everyone. You should not receive this medicine if you had an allergic reaction to onabotulinumtoxinA or any other botulinum toxin product. How to Use This Medicine:  
Injectable · Your doctor will prescribe your exact dose and tell you how often it should be given. This medicine is given by a healthcare provider as a shot under your skin or into a muscle. · You may be given medicine to numb the area where the shot will be injected. If you receive the medicine around your eyes, you may be given eye drops or ointment to numb the area.  After your injection, you may need to wear a protective contact lens or eye patch. · If you are being treated for excessive sweating, shave your underarms but do not use deodorant for 24 hours before your injection. Avoid exercise, hot foods or liquids, or anything else that could make you sweat for 30 minutes before your injection. · The recommended treatment schedule for chronic migraine is every 12 weeks. · This medicine works slowly. Once your condition has improved, the medicine will last about 3 months, then the effects will slowly go away. You might need more injections to treat your condition. ¨ Muscle spasms in the eyelids should improve within 3 to 10 days. ¨ Eye muscle problems should improve 1 or 2 days after the injection, and the improvement should last for 2 to 6 weeks. ¨ Neck pain should improve within 2 to 6 weeks. ¨ Arm stiffness should improve within 4 to 6 weeks. ¨ Facial lines or wrinkles should improve 1 or 2 days. · This medicine should come with a Medication Guide. Ask your pharmacist for a copy if you do not have one. · Missed dose:Call your doctor or pharmacist for instructions. Drugs and Foods to Avoid: Ask your doctor or pharmacist before using any other medicine, including over-the-counter medicines, vitamins, and herbal products. · Some foods and medicine can affect how onabotulinumtoxinA works. Tell your doctor if you are using any of the following: ¨ Aspirin or a blood thinner (such as ticlopidine, warfarin) ¨ Muscle relaxer ¨ Medicine for an infection (such as amikacin, gentamicin, streptomycin, tobramycin) · Tell your doctor if you have received an injection of any botulinum toxin product within the past 4 months. Warnings While Using This Medicine: · Tell your doctor if you are pregnant or breastfeeding, or if you have breathing or lung problems, bleeding problems, heart or blood vessel disease, or nerve or muscle problems (such as myasthenia gravis).  Tell your doctor if you have ever had face surgery or if you have a urinary tract infection or trouble urinating, diabetes, or multiple sclerosis. · This medicine may cause the following problems: ¨ Muscle weakness, loss of bladder control, trouble swallowing, speaking, or breathing (caused by the toxin spreading to other parts of your body) · This medicine may make your muscles weak or cause vision problems. Do not drive or do anything else that could be dangerous until you know how this medicine affects you. · There are some warnings that only apply if you are receiving this medicine to treat the following: ¨ Injections near the eye: This medicine may reduce blinking, which can raise the risk of eye problems such as corneal exposure and ulcers. Tell your doctor right away if you notice that you are blinking less than usual or your eyes feel dry. ¨ Urinary incontinence: This medicine may cause autonomic dysreflexia, which can be a life-threatening condition. ¨ Overactive bladder: Check with your doctor right away if you have trouble urinating or a burning sensation while urinating. · This medicine contains products from donated human blood, so it may contain viruses, although the risk is low. Human donors and blood are always tested for viruses to keep the risk low. Talk with your doctor about this risk if you are concerned. · Your doctor will check your progress and the effects of this medicine at regular visits. Keep all appointments. Possible Side Effects While Using This Medicine:  
Call your doctor right away if you notice any of these side effects: · Allergic reaction: Itching or hives, swelling in your face or hands, swelling or tingling in your mouth or throat, chest tightness, trouble breathing · Blurred or double vision, droopy eyelids · Change in how much or how often you urinate, trouble urinating, or painful urination · Chest pain, slow or uneven heartbeat · Headache, increased sweating, warmth or redness in your face, neck, or arm · Muscle weakness · Trouble swallowing, talking, or breathing If you notice these less serious side effects, talk with your doctor: · Fever, chills, cough, stuffy or runny nose, sore throat, and body aches · Pain in your neck, back, arms, or legs · Redness, pain, tenderness, bruising, swelling, or weakness where the shot was given If you notice other side effects that you think are caused by this medicine, tell your doctor. Call your doctor for medical advice about side effects. You may report side effects to FDA at 2-156-FDA-2845 © 2017 [address] Information is for End User's use only and may not be sold, redistributed or otherwise used for commercial purposes. The above information is an  only. It is not intended as medical advice for individual conditions or treatments. Talk to your doctor, nurse or pharmacist before following any medical regimen to see if it is safe and effective for you. Introducing [facility]! Dear [facility]: Thank you for requesting a [email or web address] account. Our records indicate that you already have an active [email or web address] account. You can access your account anytime at [email or web address] Did you know that you can access your hospital and ER discharge instructions at any time in [email or web address]? You can also review all of your test results from your hospital stay or ER visit. Additional Information If you have questions, please visit the Frequently Asked Questions section of the [email or web address] website at [email or web address] Remember, [email or web address] is NOT to be used for urgent needs. For medical emergencies, dial 911. Now available from your iPhone and Android! Please provide this summary of care documentation to your next provider. Your primary care clinician is listed as Tor Macedo.  If you have any questions after today's visit, please call [phone].

## 2018-02-22 NOTE — PROGRESS NOTES
Pain scale prior to procedure 7 /10  Pain scale post procedure    7 /10  Patient states 30  headaches a month & lasting  24  hrs  Been treated for headaches greater than 6 months  Consent signed     Instructions post injection discussed with patient   1. Do not lay flat for 4 hrs  2. Do not press on injection sites up to 24 hrs.         What to expect  Possible bleeding and/or swelling  at injection sites      Patient verbalizes understanding

## 2018-02-22 NOTE — PATIENT INSTRUCTIONS
OnabotulinumtoxinA (By injection)   OnabotulinumtoxinA (eg-x-xeo-hz-XBN-cmb-tox-in-ay)  Treats muscle stiffness, muscle spasms, excessive sweating, overactive bladder, or loss of bladder control. Prevents chronic migraine headaches. Improves the appearance of wrinkles on the face. Brand Name(s): Botox, Botox Cosmetic   There may be other brand names for this medicine. When This Medicine Should Not Be Used: This medicine is not right for everyone. You should not receive this medicine if you had an allergic reaction to onabotulinumtoxinA or any other botulinum toxin product. How to Use This Medicine:   Injectable  · Your doctor will prescribe your exact dose and tell you how often it should be given. This medicine is given by a healthcare provider as a shot under your skin or into a muscle. · You may be given medicine to numb the area where the shot will be injected. If you receive the medicine around your eyes, you may be given eye drops or ointment to numb the area. After your injection, you may need to wear a protective contact lens or eye patch. · If you are being treated for excessive sweating, shave your underarms but do not use deodorant for 24 hours before your injection. Avoid exercise, hot foods or liquids, or anything else that could make you sweat for 30 minutes before your injection. · The recommended treatment schedule for chronic migraine is every 12 weeks. · This medicine works slowly. Once your condition has improved, the medicine will last about 3 months, then the effects will slowly go away. You might need more injections to treat your condition. ¨ Muscle spasms in the eyelids should improve within 3 to 10 days. ¨ Eye muscle problems should improve 1 or 2 days after the injection, and the improvement should last for 2 to 6 weeks. ¨ Neck pain should improve within 2 to 6 weeks. ¨ Arm stiffness should improve within 4 to 6 weeks.   ¨ Facial lines or wrinkles should improve 1 or 2 days.  · This medicine should come with a Medication Guide. Ask your pharmacist for a copy if you do not have one. · Missed dose:Call your doctor or pharmacist for instructions. Drugs and Foods to Avoid:   Ask your doctor or pharmacist before using any other medicine, including over-the-counter medicines, vitamins, and herbal products. · Some foods and medicine can affect how onabotulinumtoxinA works. Tell your doctor if you are using any of the following:  ¨ Aspirin or a blood thinner (such as ticlopidine, warfarin)  ¨ Muscle relaxer  ¨ Medicine for an infection (such as amikacin, gentamicin, streptomycin, tobramycin)  · Tell your doctor if you have received an injection of any botulinum toxin product within the past 4 months. Warnings While Using This Medicine:   · Tell your doctor if you are pregnant or breastfeeding, or if you have breathing or lung problems, bleeding problems, heart or blood vessel disease, or nerve or muscle problems (such as myasthenia gravis). Tell your doctor if you have ever had face surgery or if you have a urinary tract infection or trouble urinating, diabetes, or multiple sclerosis. · This medicine may cause the following problems:  ¨ Muscle weakness, loss of bladder control, trouble swallowing, speaking, or breathing (caused by the toxin spreading to other parts of your body)  · This medicine may make your muscles weak or cause vision problems. Do not drive or do anything else that could be dangerous until you know how this medicine affects you. · There are some warnings that only apply if you are receiving this medicine to treat the following:   ¨ Injections near the eye: This medicine may reduce blinking, which can raise the risk of eye problems such as corneal exposure and ulcers. Tell your doctor right away if you notice that you are blinking less than usual or your eyes feel dry. ¨ Urinary incontinence:  This medicine may cause autonomic dysreflexia, which can be a life-threatening condition. ¨ Overactive bladder: Check with your doctor right away if you have trouble urinating or a burning sensation while urinating. · This medicine contains products from donated human blood, so it may contain viruses, although the risk is low. Human donors and blood are always tested for viruses to keep the risk low. Talk with your doctor about this risk if you are concerned. · Your doctor will check your progress and the effects of this medicine at regular visits. Keep all appointments. Possible Side Effects While Using This Medicine:   Call your doctor right away if you notice any of these side effects:  · Allergic reaction: Itching or hives, swelling in your face or hands, swelling or tingling in your mouth or throat, chest tightness, trouble breathing  · Blurred or double vision, droopy eyelids  · Change in how much or how often you urinate, trouble urinating, or painful urination  · Chest pain, slow or uneven heartbeat  · Headache, increased sweating, warmth or redness in your face, neck, or arm  · Muscle weakness  · Trouble swallowing, talking, or breathing  If you notice these less serious side effects, talk with your doctor:   · Fever, chills, cough, stuffy or runny nose, sore throat, and body aches  · Pain in your neck, back, arms, or legs  · Redness, pain, tenderness, bruising, swelling, or weakness where the shot was given  If you notice other side effects that you think are caused by this medicine, tell your doctor. Call your doctor for medical advice about side effects. You may report side effects to FDA at 9-811-FDA-3868  © 2017 [facility] Information is for End User's use only and may not be sold, redistributed or otherwise used for commercial purposes. The above information is an  only. It is not intended as medical advice for individual conditions or treatments.  Talk to your doctor, nurse or pharmacist before following any medical regimen to see if it is safe and effective for you.

## 2018-02-23 NOTE — PROGRESS NOTES
[facility]  OFFICE PROCEDURE PROGRESS NOTE        Chart reviewed for the following:   Isela Birmingham MD, have reviewed the History, Physical and updated the Allergic reactions for [address] performed immediately prior to start of procedure:   I, Maged Naranjo MD, have performed the following reviews on Kailey Fernandez prior to the start of the procedure:            * Patient was identified by name and date of birth   * Agreement on procedure being performed was verified  * Risks and Benefits explained to the patient  * Procedure site verified and marked as necessary  * Patient was positioned for comfort  * Consent was signed and verified     Time: 1400      Date of procedure: 2/22/2018    Procedure performed by:  Maged Naranjo MD    Provider assisted by: None    Patient assisted by: None    How tolerated by patient: tolerated the procedure well with no complications    Post Procedural Pain Scale: 2 - Hurts Little Bit    Comments: None      Botox Injection Note       Indication: patient has chronic recurrent migraine, has 7-10 less migraine days per month with botox injections  Previous preventatives: She tried depakote, topamax, lyrica, SSRIs, nortriptyline, and nicardipine (Ca ch blocker). She is also on baclofen  Procedure:   Botox concentration: 200 units in 4 ml of preservative-free normal saline. 31 sites injections, distribution as follow      Units/site  Sites Sides Subtotal    Procerus 5 1 1 5    5 1 2 10   Frontalis 5 2 2 20   Temporalis 5 4 2 40   Occipitalis 5 3 2 30   Upper cervical paraspinalis 5 2 2 20   Trapezius 5 3 2 30         200 units Botox were reconstituted, 155 units injected as above and the remainder was unavoidably wasted.      Patient tolerated procedure well.     _____________________________   Annamaria Blair M.D.

## 2018-03-19 ENCOUNTER — OFFICE VISIT (OUTPATIENT)
Dept: INTERNAL MEDICINE CLINIC | Age: 53
End: 2018-03-19

## 2018-03-19 VITALS
BODY MASS INDEX: 36.4 KG/M2 | HEIGHT: 64 IN | DIASTOLIC BLOOD PRESSURE: 82 MMHG | SYSTOLIC BLOOD PRESSURE: 128 MMHG | TEMPERATURE: 97.5 F | OXYGEN SATURATION: 97 % | RESPIRATION RATE: 18 BRPM | WEIGHT: 213.2 LBS | HEART RATE: 62 BPM

## 2018-03-19 DIAGNOSIS — M54.50 ACUTE BILATERAL LOW BACK PAIN WITHOUT SCIATICA: Primary | ICD-10-CM

## 2018-03-19 PROBLEM — E66.01 SEVERE OBESITY (BMI 35.0-39.9) WITH COMORBIDITY (HCC): Status: ACTIVE | Noted: 2018-03-19

## 2018-03-19 RX ORDER — TRIAMCINOLONE ACETONIDE 40 MG/ML
60 INJECTION, SUSPENSION INTRA-ARTICULAR; INTRAMUSCULAR ONCE
Qty: 1 ML | Refills: 0
Start: 2018-03-19 | End: 2018-03-19

## 2018-03-19 NOTE — PROGRESS NOTES
Rola Camargo is a 50 y.o. female who presents with complaints of lower back pain. No radicular pain. Some stretching. Is traveling and will be sitting, she wants an injection for the discomfort. Not taking any medication for it. Is on lyrica, topamax and baclofen regularly. Using heat. No bowel or bladder changes. 165# March 2016,  185# in July 2017, now 213#. Reviewed diet. [facility] an appt [facility]. She had colonoscopy August 2017, was not completed due to stool in colon. Dr. Kike Cesar. Still some constipation. BM every other day. Past Medical History:   Diagnosis Date    Anxiety     Constipation     Essential hypertension     Fibromyalgia     Headache     Headache(784.0)     Hypothyroidism     Joint pain     Obstructive sleep apnea (adult) (pediatric) 5/16/2014    Sinus problem     Sleep trouble     Stress     Tiredness        Family History   Problem Relation Age of Onset    Diabetes Father     Hypertension Father     Cancer Mother      skin    Cancer Maternal Aunt     Cancer Maternal Uncle     Diabetes Maternal Uncle     Diabetes Paternal Aunt     Diabetes Maternal Grandmother     Parkinsonism Maternal Grandmother     Other Paternal Grandmother      amylodosis       Social History     Social History    Marital status:      Spouse name: N/A    Number of children: N/A    Years of education: N/A     Occupational History    Not on file.      Social History Main Topics    Smoking status: Former Smoker    Smokeless tobacco: Never Used    Alcohol use Yes      Comment: social    Drug use: No    Sexual activity: Not Currently     Partners: Male     Birth control/ protection: None     Other Topics Concern    Not on file     Social History Narrative       Current Outpatient Prescriptions on File Prior to Visit   Medication Sig Dispense Refill    onabotulinumtoxinA (BOTOX) 200 unit injection Inject 155 units IM to 31 FDA approved sites face and neck every 12 weeks 200 Units 0    aspirin-acetaminophen-caffeine (EXCEDRIN ES) 250-250-65 mg per tablet Take 1 Tab by mouth.  acetaminophen (TYLENOL) 325 mg tablet Take  by mouth every four (4) hours as needed for Pain.  citalopram (CELEXA) 40 mg tablet take 1 tablet by mouth once daily 30 Tab 4    montelukast (SINGULAIR) 10 mg tablet take 1 tablet by mouth once daily 30 Tab 5    pregabalin (LYRICA) 100 mg capsule take 1 capsule by mouth twice a day 60 Cap 5    onabotulinumtoxinA (BOTOX) 200 unit injection INJECT 200 UNITS IM OVER 31 FDA APPROVED SITES AT HEAD AND NECK EVERY 12 WEEKS FOR MIGRAINE PREVENTION ( DISCARD UNUSED PORTION) 1 Vial 3    SUMAtriptan (IMITREX) 100 mg tablet TAKE 1/2 TO 1 TABLET BY MOUTH AT ONSET OF HEADACHE. REPEAT IN 2 HOURS IF NEEDED. 9 Tab 3    levothyroxine (SYNTHROID) 75 mcg tablet take 1 tablet by mouth every morning ON AN EMPTY STOMACH 30 Tab 3    baclofen (LIORESAL) 10 mg tablet take 1 tablet by mouth three times a day 90 Tab 11    topiramate ER (TROKENDI XR) 50 mg capsule take 1 tablet by mouth once daily 30 Cap 5    niCARdipine (CARDENE) 30 mg capsule take 1 capsule by mouth three times a day 270 Cap 2    omega-3 fatty acids-vitamin e (FISH OIL) 1,000 mg cap Take 1 Cap by mouth. No current facility-administered medications on file prior to visit. Review of Systems  Pertinent items are noted in HPI. Objective:     Visit Vitals    /82 (BP 1 Location: Left arm, BP Patient Position: Sitting)    Pulse 62    Temp 97.5 °F (36.4 °C) (Oral)    Resp 18    Ht 5' 4\" (1.626 m)    Wt 213 lb 3.2 oz (96.7 kg)    SpO2 97%    BMI 36.6 kg/m2     Gen: well appearing female  Resp:  No wheezing, no rhonchi, no rales. CV:  RRR, normal S1S2, no murmur. GI: soft, nontender, without masses. No hepatosplenomegaly.   Extrem:  +2 pulses, no edema, warm distally  Back- paraspinous muscle pain on palpation, no vertebral pain, able to move on exam table without difficulty  Neuro- alert, normal gait, negative SLR, 5/5 motor in lower extremities, normal sensory      Assessment/Plan:       ICD-10-CM ICD-9-CM    1. Acute bilateral low back pain without sciatica M54.5 724.2 TRIAMCINOLONE ACETONIDE INJ     338.19 triamcinolone acetonide (KENALOG) 40 mg/mL injection   given lower back exercises, use heat, NSAIDS prn. Follow-up Disposition:  Return if symptoms worsen or fail to improve.     Calvin Wilson MD

## 2018-03-19 NOTE — PROGRESS NOTES
Reviewed record in preparation for visit and have obtained necessary documentation. Identified pt with two pt identifiers(name and ). Chief Complaint   Patient presents with    Back Pain     want injection    Weight Loss     wants to discuss    Medication Evaluation       Health Maintenance Due   Topic Date Due    Flu Vaccine  2017    Stool testing for trace blood  2017       Ms. Ho Ruiz has a reminder for a \"due or due soon\" health maintenance. I have asked that she discuss this further with her primary care provider for follow-up on this health maintenance. Coordination of Care Questionnaire:  :     1) Have you been to an emergency room, urgent care clinic since your last visit? no   Hospitalized since your last visit? no             2) Have you seen or consulted any other health care providers outside of [address] [place] [address] since your last visit? no  (Include any pap smears or colon screenings in this section.)    3) In the event something were to happen to you and you were unable to speak on your behalf, do you have an Advance Directive/ Living Will in place stating your wishes? YES    Do you have an Advance Directive on file? no    4) Are you interested in receiving information on Advance Directives? NO    Patient is accompanied by self I have received verbal consent from Lena Sampson to discuss any/all medical information while they are present in the room.

## 2018-03-19 NOTE — MR AVS SNAPSHOT
[address] [facility] 
[phone] Patient: Ignacia Singh MRN: [id number] :1965 Visit Information Date & Time Provider Department Dept. Phone Encounter #  
 3/19/2018  2:00 PM Lori Teran, [address] [facility] [phone] [id number] Follow-up Instructions Return if symptoms worsen or fail to improve. Your Appointments 2018  2:20 PM  
PROCEDURE with Rigoberto Ford MD  
 [facility] ([facility]) Appt Note: botox [place]  [facility] [address] [facility], [facility] [address] [id number] [phone]  
  
   
 [place]  [facility] [phone] [id number] [address] [place] Upcoming Health Maintenance Date Due Influenza Age 5 to Adult 2017 FOBT Q 1 YEAR AGE 50-75 2017 BREAST CANCER SCRN MAMMOGRAM 5/15/2019 PAP AKA CERVICAL CYTOLOGY 2019 DTaP/Tdap/Td series (3 - Td) 2027 Allergies as of 3/19/2018  Review Complete On: 3/19/2018 By: Lori Teran MD  
  
 Severity Noted Reaction Type Reactions Monistat 1 [Tioconazole]  10/24/2012    Swelling Prozac [Fluoxetine]  10/24/2016   Side Effect Other (comments) \"felt like having a heart attack\" Sulfa Dyne  10/24/2012    Nausea and Vomiting Current Immunizations  Reviewed on 2017 Name Date Influenza Vaccine 10/28/2016 TDAP Vaccine 10/24/2012  6:47 PM  
 Tdap 2017 Not reviewed this visit You Were Diagnosed With   
  
 Codes Comments Acute bilateral low back pain without sciatica    -  Primary ICD-10-CM: M54.5 ICD-9-CM: 724.2, 338.19 Vitals BP Pulse Temp Resp Height(growth percentile) Weight(growth percentile) 128/82 (BP 1 Location: Left arm, BP Patient Position: Sitting) 62 97.5 °F (36.4 °C) (Oral) 18 5' 4\" (1.626 m) 213 lb 3.2 oz (96.7 kg) SpO2 BMI OB Status Smoking Status 97% 36.6 kg/m2 Hysterectomy Former Smoker BMI and BSA Data Body Mass Index Body Surface Area  
 36.6 kg/m 2 2.09 m 2 Preferred Pharmacy Pharmacy Name Phone [id number] [phone] Your Updated Medication List  
  
   
This list is accurate as of 3/19/18  2:43 PM.  Always use your most recent med list.  
  
  
  
  
 aspirin-acetaminophen-caffeine 250-250-65 mg per tablet Commonly known as:  EXCEDRIN ES Take 1 Tab by mouth. baclofen 10 mg tablet Commonly known as:  LIORESAL  
take 1 tablet by mouth three times a day  
  
 citalopram 40 mg tablet Commonly known as:  CELEXA  
take 1 tablet by mouth once daily FISH OIL 1,000 mg Cap Generic drug:  omega-3 fatty acids-vitamin e Take 1 Cap by mouth.  
  
 levothyroxine 75 mcg tablet Commonly known as:  SYNTHROID  
take 1 tablet by mouth every morning ON AN EMPTY STOMACH  
  
 montelukast 10 mg tablet Commonly known as:  SINGULAIR  
take 1 tablet by mouth once daily  
  
 niCARdipine 30 mg capsule Commonly known as:  CARDENE  
take 1 capsule by mouth three times a day * onabotulinumtoxinA 200 unit injection Commonly known as:  BOTOX INJECT 200 UNITS IM OVER 31 FDA APPROVED SITES AT HEAD AND NECK EVERY 12 WEEKS FOR MIGRAINE PREVENTION ( DISCARD UNUSED PORTION) * onabotulinumtoxinA 200 unit injection Commonly known as:  BOTOX Inject 155 units IM to 31 FDA approved sites face and neck every 12 weeks  
  
 pregabalin 100 mg capsule Commonly known as:  LYRICA  
take 1 capsule by mouth twice a day SUMAtriptan 100 mg tablet Commonly known as:  IMITREX  
TAKE 1/2 TO 1 TABLET BY MOUTH AT ONSET OF HEADACHE. REPEAT IN 2 HOURS IF NEEDED. topiramate ER 50 mg capsule Commonly known as:  TROKENDI XR  
take 1 tablet by mouth once daily  
  
 triamcinolone acetonide 40 mg/mL injection Commonly known as:  KENALOG 1.5 mL by IntraMUSCular route once for 1 dose. TYLENOL 325 mg tablet Generic drug:  acetaminophen Take  by mouth every four (4) hours as needed for Pain. * Notice: This list has 2 medication(s) that are the same as other medications prescribed for you. Read the directions carefully, and ask your doctor or other care provider to review them with you. We Performed the Following TRIAMCINOLONE ACETONIDE INJ [ [facility]] Follow-up Instructions Return if symptoms worsen or fail to improve. Patient Instructions Sacroiliac Joint Pain: Care Instructions Your Care Instructions The sacroiliac joints connect the spine and each side of the pelvis. These joints bear the weight and stress of your torso. This makes them easy to injure. Injury or overuse of these joints may cause low back pain. Stress on these joints can cause joint pain. Sacroiliac joint pain is more common in pregnant women. Certain kinds of arthritis also may cause this type of joint pain. Home treatment may help you feel better. So can avoiding activities that stress your back. Your doctor also may recommend physical therapy. This may include doing exercises and stretches to help with pain. You may also learn to use good posture. Follow-up care is a key part of your treatment and safety. Be sure to make and go to all appointments, and call your doctor if you are having problems. It's also a good idea to know your test results and keep a list of the medicines you take. How can you care for yourself at home? · Ask your doctor about light exercises that may help your back pain. Try to do light activity throughout the day. But make sure to take rests as needed. Find a comfortable position for rest, but don't stay in one position for too long. Avoid activities that cause pain.  
· To apply heat, put a warm water bottle, a heating pad set on low, or a warm cloth on your back. Do not go to sleep with a heating pad on your skin. · Put ice or a cold pack on your back for 10 to 20 minutes at a time. Put a thin cloth between the ice and your skin. · If the doctor gave you a prescription medicine for pain, take it as prescribed. · If you are not taking a prescription pain medicine, ask your doctor if you can take an over-the-counter pain medicine, such as acetaminophen (Tylenol), ibuprofen (Advil, Motrin), or naproxen (Aleve). Read and follow all instructions on the label. Take pain medicines exactly as directed. · Do not take two or more pain medicines at the same time unless the doctor told you to. Many pain medicines have acetaminophen, which is Tylenol. Too much acetaminophen (Tylenol) can be harmful. · To prevent future back pain, do exercises to stretch and strengthen your back and stomach. Learn how to use good posture, safe lifting techniques, and proper body mechanics. When should you call for help? Call 911 anytime you think you may need emergency care. For example, call if: 
? · You are unable to move a leg at all. ?Call your doctor now or seek immediate medical care if: 
? · You have new or worse symptoms in your legs or buttocks. Symptoms may include: ¨ Numbness or tingling. ¨ Weakness. ¨ Pain. ? · You lose bladder or bowel control. ? Watch closely for changes in your health, and be sure to contact your doctor if: 
? · You are not getting better as expected. Where can you learn more? Go to http://kunal-iggy.info/. Enter S524 in the search box to learn more about \"Sacroiliac Joint Pain: Care Instructions. \" Current as of: March 21, 2017 Content Version: 11.4 © 6004-8084 [employer]. Care instructions adapted under license by [employer] (which disclaims liability or warranty for this information).  If you have questions about a medical condition or this instruction, always ask your healthcare professional. [place] [address] any warranty or liability for your use of this information. Sacroiliac Pain: Exercises Your Care Instructions Here are some examples of typical rehabilitation exercises for your condition. Start each exercise slowly. Ease off the exercise if you start to have pain. Your doctor or physical therapist will tell you when you can start these exercises and which ones will work best for you. How to do the exercises Knee-to-chest stretch 1. Do not do the knee-to-chest exercise if it causes or increases back or leg pain. 2. Lie on your back with your knees bent and your feet flat on the floor. You can put a small pillow under your head and neck if it is more comfortable. 3. Grasp your hands under one knee and bring the knee to your chest, keeping the other foot flat on the floor. 4. Keep your lower back pressed to the floor. Hold for at least 15 to 30 seconds. 5. Relax and lower the knee to the starting position. Repeat with the other leg. 6. Repeat 2 to 4 times with each leg. 7. To get more stretch, keep your other leg flat on the floor while pulling your knee to your chest. 
Bridging 1. Lie on your back with both knees bent. Your knees should be bent about 90 degrees. 2. Tighten your belly muscles by pulling in your belly button toward your spine. Then push your feet into the floor, squeeze your buttocks, and lift your hips off the floor until your shoulders, hips, and knees are all in a straight line. 3. Hold for about 6 seconds as you continue to breathe normally, and then slowly lower your hips back down to the floor and rest for up to 10 seconds. 4. Repeat 8 to 12 times. Hip extension 1. Get down on your hands and knees on the floor. 2. Keeping your back and neck straight, lift one leg straight out behind you. When you lift your leg, keep your hips level.  Don't let your back twist, and don't let your hip drop toward the floor. 3. Hold for 6 seconds. Repeat 8 to 12 times with each leg. 4. If you feel steady and strong when you do this exercise, you can make it more difficult. To do this, when you lift your leg, also lift the opposite arm straight out in front of you. For example, lift the left leg and the right arm at the same time. (This is sometimes called the \"bird dog exercise. \") Hold for 6 seconds, and repeat 8 to 12 times on each side. Clamshell 1. Lie on your side with a pillow under your head. Keep your feet and knees together and your knees bent. 2. Raise your top knee, but keep your feet together. Do not let your hips roll back. Your legs should open up like a clamshell. 3. Hold for 6 seconds. 4. Slowly lower your knee back down. Rest for 10 seconds. 5. Repeat 8 to 12 times. 6. Switch to your other side and repeat steps 1 through 5. Hamstring wall stretch 1. Lie on your back in a doorway, with one leg through the open door. 2. Slide your affected leg up the wall to straighten your knee. You should feel a gentle stretch down the back of your leg. 1. Do not arch your back. 2. Do not bend either knee. 3. Keep one heel touching the floor and the other heel touching the wall. Do not point your toes. 3. Hold the stretch for at least 1 minute to begin. Then try to lengthen the time you hold the stretch to as long as 6 minutes. 4. Switch legs, and repeat steps 1 through 3. 
5. Repeat 2 to 4 times. 6. If you do not have a place to do this exercise in a doorway, there is another way to do it: 
7. Lie on your back, and bend one knee. 8. Loop a towel under the ball and toes of that foot, and hold the ends of the towel in your hands. 9. Straighten your knee, and slowly pull back on the towel. You should feel a gentle stretch down the back of your leg. 10. Switch legs, and repeat steps 1 through 3. 
11. Repeat 2 to 4 times. Lower abdominal strengthening 1. Lie on your back with your knees bent and your feet flat on the floor. 2. Tighten your belly muscles by pulling your belly button in toward your spine. 3. Lift one foot off the floor and bring your knee toward your chest, so that your knee is straight above your hip and your leg is bent like the letter \"L. \" 
4. Lift the other knee up to the same position. 5. Lower one leg at a time to the starting position. 6. Keep alternating legs until you have lifted each leg 8 to 12 times. 7. Be sure to keep your belly muscles tight and your back still as you are moving your legs. Be sure to breathe normally. Piriformis stretch 1. Lie on your back with your legs straight. 2. Lift your affected leg, and bend your knee. With your opposite hand, reach across your body, and then gently pull your knee toward your opposite shoulder. 3. Hold the stretch for 15 to 30 seconds. 4. Switch legs and repeat steps 1 through 3. 
5. Repeat 2 to 4 times. Follow-up care is a key part of your treatment and safety. Be sure to make and go to all appointments, and call your doctor if you are having problems. It's also a good idea to know your test results and keep a list of the medicines you take. Where can you learn more? Go to http://kunal-iggy.info/. Enter U713 in the search box to learn more about \"Sacroiliac Pain: Exercises. \" Current as of: March 21, 2017 Content Version: 11.4 © 6389-0726 [employer]. Care instructions adapted under license by [employer] (which disclaims liability or warranty for this information). If you have questions about a medical condition or this instruction, always ask your healthcare professional. [address] any warranty or liability for your use of this information. Introducing [facility] & HEALTH SERVICES! Dear Tena Arias: Thank you for requesting a [employer] account.   Our records indicate that you already have an active [employer] account. You can access your account anytime at [email or web address] Did you know that you can access your hospital and ER discharge instructions at any time in [employer]? You can also review all of your test results from your hospital stay or ER visit. Additional Information If you have questions, please visit the Frequently Asked Questions section of the [employer] website at [email or web address] Remember, [employer] is NOT to be used for urgent needs. For medical emergencies, dial 911. Now available from your iPhone and Android! Please provide this summary of care documentation to your next provider. Your primary care clinician is listed as Ninfa Cabrera. If you have any questions after today's visit, please call [phone].

## 2018-03-19 NOTE — PATIENT INSTRUCTIONS
Sacroiliac Joint Pain: Care Instructions  Your Care Instructions    The sacroiliac joints connect the spine and each side of the pelvis. These joints bear the weight and stress of your torso. This makes them easy to injure. Injury or overuse of these joints may cause low back pain. Stress on these joints can cause joint pain. Sacroiliac joint pain is more common in pregnant women. Certain kinds of arthritis also may cause this type of joint pain. Home treatment may help you feel better. So can avoiding activities that stress your back. Your doctor also may recommend physical therapy. This may include doing exercises and stretches to help with pain. You may also learn to use good posture. Follow-up care is a key part of your treatment and safety. Be sure to make and go to all appointments, and call your doctor if you are having problems. It's also a good idea to know your test results and keep a list of the medicines you take. How can you care for yourself at home? · Ask your doctor about light exercises that may help your back pain. Try to do light activity throughout the day. But make sure to take rests as needed. Find a comfortable position for rest, but don't stay in one position for too long. Avoid activities that cause pain. · To apply heat, put a warm water bottle, a heating pad set on low, or a warm cloth on your back. Do not go to sleep with a heating pad on your skin. · Put ice or a cold pack on your back for 10 to 20 minutes at a time. Put a thin cloth between the ice and your skin. · If the doctor gave you a prescription medicine for pain, take it as prescribed. · If you are not taking a prescription pain medicine, ask your doctor if you can take an over-the-counter pain medicine, such as acetaminophen (Tylenol), ibuprofen (Advil, Motrin), or naproxen (Aleve). Read and follow all instructions on the label. Take pain medicines exactly as directed.   · Do not take two or more pain medicines at the same time unless the doctor told you to. Many pain medicines have acetaminophen, which is Tylenol. Too much acetaminophen (Tylenol) can be harmful. · To prevent future back pain, do exercises to stretch and strengthen your back and stomach. Learn how to use good posture, safe lifting techniques, and proper body mechanics. When should you call for help? Call 911 anytime you think you may need emergency care. For example, call if:  ? · You are unable to move a leg at all. ?Call your doctor now or seek immediate medical care if:  ? · You have new or worse symptoms in your legs or buttocks. Symptoms may include:  ¨ Numbness or tingling. ¨ Weakness. ¨ Pain. ? · You lose bladder or bowel control. ? Watch closely for changes in your health, and be sure to contact your doctor if:  ? · You are not getting better as expected. Where can you learn more? Go to http://kunalRUSBASEiggy.info/. Enter U472 in the search box to learn more about \"Sacroiliac Joint Pain: Care Instructions. \"  Current as of: March 21, 2017  Content Version: 11.4  © 6291-7534 [employer]. Care instructions adapted under license by [employer] (which disclaims liability or warranty for this information). If you have questions about a medical condition or this instruction, always ask your healthcare professional. [address] any warranty or liability for your use of this information. Sacroiliac Pain: Exercises  Your Care Instructions  Here are some examples of typical rehabilitation exercises for your condition. Start each exercise slowly. Ease off the exercise if you start to have pain. Your doctor or physical therapist will tell you when you can start these exercises and which ones will work best for you. How to do the exercises  Knee-to-chest stretch    1. Do not do the knee-to-chest exercise if it causes or increases back or leg pain.   2. Lie on your back with your knees bent and your feet flat on the floor. You can put a small pillow under your head and neck if it is more comfortable. 3. Grasp your hands under one knee and bring the knee to your chest, keeping the other foot flat on the floor. 4. Keep your lower back pressed to the floor. Hold for at least 15 to 30 seconds. 5. Relax and lower the knee to the starting position. Repeat with the other leg. 6. Repeat 2 to 4 times with each leg. 7. To get more stretch, keep your other leg flat on the floor while pulling your knee to your chest.  Bridging    1. Lie on your back with both knees bent. Your knees should be bent about 90 degrees. 2. Tighten your belly muscles by pulling in your belly button toward your spine. Then push your feet into the floor, squeeze your buttocks, and lift your hips off the floor until your shoulders, hips, and knees are all in a straight line. 3. Hold for about 6 seconds as you continue to breathe normally, and then slowly lower your hips back down to the floor and rest for up to 10 seconds. 4. Repeat 8 to 12 times. Hip extension    1. Get down on your hands and knees on the floor. 2. Keeping your back and neck straight, lift one leg straight out behind you. When you lift your leg, keep your hips level. Don't let your back twist, and don't let your hip drop toward the floor. 3. Hold for 6 seconds. Repeat 8 to 12 times with each leg. 4. If you feel steady and strong when you do this exercise, you can make it more difficult. To do this, when you lift your leg, also lift the opposite arm straight out in front of you. For example, lift the left leg and the right arm at the same time. (This is sometimes called the \"bird dog exercise. \") Hold for 6 seconds, and repeat 8 to 12 times on each side. Clamshell    1. Lie on your side with a pillow under your head. Keep your feet and knees together and your knees bent. 2. Raise your top knee, but keep your feet together.  Do not let your hips roll back. Your legs should open up like a clamshell. 3. Hold for 6 seconds. 4. Slowly lower your knee back down. Rest for 10 seconds. 5. Repeat 8 to 12 times. 6. Switch to your other side and repeat steps 1 through 5. Hamstring wall stretch    1. Lie on your back in a doorway, with one leg through the open door. 2. Slide your affected leg up the wall to straighten your knee. You should feel a gentle stretch down the back of your leg. 1. Do not arch your back. 2. Do not bend either knee. 3. Keep one heel touching the floor and the other heel touching the wall. Do not point your toes. 3. Hold the stretch for at least 1 minute to begin. Then try to lengthen the time you hold the stretch to as long as 6 minutes. 4. Switch legs, and repeat steps 1 through 3.  5. Repeat 2 to 4 times. 6. If you do not have a place to do this exercise in a doorway, there is another way to do it:  7. Lie on your back, and bend one knee. 8. Loop a towel under the ball and toes of that foot, and hold the ends of the towel in your hands. 9. Straighten your knee, and slowly pull back on the towel. You should feel a gentle stretch down the back of your leg. 10. Switch legs, and repeat steps 1 through 3.  11. Repeat 2 to 4 times. Lower abdominal strengthening    1. Lie on your back with your knees bent and your feet flat on the floor. 2. Tighten your belly muscles by pulling your belly button in toward your spine. 3. Lift one foot off the floor and bring your knee toward your chest, so that your knee is straight above your hip and your leg is bent like the letter \"L. \"  4. Lift the other knee up to the same position. 5. Lower one leg at a time to the starting position. 6. Keep alternating legs until you have lifted each leg 8 to 12 times. 7. Be sure to keep your belly muscles tight and your back still as you are moving your legs. Be sure to breathe normally. Piriformis stretch    1.  Lie on your back with your legs straight. 2. Lift your affected leg, and bend your knee. With your opposite hand, reach across your body, and then gently pull your knee toward your opposite shoulder. 3. Hold the stretch for 15 to 30 seconds. 4. Switch legs and repeat steps 1 through 3.  5. Repeat 2 to 4 times. Follow-up care is a key part of your treatment and safety. Be sure to make and go to all appointments, and call your doctor if you are having problems. It's also a good idea to know your test results and keep a list of the medicines you take. Where can you learn more? Go to http://kunal-iggy.info/. Enter W138 in the search box to learn more about \"Sacroiliac Pain: Exercises. \"  Current as of: March 21, 2017  Content Version: 11.4  © 0920-1405 [employer]. Care instructions adapted under license by [employer] (which disclaims liability or warranty for this information). If you have questions about a medical condition or this instruction, always ask your healthcare professional. [address] any warranty or liability for your use of this information.

## 2018-05-01 ENCOUNTER — TELEPHONE (OUTPATIENT)
Dept: NEUROLOGY | Age: 53
End: 2018-05-01

## 2018-05-16 RX ORDER — LEVOTHYROXINE SODIUM 75 UG/1
TABLET ORAL
Qty: 30 TAB | Refills: 3 | Status: SHIPPED | OUTPATIENT
Start: 2018-05-16 | End: 2018-09-10 | Stop reason: SDUPTHER

## 2018-05-17 ENCOUNTER — OFFICE VISIT (OUTPATIENT)
Dept: NEUROLOGY | Age: 53
End: 2018-05-17

## 2018-05-17 VITALS
HEIGHT: 64 IN | HEART RATE: 82 BPM | RESPIRATION RATE: 16 BRPM | BODY MASS INDEX: 31.92 KG/M2 | DIASTOLIC BLOOD PRESSURE: 62 MMHG | OXYGEN SATURATION: 98 % | SYSTOLIC BLOOD PRESSURE: 110 MMHG | WEIGHT: 187 LBS

## 2018-05-17 NOTE — MR AVS SNAPSHOT
[address] [place] [address] 
 
 
 [place] [address] 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 Phone Encounter #  
 2018  2:20 PM Ronny Milian MD [facility] Neurology [facility] [phone] [id number] Your Appointments 2018  2:00 PM  
PROCEDURE with Ronny Milian MD  
[facility]) Appt Note: Botox [place]  [facility] [address] [address] [phone]  
  
   
 [place]  [address] [id number] [address] Upcoming Health Maintenance Date Due FOBT Q 1 YEAR AGE 50-75 2017 Influenza Age 5 to Adult 2018 BREAST CANCER SCRN MAMMOGRAM 5/15/2019 PAP AKA CERVICAL CYTOLOGY 2019 DTaP/Tdap/Td series (3 - Td) 2027 Allergies as of 2018  Review Complete On: 3/19/2018 By: Emmanuelle Eckert MD  
  
 Severity Noted Reaction Type Reactions Monistat 1 [Tioconazole]  10/24/2012    Swelling Prozac [Fluoxetine]  10/24/2016   Side Effect Other (comments) \"felt like having a heart attack\" Sulfa Dyne  10/24/2012    Nausea and Vomiting Current Immunizations  Reviewed on 2017 Name Date Influenza Vaccine 10/28/2016 TDAP Vaccine 10/24/2012  6:47 PM  
 Tdap 2017 Not reviewed this visit Vitals BP Pulse Resp Height(growth percentile) Weight(growth percentile) SpO2  
 110/62 82 16 5' 4\" (1.626 m) 187 lb (84.8 kg) 98% BMI OB Status Smoking Status 32.1 kg/m2 Hysterectomy Former Smoker Vitals History BMI and BSA Data Body Mass Index Body Surface Area  
 32.1 kg/m 2 1.96 m 2 Preferred Pharmacy Pharmacy Name Phone [id number], [address] [phone] Your Updated Medication List  
 This list is accurate as of 5/17/18  3:02 PM.  Always use your most recent med list.  
  
  
  
  
 aspirin-acetaminophen-caffeine 250-250-65 mg per tablet Commonly known as:  EXCEDRIN ES Take 1 Tab by mouth. baclofen 10 mg tablet Commonly known as:  LIORESAL  
take 1 tablet by mouth three times a day  
  
 citalopram 40 mg tablet Commonly known as:  CELEXA  
take 1 tablet by mouth once daily FISH OIL 1,000 mg Cap Generic drug:  omega-3 fatty acids-vitamin e Take 1 Cap by mouth.  
  
 levothyroxine 75 mcg tablet Commonly known as:  SYNTHROID  
take 1 tablet by mouth every morning ON AN EMPTY STOMACH  
  
 montelukast 10 mg tablet Commonly known as:  SINGULAIR  
take 1 tablet by mouth once daily  
  
 niCARdipine 30 mg capsule Commonly known as:  CARDENE  
take 1 capsule by mouth three times a day * onabotulinumtoxinA 200 unit injection Commonly known as:  BOTOX INJECT 200 UNITS IM OVER 31 FDA APPROVED SITES AT HEAD AND NECK EVERY 12 WEEKS FOR MIGRAINE PREVENTION ( DISCARD UNUSED PORTION) * onabotulinumtoxinA 200 unit injection Commonly known as:  BOTOX Inject 155 units IM to 31 FDA approved sites face and neck every 12 weeks  
  
 pregabalin 100 mg capsule Commonly known as:  LYRICA  
take 1 capsule by mouth twice a day SUMAtriptan 100 mg tablet Commonly known as:  IMITREX  
TAKE 1/2 TO 1 TABLET BY MOUTH AT ONSET OF HEADACHE. REPEAT IN 2 HOURS IF NEEDED. topiramate ER 50 mg capsule Commonly known as:  TROKENDI XR  
take 1 tablet by mouth once daily TYLENOL 325 mg tablet Generic drug:  acetaminophen Take  by mouth every four (4) hours as needed for Pain. * Notice: This list has 2 medication(s) that are the same as other medications prescribed for you. Read the directions carefully, and ask your doctor or other care provider to review them with you. Patient Instructions OnabotulinumtoxinA (By injection) OnabotulinumtoxinA (qw-n-chy-fo-ATU-lxm-tox-in-ay) Treats muscle stiffness, muscle spasms, excessive sweating, overactive bladder, or loss of bladder control. Prevents chronic migraine headaches. Improves the appearance of wrinkles on the face. Brand Name(s): Botox, Botox Cosmetic There may be other brand names for this medicine. When This Medicine Should Not Be Used: This medicine is not right for everyone. You should not receive this medicine if you had an allergic reaction to onabotulinumtoxinA or any other botulinum toxin product. How to Use This Medicine:  
Injectable · Your doctor will prescribe your exact dose and tell you how often it should be given. This medicine is given by a healthcare provider as a shot under your skin or into a muscle. · You may be given medicine to numb the area where the shot will be injected. If you receive the medicine around your eyes, you may be given eye drops or ointment to numb the area. After your injection, you may need to wear a protective contact lens or eye patch. · If you are being treated for excessive sweating, shave your underarms but do not use deodorant for 24 hours before your injection. Avoid exercise, hot foods or liquids, or anything else that could make you sweat for 30 minutes before your injection. · The recommended treatment schedule for chronic migraine is every 12 weeks. · This medicine works slowly. Once your condition has improved, the medicine will last about 3 months, then the effects will slowly go away. You might need more injections to treat your condition. ¨ Muscle spasms in the eyelids should improve within 3 to 10 days. ¨ Eye muscle problems should improve 1 or 2 days after the injection, and the improvement should last for 2 to 6 weeks. ¨ Neck pain should improve within 2 to 6 weeks. ¨ Arm stiffness should improve within 4 to 6 weeks. ¨ Facial lines or wrinkles should improve 1 or 2 days. · This medicine should come with a Medication Guide. Ask your pharmacist for a copy if you do not have one. · Missed dose:Call your doctor or pharmacist for instructions. Drugs and Foods to Avoid: Ask your doctor or pharmacist before using any other medicine, including over-the-counter medicines, vitamins, and herbal products. · Some foods and medicine can affect how onabotulinumtoxinA works. Tell your doctor if you are using any of the following: ¨ Aspirin or a blood thinner (such as ticlopidine, warfarin) ¨ Muscle relaxer ¨ Medicine for an infection (such as amikacin, gentamicin, streptomycin, tobramycin) · Tell your doctor if you have received an injection of any botulinum toxin product within the past 4 months. Warnings While Using This Medicine: · Tell your doctor if you are pregnant or breastfeeding, or if you have breathing or lung problems, bleeding problems, heart or blood vessel disease, or nerve or muscle problems (such as myasthenia gravis). Tell your doctor if you have ever had face surgery or if you have a urinary tract infection or trouble urinating, diabetes, or multiple sclerosis. · This medicine may cause the following problems: ¨ Muscle weakness, loss of bladder control, trouble swallowing, speaking, or breathing (caused by the toxin spreading to other parts of your body) · This medicine may make your muscles weak or cause vision problems. Do not drive or do anything else that could be dangerous until you know how this medicine affects you. · There are some warnings that only apply if you are receiving this medicine to treat the following: ¨ Injections near the eye: This medicine may reduce blinking, which can raise the risk of eye problems such as corneal exposure and ulcers. Tell your doctor right away if you notice that you are blinking less than usual or your eyes feel dry. ¨ Urinary incontinence:  This medicine may cause autonomic dysreflexia, which can be a life-threatening condition. ¨ Overactive bladder: Check with your doctor right away if you have trouble urinating or a burning sensation while urinating. · This medicine contains products from donated human blood, so it may contain viruses, although the risk is low. Human donors and blood are always tested for viruses to keep the risk low. Talk with your doctor about this risk if you are concerned. · Your doctor will check your progress and the effects of this medicine at regular visits. Keep all appointments. Possible Side Effects While Using This Medicine:  
Call your doctor right away if you notice any of these side effects: · Allergic reaction: Itching or hives, swelling in your face or hands, swelling or tingling in your mouth or throat, chest tightness, trouble breathing · Blurred or double vision, droopy eyelids · Change in how much or how often you urinate, trouble urinating, or painful urination · Chest pain, slow or uneven heartbeat · Headache, increased sweating, warmth or redness in your face, neck, or arm · Muscle weakness · Trouble swallowing, talking, or breathing If you notice these less serious side effects, talk with your doctor: · Fever, chills, cough, stuffy or runny nose, sore throat, and body aches · Pain in your neck, back, arms, or legs · Redness, pain, tenderness, bruising, swelling, or weakness where the shot was given If you notice other side effects that you think are caused by this medicine, tell your doctor. Call your doctor for medical advice about side effects. You may report side effects to FDA at 9-231-UCN-1605 © 2017 [address] Information is for End User's use only and may not be sold, redistributed or otherwise used for commercial purposes. The above information is an  only. It is not intended as medical advice for individual conditions or treatments.  Talk to your doctor, nurse or pharmacist before following any medical regimen to see if it is safe and effective for you. Introducing [facility]! Dear [facility]: Thank you for requesting a gestigon account. Our records indicate that you already have an active gestigon account. You can access your account anytime at [email or web address] Did you know that you can access your hospital and ER discharge instructions at any time in gestigon? You can also review all of your test results from your hospital stay or ER visit. Additional Information If you have questions, please visit the Frequently Asked Questions section of the gestigon website at [email or web address] Remember, gestigon is NOT to be used for urgent needs. For medical emergencies, dial 911. Now available from your iPhone and Android! Please provide this summary of care documentation to your next provider. Your primary care clinician is listed as Jessie Dotson. If you have any questions after today's visit, please call [phone].

## 2018-05-17 NOTE — PROGRESS NOTES
[facility]  OFFICE PROCEDURE PROGRESS NOTE        Chart reviewed for the following:   Britt Zavala MD, have reviewed the History, Physical and updated the Allergic reactions for [address] performed immediately prior to start of procedure:   I, Mahi Walter MD, have performed the following reviews on Zeke Stapleton prior to the start of the procedure:            * Patient was identified by name and date of birth   * Agreement on procedure being performed was verified  * Risks and Benefits explained to the patient  * Procedure site verified and marked as necessary  * Patient was positioned for comfort  * Consent was signed and verified     Time: 1420      Date of procedure: 5/17/2018    Procedure performed by:  Mahi Walter MD    Provider assisted by: None    Patient assisted by: None    How tolerated by patient: tolerated the procedure well with no complications    Post Procedural Pain Scale: 2 - Hurts Little Bit    Comments: None      Botox Injection Note       Indication: patient has chronic recurrent migraine, has 7-10 less migraine days per month with botox injections  Previous preventatives: She tried depakote, topamax, lyrica, SSRIs, nortriptyline, and nicardipine (Ca ch blocker). She is also on baclofen  Procedure:   Botox concentration: 200 units in 4 ml of preservative-free normal saline. 31 sites injections, distribution as follow      Units/site  Sites Sides Subtotal    Procerus 5 1 1 5    5 1 2 10   Frontalis 5 2 2 20   Temporalis 5 4 2 40   Occipitalis 5 3 2 30   Upper cervical paraspinalis 5 2 2 20   Trapezius 5 3 2 30         200 units Botox were reconstituted, 155 units injected as above and the remainder was unavoidably wasted.      Patient tolerated procedure well.     _____________________________   Michelle Burr M.D.

## 2018-05-17 NOTE — PATIENT INSTRUCTIONS
OnabotulinumtoxinA (By injection)   OnabotulinumtoxinA (ya-u-bkf-pg-VQK-vbh-tox-in-ay)  Treats muscle stiffness, muscle spasms, excessive sweating, overactive bladder, or loss of bladder control. Prevents chronic migraine headaches. Improves the appearance of wrinkles on the face. Brand Name(s): Botox, Botox Cosmetic   There may be other brand names for this medicine. When This Medicine Should Not Be Used: This medicine is not right for everyone. You should not receive this medicine if you had an allergic reaction to onabotulinumtoxinA or any other botulinum toxin product. How to Use This Medicine:   Injectable  · Your doctor will prescribe your exact dose and tell you how often it should be given. This medicine is given by a healthcare provider as a shot under your skin or into a muscle. · You may be given medicine to numb the area where the shot will be injected. If you receive the medicine around your eyes, you may be given eye drops or ointment to numb the area. After your injection, you may need to wear a protective contact lens or eye patch. · If you are being treated for excessive sweating, shave your underarms but do not use deodorant for 24 hours before your injection. Avoid exercise, hot foods or liquids, or anything else that could make you sweat for 30 minutes before your injection. · The recommended treatment schedule for chronic migraine is every 12 weeks. · This medicine works slowly. Once your condition has improved, the medicine will last about 3 months, then the effects will slowly go away. You might need more injections to treat your condition. ¨ Muscle spasms in the eyelids should improve within 3 to 10 days. ¨ Eye muscle problems should improve 1 or 2 days after the injection, and the improvement should last for 2 to 6 weeks. ¨ Neck pain should improve within 2 to 6 weeks. ¨ Arm stiffness should improve within 4 to 6 weeks.   ¨ Facial lines or wrinkles should improve 1 or 2 days.  · This medicine should come with a Medication Guide. Ask your pharmacist for a copy if you do not have one. · Missed dose:Call your doctor or pharmacist for instructions. Drugs and Foods to Avoid:   Ask your doctor or pharmacist before using any other medicine, including over-the-counter medicines, vitamins, and herbal products. · Some foods and medicine can affect how onabotulinumtoxinA works. Tell your doctor if you are using any of the following:  ¨ Aspirin or a blood thinner (such as ticlopidine, warfarin)  ¨ Muscle relaxer  ¨ Medicine for an infection (such as amikacin, gentamicin, streptomycin, tobramycin)  · Tell your doctor if you have received an injection of any botulinum toxin product within the past 4 months. Warnings While Using This Medicine:   · Tell your doctor if you are pregnant or breastfeeding, or if you have breathing or lung problems, bleeding problems, heart or blood vessel disease, or nerve or muscle problems (such as myasthenia gravis). Tell your doctor if you have ever had face surgery or if you have a urinary tract infection or trouble urinating, diabetes, or multiple sclerosis. · This medicine may cause the following problems:  ¨ Muscle weakness, loss of bladder control, trouble swallowing, speaking, or breathing (caused by the toxin spreading to other parts of your body)  · This medicine may make your muscles weak or cause vision problems. Do not drive or do anything else that could be dangerous until you know how this medicine affects you. · There are some warnings that only apply if you are receiving this medicine to treat the following:   ¨ Injections near the eye: This medicine may reduce blinking, which can raise the risk of eye problems such as corneal exposure and ulcers. Tell your doctor right away if you notice that you are blinking less than usual or your eyes feel dry. ¨ Urinary incontinence:  This medicine may cause autonomic dysreflexia, which can be a life-threatening condition. ¨ Overactive bladder: Check with your doctor right away if you have trouble urinating or a burning sensation while urinating. · This medicine contains products from donated human blood, so it may contain viruses, although the risk is low. Human donors and blood are always tested for viruses to keep the risk low. Talk with your doctor about this risk if you are concerned. · Your doctor will check your progress and the effects of this medicine at regular visits. Keep all appointments. Possible Side Effects While Using This Medicine:   Call your doctor right away if you notice any of these side effects:  · Allergic reaction: Itching or hives, swelling in your face or hands, swelling or tingling in your mouth or throat, chest tightness, trouble breathing  · Blurred or double vision, droopy eyelids  · Change in how much or how often you urinate, trouble urinating, or painful urination  · Chest pain, slow or uneven heartbeat  · Headache, increased sweating, warmth or redness in your face, neck, or arm  · Muscle weakness  · Trouble swallowing, talking, or breathing  If you notice these less serious side effects, talk with your doctor:   · Fever, chills, cough, stuffy or runny nose, sore throat, and body aches  · Pain in your neck, back, arms, or legs  · Redness, pain, tenderness, bruising, swelling, or weakness where the shot was given  If you notice other side effects that you think are caused by this medicine, tell your doctor. Call your doctor for medical advice about side effects. You may report side effects to FDA at 1-655-FDA-2532  © 2017 [address] Information is for End User's use only and may not be sold, redistributed or otherwise used for commercial purposes. The above information is an  only. It is not intended as medical advice for individual conditions or treatments.  Talk to your doctor, nurse or pharmacist before following any medical regimen to see if it is safe and effective for you.

## 2018-05-17 NOTE — PROGRESS NOTES
Botox Procedure  Pain scale prior to procedure 3  /10  Pain scale post procedure  4   /10  Patient states 1  headaches a month & lasting  12  hrs  Been treated for headaches greater than 6 months  Consent signed     Instructions post injection discussed with patient   1. Do not lay flat for 4 hrs  2. Do not press on injection sites up to 24 hrs.         What to expect  Possible bleeding and/or swelling  at injection sites      Patient verbalizes understanding

## 2018-05-30 DIAGNOSIS — G43.809 OTHER MIGRAINE WITHOUT STATUS MIGRAINOSUS, NOT INTRACTABLE: ICD-10-CM

## 2018-05-30 DIAGNOSIS — I10 ESSENTIAL HYPERTENSION: ICD-10-CM

## 2018-05-30 RX ORDER — SUMATRIPTAN 100 MG/1
TABLET, FILM COATED ORAL
Qty: 9 TAB | Refills: 3 | Status: SHIPPED | OUTPATIENT
Start: 2018-05-30 | End: 2018-09-26 | Stop reason: SDUPTHER

## 2018-05-30 RX ORDER — TOPIRAMATE 50 MG/1
CAPSULE, EXTENDED RELEASE ORAL
Qty: 30 CAP | Refills: 5 | Status: SHIPPED | OUTPATIENT
Start: 2018-05-30 | End: 2018-11-15

## 2018-05-30 RX ORDER — NICARDIPINE HYDROCHLORIDE 30 MG/1
CAPSULE ORAL
Qty: 270 CAP | Refills: 2 | Status: SHIPPED | OUTPATIENT
Start: 2018-05-30 | End: 2019-05-20 | Stop reason: SDUPTHER

## 2018-06-26 ENCOUNTER — TELEPHONE (OUTPATIENT)
Dept: INTERNAL MEDICINE CLINIC | Age: 53
End: 2018-06-26

## 2018-06-26 ENCOUNTER — HOSPITAL ENCOUNTER (OUTPATIENT)
Dept: MAMMOGRAPHY | Age: 53
Discharge: HOME | End: 2018-06-26
Attending: FAMILY MEDICINE
Payer: COMMERCIAL

## 2018-06-26 DIAGNOSIS — R92.8 ABNORMAL MAMMOGRAM: ICD-10-CM

## 2018-06-26 DIAGNOSIS — R92.8 ABNORMAL MAMMOGRAM: Primary | ICD-10-CM

## 2018-06-26 DIAGNOSIS — Z12.39 SCREENING BREAST EXAMINATION: ICD-10-CM

## 2018-06-26 PROCEDURE — 77065 DX MAMMO INCL CAD UNI: CPT

## 2018-06-26 PROCEDURE — 77067 SCR MAMMO BI INCL CAD: CPT

## 2018-07-15 RX ORDER — CITALOPRAM 40 MG/1
TABLET, FILM COATED ORAL
Qty: 30 TAB | Refills: 4 | Status: SHIPPED | OUTPATIENT
Start: 2018-07-15 | End: 2018-12-10 | Stop reason: SDUPTHER

## 2018-07-19 ENCOUNTER — PATIENT MESSAGE (OUTPATIENT)
Dept: NEUROLOGY | Age: 53
End: 2018-07-19

## 2018-07-26 RX ORDER — METHYLPREDNISOLONE 4 MG/1
TABLET ORAL
Qty: 1 DOSE PACK | Refills: 0 | Status: SHIPPED | OUTPATIENT
Start: 2018-07-26 | End: 2018-08-27

## 2018-07-30 ENCOUNTER — DOCUMENTATION ONLY (OUTPATIENT)
Dept: NEUROLOGY | Age: 53
End: 2018-07-30

## 2018-08-08 DIAGNOSIS — M79.7 FIBROMYALGIA: ICD-10-CM

## 2018-08-08 RX ORDER — MONTELUKAST SODIUM 10 MG/1
TABLET ORAL
Qty: 30 TAB | Refills: 5 | Status: SHIPPED | OUTPATIENT
Start: 2018-08-08 | End: 2019-02-08 | Stop reason: SDUPTHER

## 2018-08-08 RX ORDER — PREGABALIN 100 MG/1
CAPSULE ORAL
Qty: 60 CAP | Refills: 5 | Status: SHIPPED | OUTPATIENT
Start: 2018-08-08 | End: 2018-08-23 | Stop reason: SDUPTHER

## 2018-08-14 ENCOUNTER — TELEPHONE (OUTPATIENT)
Dept: NEUROLOGY | Age: 53
End: 2018-08-14

## 2018-08-14 NOTE — TELEPHONE ENCOUNTER
Received Botox 200 units  Rx: [id number]  Refills: [id number] [facility]  [phone]

## 2018-08-23 ENCOUNTER — OFFICE VISIT (OUTPATIENT)
Dept: NEUROLOGY | Age: 53
End: 2018-08-23

## 2018-08-23 VITALS
OXYGEN SATURATION: 97 % | WEIGHT: 175 LBS | SYSTOLIC BLOOD PRESSURE: 124 MMHG | RESPIRATION RATE: 18 BRPM | DIASTOLIC BLOOD PRESSURE: 80 MMHG | BODY MASS INDEX: 29.88 KG/M2 | HEART RATE: 73 BPM | HEIGHT: 64 IN

## 2018-08-23 DIAGNOSIS — M79.7 FIBROMYALGIA: ICD-10-CM

## 2018-08-23 RX ORDER — PREGABALIN 100 MG/1
CAPSULE ORAL
Qty: 60 CAP | Refills: 5 | Status: SHIPPED | OUTPATIENT
Start: 2018-08-23 | End: 2019-02-14 | Stop reason: SDUPTHER

## 2018-08-23 NOTE — PROGRESS NOTES
[facility]  OFFICE PROCEDURE PROGRESS NOTE        Chart reviewed for the following:   Lisandra Rosado MD, have reviewed the History, Physical and updated the Allergic reactions for [address] performed immediately prior to start of procedure:   I, Mylene Beverly MD, have performed the following reviews on Dyllan Hernandes prior to the start of the procedure:            * Patient was identified by name and date of birth   * Agreement on procedure being performed was verified  * Risks and Benefits explained to the patient  * Procedure site verified and marked as necessary  * Patient was positioned for comfort  * Consent was signed and verified     Time: 1400      Date of procedure: 8/23/2018    Procedure performed by:  Mylene Beverly MD    Provider assisted by: None    Patient assisted by: None    How tolerated by patient: tolerated the procedure well with no complications    Post Procedural Pain Scale: 2 - Hurts Little Bit    Comments: None      Botox Injection Note       Indication: patient has chronic recurrent migraine, has 7-10 less migraine days per month with botox injections  Previous preventatives: She tried depakote, topamax, lyrica, SSRIs, nortriptyline, and nicardipine (Ca ch blocker). She is also on baclofen  Procedure:   Botox concentration: 200 units in 4 ml of preservative-free normal saline. 31 sites injections, distribution as follow      Units/site  Sites Sides Subtotal    Procerus 5 1 1 5    5 1 2 10   Frontalis 5 2 2 20   Temporalis 5 4 2 40   Occipitalis 5 3 2 30   Upper cervical paraspinalis 5 2 2 20   Trapezius 5 3 2 30         200 units Botox were reconstituted, 155 units injected as above and the remainder was unavoidably wasted.      Patient tolerated procedure well.     _____________________________   Yann Kemp M.D.

## 2018-08-23 NOTE — TELEPHONE ENCOUNTER
Order placed for lyrica, 60 tab, 5 refills PO, per Verbal Order from Dr. Patricia Frazier on 8/23/2018 due to Fibromyalgia.

## 2018-08-23 NOTE — PROGRESS NOTES
Botox Procedure  Pain scale prior to procedure 7 /10  Pain scale post procedure   7 /10  Patient states 2  headaches a month & lasting  4  hrs  Been treated for headaches greater than 6 months  Consent signed     Instructions post injection discussed with patient   1. Do not lay flat for 4 hrs  2. Do not press on injection sites up to 24 hrs.         What to expect  Possible bleeding and/or swelling  at injection sites      Patient verbalizes understanding

## 2018-08-23 NOTE — PATIENT INSTRUCTIONS
OnabotulinumtoxinA (By injection)   OnabotulinumtoxinA (sw-j-ljd-dp-WRS-zax-tox-in-ay)  Treats muscle stiffness, muscle spasms, excessive sweating, overactive bladder, or loss of bladder control. Prevents chronic migraine headaches. Improves the appearance of wrinkles on the face. Brand Name(s): Botox, Botox Cosmetic   There may be other brand names for this medicine. When This Medicine Should Not Be Used: This medicine is not right for everyone. You should not receive this medicine if you had an allergic reaction to onabotulinumtoxinA or any other botulinum toxin product. How to Use This Medicine:   Injectable  · Your doctor will prescribe your exact dose and tell you how often it should be given. This medicine is given by a healthcare provider as a shot under your skin or into a muscle. · You may be given medicine to numb the area where the shot will be injected. If you receive the medicine around your eyes, you may be given eye drops or ointment to numb the area. After your injection, you may need to wear a protective contact lens or eye patch. · If you are being treated for excessive sweating, shave your underarms but do not use deodorant for 24 hours before your injection. Avoid exercise, hot foods or liquids, or anything else that could make you sweat for 30 minutes before your injection. · The recommended treatment schedule for chronic migraine is every 12 weeks. · This medicine works slowly. Once your condition has improved, the medicine will last about 3 months, then the effects will slowly go away. You might need more injections to treat your condition. ¨ Muscle spasms in the eyelids should improve within 3 to 10 days. ¨ Eye muscle problems should improve 1 or 2 days after the injection, and the improvement should last for 2 to 6 weeks. ¨ Neck pain should improve within 2 to 6 weeks. ¨ Arm stiffness should improve within 4 to 6 weeks.   ¨ Facial lines or wrinkles should improve 1 or 2 days.  · This medicine should come with a Medication Guide. Ask your pharmacist for a copy if you do not have one. · Missed dose:Call your doctor or pharmacist for instructions. Drugs and Foods to Avoid:   Ask your doctor or pharmacist before using any other medicine, including over-the-counter medicines, vitamins, and herbal products. · Some foods and medicine can affect how onabotulinumtoxinA works. Tell your doctor if you are using any of the following:  ¨ Aspirin or a blood thinner (such as ticlopidine, warfarin)  ¨ Muscle relaxer  ¨ Medicine for an infection (such as amikacin, gentamicin, streptomycin, tobramycin)  · Tell your doctor if you have received an injection of any botulinum toxin product within the past 4 months. Warnings While Using This Medicine:   · Tell your doctor if you are pregnant or breastfeeding, or if you have breathing or lung problems, bleeding problems, heart or blood vessel disease, or nerve or muscle problems (such as myasthenia gravis). Tell your doctor if you have ever had face surgery or if you have a urinary tract infection or trouble urinating, diabetes, or multiple sclerosis. · This medicine may cause the following problems:  ¨ Muscle weakness, loss of bladder control, trouble swallowing, speaking, or breathing (caused by the toxin spreading to other parts of your body)  · This medicine may make your muscles weak or cause vision problems. Do not drive or do anything else that could be dangerous until you know how this medicine affects you. · There are some warnings that only apply if you are receiving this medicine to treat the following:   ¨ Injections near the eye: This medicine may reduce blinking, which can raise the risk of eye problems such as corneal exposure and ulcers. Tell your doctor right away if you notice that you are blinking less than usual or your eyes feel dry. ¨ Urinary incontinence:  This medicine may cause autonomic dysreflexia, which can be a life-threatening condition. ¨ Overactive bladder: Check with your doctor right away if you have trouble urinating or a burning sensation while urinating. · This medicine contains products from donated human blood, so it may contain viruses, although the risk is low. Human donors and blood are always tested for viruses to keep the risk low. Talk with your doctor about this risk if you are concerned. · Your doctor will check your progress and the effects of this medicine at regular visits. Keep all appointments. Possible Side Effects While Using This Medicine:   Call your doctor right away if you notice any of these side effects:  · Allergic reaction: Itching or hives, swelling in your face or hands, swelling or tingling in your mouth or throat, chest tightness, trouble breathing  · Blurred or double vision, droopy eyelids  · Change in how much or how often you urinate, trouble urinating, or painful urination  · Chest pain, slow or uneven heartbeat  · Headache, increased sweating, warmth or redness in your face, neck, or arm  · Muscle weakness  · Trouble swallowing, talking, or breathing  If you notice these less serious side effects, talk with your doctor:   · Fever, chills, cough, stuffy or runny nose, sore throat, and body aches  · Pain in your neck, back, arms, or legs  · Redness, pain, tenderness, bruising, swelling, or weakness where the shot was given  If you notice other side effects that you think are caused by this medicine, tell your doctor. Call your doctor for medical advice about side effects. You may report side effects to FDA at 1-235-FDA-0403  © 2017 [address] Information is for End User's use only and may not be sold, redistributed or otherwise used for commercial purposes. The above information is an  only. It is not intended as medical advice for individual conditions or treatments.  Talk to your doctor, nurse or pharmacist before following any medical regimen to see if it is safe and effective for you.

## 2018-08-23 NOTE — MR AVS SNAPSHOT
[facility] 
 
 
 [place] [address] [facility] [address] [phone] Patient: Diann Bradley MRN: [id number] :1965 Visit Information Date & Time Provider Department Dept. Phone Encounter #  
 2018  2:00 PM Liz Flores MD [facility] Neurology [facility] [phone] [id number] Your Appointments 2018  9:00 AM  
PHYSICAL PRE OP with Arie Andrade MD  
[facility] [address] [facility]) Appt Note: CPE 18 ALG; r/s CPE 18 ALG  
 [address] [facility].  
[phone]  
  
   
 [address] [facility] [address] [id number]  
  
    
 11/15/2018  2:00 PM  
PROCEDURE with Liz Flores MD  
[facility]) Appt Note: Botox [place]  [address] [facility] [address] [phone]  
  
   
 [place]  [address] [address] [address] Upcoming Health Maintenance Date Due FOBT Q 1 YEAR AGE 50-75 2017 Influenza Age 5 to Adult 2018 PAP AKA CERVICAL CYTOLOGY 2019 BREAST CANCER SCRN MAMMOGRAM 2020 DTaP/Tdap/Td series (3 - Td) 2027 Allergies as of 2018  Review Complete On: 2018 By: Adrian Wesley Severity Noted Reaction Type Reactions Monistat 1 [Tioconazole]  10/24/2012    Swelling Prozac [Fluoxetine]  10/24/2016   Side Effect Other (comments) \"felt like having a heart attack\" Sulfa Dyne  10/24/2012    Nausea and Vomiting Current Immunizations  Reviewed on 2017 Name Date Influenza Vaccine 10/28/2016 TDAP Vaccine 10/24/2012  6:47 PM  
 Tdap 2017 Not reviewed this visit Vitals BP Pulse Resp Height(growth percentile) Weight(growth percentile) SpO2  
 124/80 73 18 5' 4\" (1.626 m) 175 lb (79.4 kg) 97% BMI OB Status Smoking Status 30.04 kg/m2 Hysterectomy Former Smoker BMI and BSA Data Body Mass Index Body Surface Area 30.04 kg/m 2 1.89 m 2 Preferred Pharmacy Pharmacy Name Phone [phone], [address] [phone] Your Updated Medication List  
  
   
This list is accurate as of 8/23/18  2:34 PM.  Always use your most recent med list.  
  
  
  
  
 aspirin-acetaminophen-caffeine 250-250-65 mg per tablet Commonly known as:  EXCEDRIN ES Take 1 Tab by mouth. baclofen 10 mg tablet Commonly known as:  LIORESAL  
take 1 tablet by mouth three times a day  
  
 citalopram 40 mg tablet Commonly known as:  CELEXA  
take 1 tablet by mouth once daily FISH OIL 1,000 mg Cap Generic drug:  omega-3 fatty acids-vitamin e Take 1 Cap by mouth.  
  
 levothyroxine 75 mcg tablet Commonly known as:  SYNTHROID  
take 1 tablet by mouth every morning ON AN EMPTY STOMACH  
  
 methylPREDNISolone 4 mg tablet Commonly known as:  Danella Chris Per dose pack instructions  
  
 montelukast 10 mg tablet Commonly known as:  SINGULAIR  
take 1 tablet by mouth once daily  
  
 niCARdipine 30 mg capsule Commonly known as:  CARDENE  
take 1 capsule by mouth three times a day * onabotulinumtoxinA 200 unit injection Commonly known as:  BOTOX INJECT 200 UNITS IM OVER 31 FDA APPROVED SITES AT HEAD AND NECK EVERY 12 WEEKS FOR MIGRAINE PREVENTION ( DISCARD UNUSED PORTION) * onabotulinumtoxinA 200 unit injection Commonly known as:  BOTOX Inject 155 units IM to 31 FDA approved sites face and neck every 12 weeks * onabotulinumtoxinA 200 unit injection Commonly known as:  BOTOX Inject 155 Units IM into 31 FDA approved sites to face and neck. pregabalin 100 mg capsule Commonly known as:  LYRICA  
take 1 capsule by mouth twice a day SUMAtriptan 100 mg tablet Commonly known as:  IMITREX take 1/2 to 1 tablet by mouth AT ONSET OF HEADCHE **REPEAT IN 2 HOURS IF NEEDED**  
  
 TROKENDI XR 50 mg capsule Generic drug:  topiramate ER  
take 1 capsule by mouth once daily TYLENOL 325 mg tablet Generic drug:  acetaminophen Take  by mouth every four (4) hours as needed for Pain. * Notice: This list has 3 medication(s) that are the same as other medications prescribed for you. Read the directions carefully, and ask your doctor or other care provider to review them with you. Patient Instructions OnabotulinumtoxinA (By injection) OnabotulinumtoxinA ([id number]-z-[id number]-mm-VZB-ccw-tox-in-ay) Treats muscle stiffness, muscle spasms, excessive sweating, overactive bladder, or loss of bladder control. Prevents chronic migraine headaches. Improves the appearance of wrinkles on the face. Brand Name(s): Botox, Botox Cosmetic There may be other brand names for this medicine. When This Medicine Should Not Be Used: This medicine is not right for everyone. You should not receive this medicine if you had an allergic reaction to onabotulinumtoxinA or any other botulinum toxin product. How to Use This Medicine:  
Injectable · Your doctor will prescribe your exact dose and tell you how often it should be given. This medicine is given by a healthcare provider as a shot under your skin or into a muscle. · You may be given medicine to numb the area where the shot will be injected. If you receive the medicine around your eyes, you may be given eye drops or ointment to numb the area. After your injection, you may need to wear a protective contact lens or eye patch. · If you are being treated for excessive sweating, shave your underarms but do not use deodorant for 24 hours before your injection. Avoid exercise, hot foods or liquids, or anything else that could make you sweat for 30 minutes before your injection. · The recommended treatment schedule for chronic migraine is every 12 weeks. · This medicine works slowly. Once your condition has improved, the medicine will last about 3 months, then the effects will slowly go away. You might need more injections to treat your condition. ¨ Muscle spasms in the eyelids should improve within 3 to 10 days. ¨ Eye muscle problems should improve 1 or 2 days after the injection, and the improvement should last for 2 to 6 weeks. ¨ Neck pain should improve within 2 to 6 weeks. ¨ Arm stiffness should improve within 4 to 6 weeks. ¨ Facial lines or wrinkles should improve 1 or 2 days. · This medicine should come with a Medication Guide. Ask your pharmacist for a copy if you do not have one. · Missed dose:Call your doctor or pharmacist for instructions. Drugs and Foods to Avoid: Ask your doctor or pharmacist before using any other medicine, including over-the-counter medicines, vitamins, and herbal products. · Some foods and medicine can affect how onabotulinumtoxinA works. Tell your doctor if you are using any of the following: ¨ Aspirin or a blood thinner (such as ticlopidine, warfarin) ¨ Muscle relaxer ¨ Medicine for an infection (such as amikacin, gentamicin, streptomycin, tobramycin) · Tell your doctor if you have received an injection of any botulinum toxin product within the past 4 months. Warnings While Using This Medicine: · Tell your doctor if you are pregnant or breastfeeding, or if you have breathing or lung problems, bleeding problems, heart or blood vessel disease, or nerve or muscle problems (such as myasthenia gravis). Tell your doctor if you have ever had face surgery or if you have a urinary tract infection or trouble urinating, diabetes, or multiple sclerosis. · This medicine may cause the following problems: ¨ Muscle weakness, loss of bladder control, trouble swallowing, speaking, or breathing (caused by the toxin spreading to other parts of your body) · This medicine may make your muscles weak or cause vision problems.  Do not drive or do anything else that could be dangerous until you know how this medicine affects you. · There are some warnings that only apply if you are receiving this medicine to treat the following: ¨ Injections near the eye: This medicine may reduce blinking, which can raise the risk of eye problems such as corneal exposure and ulcers. Tell your doctor right away if you notice that you are blinking less than usual or your eyes feel dry. ¨ Urinary incontinence: This medicine may cause autonomic dysreflexia, which can be a life-threatening condition. ¨ Overactive bladder: Check with your doctor right away if you have trouble urinating or a burning sensation while urinating. · This medicine contains products from donated human blood, so it may contain viruses, although the risk is low. Human donors and blood are always tested for viruses to keep the risk low. Talk with your doctor about this risk if you are concerned. · Your doctor will check your progress and the effects of this medicine at regular visits. Keep all appointments. Possible Side Effects While Using This Medicine:  
Call your doctor right away if you notice any of these side effects: · Allergic reaction: Itching or hives, swelling in your face or hands, swelling or tingling in your mouth or throat, chest tightness, trouble breathing · Blurred or double vision, droopy eyelids · Change in how much or how often you urinate, trouble urinating, or painful urination · Chest pain, slow or uneven heartbeat · Headache, increased sweating, warmth or redness in your face, neck, or arm · Muscle weakness · Trouble swallowing, talking, or breathing If you notice these less serious side effects, talk with your doctor: · Fever, chills, cough, stuffy or runny nose, sore throat, and body aches · Pain in your neck, back, arms, or legs · Redness, pain, tenderness, bruising, swelling, or weakness where the shot was given If you notice other side effects that you think are caused by this medicine, tell your doctor. Call your doctor for medical advice about side effects. You may report side effects to FDA at 3-539-LBI-8027 © 2017 [facility] Information is for End User's use only and may not be sold, redistributed or otherwise used for commercial purposes. The above information is an  only. It is not intended as medical advice for individual conditions or treatments. Talk to your doctor, nurse or pharmacist before following any medical regimen to see if it is safe and effective for you. Introducing [facility]! Dear Kal Shore: Thank you for requesting a [employer] account. Our records indicate that you have previously registered for a [employer] account but its currently inactive. Please call our [employer] support line at [phone]. Additional Information If you have questions, please visit the Frequently Asked Questions section of the [employer] website at [email or web address] Remember, jiglt is NOT to be used for urgent needs. For medical emergencies, dial 911. Now available from your iPhone and Android! Please provide this summary of care documentation to your next provider. Your primary care clinician is listed as Jg Brantley. If you have any questions after today's visit, please call [phone].

## 2018-08-27 ENCOUNTER — OFFICE VISIT (OUTPATIENT)
Dept: INTERNAL MEDICINE CLINIC | Age: 53
End: 2018-08-27

## 2018-08-27 VITALS
HEIGHT: 64 IN | HEART RATE: 68 BPM | SYSTOLIC BLOOD PRESSURE: 111 MMHG | OXYGEN SATURATION: 97 % | DIASTOLIC BLOOD PRESSURE: 73 MMHG | RESPIRATION RATE: 16 BRPM | BODY MASS INDEX: 29.53 KG/M2 | TEMPERATURE: 98 F | WEIGHT: 173 LBS

## 2018-08-27 DIAGNOSIS — Z91.89 AT RISK FOR DIABETES MELLITUS: ICD-10-CM

## 2018-08-27 DIAGNOSIS — E03.9 ACQUIRED HYPOTHYROIDISM: ICD-10-CM

## 2018-08-27 DIAGNOSIS — E55.9 VITAMIN D DEFICIENCY: ICD-10-CM

## 2018-08-27 DIAGNOSIS — E78.00 PURE HYPERCHOLESTEROLEMIA: ICD-10-CM

## 2018-08-27 DIAGNOSIS — I10 ESSENTIAL HYPERTENSION: ICD-10-CM

## 2018-08-27 DIAGNOSIS — M79.7 FIBROMYALGIA: ICD-10-CM

## 2018-08-27 DIAGNOSIS — Z00.00 ROUTINE MEDICAL EXAM: Primary | ICD-10-CM

## 2018-08-27 RX ORDER — PHENTERMINE HYDROCHLORIDE 15 MG/1
15 CAPSULE ORAL
COMMUNITY
End: 2019-01-23 | Stop reason: ALTCHOICE

## 2018-08-27 NOTE — PROGRESS NOTES
Enio Leon is a 48 y.o. female Is here for a health maintenance exam.   last seen in 2018     Weight in 2018 213#. Today, weight 173#. Is on appetite suppressant, phentermine. Per patient lowest dose. Is walking 3 days a week, feels well with exertion. Diet is better. More protein. Reduced carbs. Drinking water. More fruits and veggies.       She had colonoscopy 2017, was not completed,  Dr. Megan Bahena. Some constipation, but last week diarrhea. Usually every other day. Reports upper back pain today. Lower back is better. Will take NSAIDS. Has FM pain. On lyrica, celexa, baclofen one during day and 2 at night, sleep is variable. Anxiety and panic at times. Seeing marriage counselor. Problems with stepson. Treated for migraine, trokendi and imitrex. Prior botox.         Allergies   Allergen Reactions    Monistat 1 [Tioconazole] Swelling    Prozac [Fluoxetine] Other (comments)     \"felt like having a heart attack\"    Sulfa Dyne Nausea and Vomiting        Social History     Social History    Marital status:      Spouse name: N/A    Number of children: N/A    Years of education: N/A     Social History Main Topics    Smoking status: Former Smoker    Smokeless tobacco: Never Used    Alcohol use Yes      Comment: social    Drug use: No    Sexual activity: Not Currently     Partners: Male     Birth control/ protection: None     Other Topics Concern    None     Social History Narrative        Past Medical History:   Diagnosis Date    Anxiety     Constipation     Essential hypertension     Fibromyalgia     Headache     Headache(784.0)     Hypothyroidism     Joint pain     Obstructive sleep apnea (adult) (pediatric) 2014    Sinus problem     Sleep trouble     Stress     Tiredness         Past Surgical History:   Procedure Laterality Date    HX BLADDER SUSPENSION      HX GYN          HX GYN  2009    ablation    HX HYSTERECTOMY   CHRIS/BSO bleeding.  HX ORTHOPAEDIC      HX TUBAL LIGATION  1989       Family History   Problem Relation Age of Onset    Diabetes Father     Hypertension Father     Cancer Mother      skin    Cancer Maternal Aunt     Cancer Maternal Uncle     Diabetes Maternal Uncle     Diabetes Paternal Aunt     Diabetes Maternal Grandmother     Parkinsonism Maternal Grandmother     Other Paternal Grandmother      amylodosis        Current Outpatient Prescriptions   Medication Sig Dispense Refill    phentermine (ADIPEX_P) 15 mg capsule Take 15 mg by mouth every morning.  onabotulinumtoxinA (BOTOX) 200 unit injection Inject 155 units IM into 31 FDA approved sites to face and neck 1 Vial 0    pregabalin (LYRICA) 100 mg capsule take 1 capsule by mouth twice a day 60 Cap 5    montelukast (SINGULAIR) 10 mg tablet take 1 tablet by mouth once daily 30 Tab 5    citalopram (CELEXA) 40 mg tablet take 1 tablet by mouth once daily 30 Tab 4    niCARdipine (CARDENE) 30 mg capsule take 1 capsule by mouth three times a day 270 Cap 2    TROKENDI XR 50 mg capsule take 1 capsule by mouth once daily 30 Cap 5    SUMAtriptan (IMITREX) 100 mg tablet take 1/2 to 1 tablet by mouth AT ONSET OF HEADCHE **REPEAT IN 2 HOURS IF NEEDED** 9 Tab 3    onabotulinumtoxinA (BOTOX) 200 unit injection Inject 155 Units IM into 31 FDA approved sites to face and neck. 1 Vial 0    levothyroxine (SYNTHROID) 75 mcg tablet take 1 tablet by mouth every morning ON AN EMPTY STOMACH 30 Tab 3    onabotulinumtoxinA (BOTOX) 200 unit injection Inject 155 units IM to 31 FDA approved sites face and neck every 12 weeks 200 Units 0    aspirin-acetaminophen-caffeine (EXCEDRIN ES) 250-250-65 mg per tablet Take 1 Tab by mouth.  acetaminophen (TYLENOL) 325 mg tablet Take  by mouth every four (4) hours as needed for Pain.       onabotulinumtoxinA (BOTOX) 200 unit injection INJECT 200 UNITS IM OVER 31 FDA APPROVED SITES AT HEAD AND NECK EVERY 12 WEEKS FOR MIGRAINE PREVENTION ( DISCARD UNUSED PORTION) 1 Vial 3    baclofen (LIORESAL) 10 mg tablet take 1 tablet by mouth three times a day 90 Tab 11    omega-3 fatty acids-vitamin e (FISH OIL) 1,000 mg cap Take 1 Cap by mouth. ROS:  General: negative for fevers, chills, anorexia, weight loss  Eyes:   negative for visual disturbance, irritation  ENT:   negative for tinnitus,sore throat,nasal congestion,ear pain, sinus pain  Resp:   negative for cough, hemoptysis, dyspnea,wheezing  CV:   negative for chest pain, palpitations, lower extremity edema  GI:   negative for nausea, vomiting, diarrhea, abdominal pain,melena  Endo:               negative for polyuria,polydipsia,polyphagia,heat intolerance  :  negative for frequency, dysuria, hematuria, vaginal discharge  Skin:   negative for rash, pruritus  Heme:  negative for easy bruising, gum/nose bleeding  Musc:  negative for muscle weakness, joint pain, positive for back and neck pain  Neuro:  negative for dizziness, numbness, focal weakness, positive for headaches  Psych:  negative for feelings of anxiety, depression, mood changes      Blood pressure 111/73, pulse 68, temperature 98 °F (36.7 °C), temperature source Oral, resp. rate 16, height 5' 4\" (1.626 m), weight 173 lb (78.5 kg), SpO2 97 %. Body mass index is 29.7 kg/(m^2). General: Well, no acute distress  HEENT:   PERRL,normal conjunctiva. External ear and canals normal, TMs normal.  Hearing normal to voice. Nose without edema or discharge, with normal septum. Lips, teeth, gums normal.  Oropharynx: no erythema, no exudates, no lesions, normal tongue. NECK:  No carotid bruit. No masses or LAD  RESP:  No wheezing, no rhonchi, no rales. No chest wall tenderness. CV:  RRR, normal S1S2, no murmur. GI: soft, nontender, without masses. Extrem:  +2 pulses, no edema, warm distally  NEURO: alert, nonfocal  PSYCH: . Affect is alert and attentive. Assessment and Plan:      1.  Essential hypertension- Recommend following a lower sodium diet and stay active. Blood pressure goal is less than 130/85 on average. Advised compliance with blood pressure medication and regular follow up. - METABOLIC PANEL, COMPREHENSIVE  - CBC W/O DIFF  - URINALYSIS W/ RFLX MICROSCOPIC    2. Acquired hypothyroidism- Recommend taking thyroid medication daily on empty stomach and regular follow up.    - TSH 3RD GENERATION    3. Pure hypercholesterolemia- Recommend AHA diet. Continue statin therapy. 4. Routine medical exam- Recommend heart healthy diet and regular cardiovascular exercise. - METABOLIC PANEL, COMPREHENSIVE  - CBC W/O DIFF  - LIPID PANEL  - VITAMIN D, 25 HYDROXY  - URINALYSIS W/ RFLX MICROSCOPIC  - TSH 3RD GENERATION  - HEMOGLOBIN A1C W/O EAG    5. At risk for diabetes mellitus    - HEMOGLOBIN A1C W/O EAG    6. Vitamin D deficiency    - VITAMIN D, 25 HYDROXY    7. Fibromyalgia- continue cymbalta and lyrica. Follow-up Disposition:  Return for follow up pending labs and 6 months.  Sulema May MD

## 2018-08-27 NOTE — MR AVS SNAPSHOT
[address] [facility] 83. 
[phone] Patient: Sabina Silva MRN: [id number] :[id number] Visit Information Date & Time Provider Department Dept. Phone Encounter #  
 2018  9:00 AM James Willis, [address] [place] [address],[address] [phone] [id number] Follow-up Instructions Return for follow up pending labs and 6 months. .  
  
Your Appointments 11/15/2018  2:00 PM  
PROCEDURE with Michelle Childs MD  
[facility]) Appt Note: Botox [place]  [address] [place] [address] [phone]  
  
   
 [place]  [address] [address] [address] Upcoming Health Maintenance Date Due FOBT Q 1 YEAR AGE 50-75 2017 Influenza Age 5 to Adult 2018 PAP AKA CERVICAL CYTOLOGY 2019 BREAST CANCER SCRN MAMMOGRAM 2020 DTaP/Tdap/Td series (3 - Td) 2027 Allergies as of 2018  Review Complete On: 2018 By: James Willis MD  
  
 Severity Noted Reaction Type Reactions Monistat 1 [Tioconazole]  10/24/2012    Swelling Prozac [Fluoxetine]  10/24/2016   Side Effect Other (comments) \"felt like having a heart attack\" Sulfa Dyne  10/24/2012    Nausea and Vomiting Current Immunizations  Reviewed on 2017 Name Date Influenza Vaccine 10/28/2016 TDAP Vaccine 10/24/2012  6:47 PM  
 Tdap 2017 Not reviewed this visit You Were Diagnosed With   
  
 Codes Comments Routine medical exam    -  Primary ICD-10-CM: Z00.00 ICD-9-CM: V70.0 Essential hypertension     ICD-10-CM: I10 
ICD-9-CM: 401.9 Acquired hypothyroidism     ICD-10-CM: E03.9 ICD-9-CM: 244.9 Pure hypercholesterolemia     ICD-10-CM: E78.00 ICD-9-CM: 272.0 At risk for diabetes mellitus     ICD-10-CM: Z91.89 ICD-9-CM: V49.89   
 Vitamin D deficiency     ICD-10-CM: E55.9 ICD-9-CM: 268.9 Fibromyalgia     ICD-10-CM: M79.7 ICD-9-CM: 729.1 Vitals BP Pulse Temp Resp Height(growth percentile) Weight(growth percentile) 111/73 (BP 1 Location: Left arm, BP Patient Position: Sitting) 68 98 °F (36.7 °C) (Oral) 16 5' 4\" (1.626 m) 173 lb (78.5 kg) SpO2 BMI OB Status Smoking Status 97% 29.7 kg/m2 Hysterectomy Former Smoker Vitals History BMI and BSA Data Body Mass Index Body Surface Area  
 29.7 kg/m 2 1.88 m 2 Preferred Pharmacy Pharmacy Name Phone [id number], [address] [phone] Your Updated Medication List  
  
   
This list is accurate as of 8/27/18  9:39 AM.  Always use your most recent med list.  
  
  
  
  
 aspirin-acetaminophen-caffeine 250-250-65 mg per tablet Commonly known as:  EXCEDRIN ES Take 1 Tab by mouth. baclofen 10 mg tablet Commonly known as:  LIORESAL  
take 1 tablet by mouth three times a day  
  
 citalopram 40 mg tablet Commonly known as:  CELEXA  
take 1 tablet by mouth once daily FISH OIL 1,000 mg Cap Generic drug:  omega-3 fatty acids-vitamin e Take 1 Cap by mouth.  
  
 levothyroxine 75 mcg tablet Commonly known as:  SYNTHROID  
take 1 tablet by mouth every morning ON AN EMPTY STOMACH  
  
 montelukast 10 mg tablet Commonly known as:  SINGULAIR  
take 1 tablet by mouth once daily  
  
 niCARdipine 30 mg capsule Commonly known as:  CARDENE  
take 1 capsule by mouth three times a day * onabotulinumtoxinA 200 unit injection Commonly known as:  BOTOX INJECT 200 UNITS IM OVER 31 FDA APPROVED SITES AT HEAD AND NECK EVERY 12 WEEKS FOR MIGRAINE PREVENTION ( DISCARD UNUSED PORTION) * onabotulinumtoxinA 200 unit injection Commonly known as:  BOTOX Inject 155 units IM to 31 FDA approved sites face and neck every 12 weeks * onabotulinumtoxinA 200 unit injection Commonly known as:  BOTOX Inject 155 Units IM into 31 FDA approved sites to face and neck. * onabotulinumtoxinA 200 unit injection Commonly known as:  BOTOX Inject 155 units IM into 31 FDA approved sites to face and neck  
  
 phentermine 15 mg capsule Commonly known as:  ADIPEX_P Take 15 mg by mouth every morning. pregabalin 100 mg capsule Commonly known as:  LYRICA  
take 1 capsule by mouth twice a day SUMAtriptan 100 mg tablet Commonly known as:  IMITREX  
take 1/2 to 1 tablet by mouth AT ONSET OF HEADCHE **REPEAT IN 2 HOURS IF NEEDED**  
  
 TROKENDI XR 50 mg capsule Generic drug:  topiramate ER  
take 1 capsule by mouth once daily TYLENOL 325 mg tablet Generic drug:  acetaminophen Take  by mouth every four (4) hours as needed for Pain. * Notice: This list has 4 medication(s) that are the same as other medications prescribed for you. Read the directions carefully, and ask your doctor or other care provider to review them with you. We Performed the Following CBC W/O DIFF [62105 CPT(R)] HEMOGLOBIN A1C W/O EAG [52718 CPT(R)] LIPID PANEL [14290 CPT(R)] METABOLIC PANEL, COMPREHENSIVE [06761 CPT(R)] TSH 3RD GENERATION [74663 CPT(R)] URINALYSIS W/ RFLX MICROSCOPIC [69040 CPT(R)] VITAMIN D, 25 HYDROXY [id number] CPT(R)] Follow-up Instructions Return for follow up pending labs and 6 months. .  
  
  
Introducing [facility]! [employer] introduces [employer] patient portal. Now you can access parts of your medical record, email your doctor's office, and request medication refills online. 1. In your internet browser, go to [email or web address] 2. Click on the First Time User? Click Here link in the Sign In box. You will see the New Member Sign Up page. 3. Enter your [employer] Access Code exactly as it appears below. You will not need to use this code after youve completed the sign-up process.  If you do not sign up before the expiration date, you must request a new code. · MY[employer] Access Code: [email or web address] Expires: 11/21/2018  2:52 PM 
 
4. Enter the last four digits of your Social Security Number (xxxx) and Date of Birth (mm/dd/yyyy) as indicated and click Submit. You will be taken to the next sign-up page. 5. Create a MYTRND ID. This will be your MYTRND login ID and cannot be changed, so think of one that is secure and easy to remember. 6. Create a MYTRND password. You can change your password at any time. 7. Enter your Password Reset Question and Answer. This can be used at a later time if you forget your password. 8. Enter your e-mail address. You will receive e-mail notification when new information is available in [address]. 9. Click Sign Up. You can now view and download portions of your medical record. 10. Click the Download Summary menu link to download a portable copy of your medical information. If you have questions, please visit the Frequently Asked Questions section of the MY[employer] website. Remember, [employer] is NOT to be used for urgent needs. For medical emergencies, dial 911. Now available from your iPhone and Android! Please provide this summary of care documentation to your next provider. Your primary care clinician is listed as Jessica Dobson. If you have any questions after today's visit, please call [phone].

## 2018-08-27 NOTE — PROGRESS NOTES
Reviewed record in preparation for visit and have obtained necessary documentation. Identified pt with two pt identifiers(name and ). Chief Complaint   Patient presents with    Physical       Health Maintenance Due   Topic Date Due    Stool testing for trace blood  2017    Flu Vaccine  2018       Ms. Yarelis Patel has a reminder for a \"due or due soon\" health maintenance. I have asked that she discuss this further with her primary care provider for follow-up on this health maintenance. Coordination of Care Questionnaire:  :     1) Have you been to an emergency room, urgent care clinic since your last visit? no   Hospitalized since your last visit? no             2) Have you seen or consulted any other health care providers outside of [address] [place] [address] since your last visit? no  (Include any pap smears or colon screenings in this section.)    3) In the event something were to happen to you and you were unable to speak on your behalf, do you have an Advance Directive/ Living Will in place stating your wishes? YES    Do you have an Advance Directive on file? no    4) Are you interested in receiving information on Advance Directives? NO    Patient is accompanied by self I have received verbal consent from Mariola Sheridan to discuss any/all medical information while they are present in the room.

## 2018-08-28 LAB
25(OH)D3+25(OH)D2 SERPL-MCNC: 46.3 NG/ML (ref 30–100)
ALBUMIN SERPL-MCNC: 4.3 G/DL (ref 3.5–5.5)
ALBUMIN/GLOB SERPL: 1.7 {RATIO} (ref 1.2–2.2)
ALP SERPL-CCNC: 77 IU/L (ref 39–117)
ALT SERPL-CCNC: 15 IU/L (ref 0–32)
APPEARANCE UR: CLEAR
AST SERPL-CCNC: 19 IU/L (ref 0–40)
BACTERIA #/AREA URNS HPF: NORMAL /[HPF]
BILIRUB SERPL-MCNC: 0.3 MG/DL (ref 0–1.2)
BILIRUB UR QL STRIP: NEGATIVE
BUN SERPL-MCNC: 26 MG/DL (ref 6–24)
BUN/CREAT SERPL: 24 (ref 9–23)
CALCIUM SERPL-MCNC: 9.1 MG/DL (ref 8.7–10.2)
CASTS URNS QL MICRO: NORMAL /LPF
CHLORIDE SERPL-SCNC: 105 MMOL/L (ref 96–106)
CHOLEST SERPL-MCNC: 223 MG/DL (ref 100–199)
CO2 SERPL-SCNC: 22 MMOL/L (ref 20–29)
COLOR UR: YELLOW
CREAT SERPL-MCNC: 1.08 MG/DL (ref 0.57–1)
EPI CELLS #/AREA URNS HPF: NORMAL /HPF
ERYTHROCYTE [DISTWIDTH] IN BLOOD BY AUTOMATED COUNT: 12.7 % (ref 12.3–15.4)
GLOBULIN SER CALC-MCNC: 2.5 G/DL (ref 1.5–4.5)
GLUCOSE SERPL-MCNC: 90 MG/DL (ref 65–99)
GLUCOSE UR QL: NEGATIVE
HBA1C MFR BLD: 5.1 % (ref 4.8–5.6)
HCT VFR BLD AUTO: 40.1 % (ref 34–46.6)
HDLC SERPL-MCNC: 67 MG/DL
HGB BLD-MCNC: 13.4 G/DL (ref 11.1–15.9)
HGB UR QL STRIP: NEGATIVE
KETONES UR QL STRIP: NEGATIVE
LDLC SERPL CALC-MCNC: 143 MG/DL (ref 0–99)
LEUKOCYTE ESTERASE UR QL STRIP: ABNORMAL
MCH RBC QN AUTO: 31.8 PG (ref 26.6–33)
MCHC RBC AUTO-ENTMCNC: 33.4 G/DL (ref 31.5–35.7)
MCV RBC AUTO: 95 FL (ref 79–97)
MICRO URNS: ABNORMAL
MUCOUS THREADS URNS QL MICRO: PRESENT
NITRITE UR QL STRIP: NEGATIVE
PH UR STRIP: 6 [PH] (ref 5–7.5)
PLATELET # BLD AUTO: 249 X10E3/UL (ref 150–379)
POTASSIUM SERPL-SCNC: 4.1 MMOL/L (ref 3.5–5.2)
PROT SERPL-MCNC: 6.8 G/DL (ref 6–8.5)
PROT UR QL STRIP: NEGATIVE
RBC # BLD AUTO: 4.21 X10E6/UL (ref 3.77–5.28)
RBC #/AREA URNS HPF: NORMAL /HPF
SODIUM SERPL-SCNC: 143 MMOL/L (ref 134–144)
SP GR UR: 1.02 (ref 1–1.03)
TRIGL SERPL-MCNC: 64 MG/DL (ref 0–149)
TSH SERPL DL<=0.005 MIU/L-ACNC: 0.91 UIU/ML (ref 0.45–4.5)
UROBILINOGEN UR STRIP-MCNC: 0.2 MG/DL (ref 0.2–1)
VLDLC SERPL CALC-MCNC: 13 MG/DL (ref 5–40)
WBC # BLD AUTO: 4.7 X10E3/UL (ref 3.4–10.8)
WBC #/AREA URNS HPF: NORMAL /HPF

## 2018-09-10 RX ORDER — LEVOTHYROXINE SODIUM 75 UG/1
TABLET ORAL
Qty: 30 TAB | Refills: 3 | Status: SHIPPED | OUTPATIENT
Start: 2018-09-10 | End: 2019-01-11 | Stop reason: SDUPTHER

## 2018-09-26 DIAGNOSIS — G43.809 OTHER MIGRAINE WITHOUT STATUS MIGRAINOSUS, NOT INTRACTABLE: ICD-10-CM

## 2018-09-26 RX ORDER — SUMATRIPTAN 100 MG/1
TABLET, FILM COATED ORAL
Qty: 9 TAB | Refills: 3 | Status: SHIPPED | OUTPATIENT
Start: 2018-09-26 | End: 2019-01-16 | Stop reason: SDUPTHER

## 2018-11-05 ENCOUNTER — TELEPHONE (OUTPATIENT)
Dept: NEUROLOGY | Age: 53
End: 2018-11-05

## 2018-11-08 NOTE — TELEPHONE ENCOUNTER
Received one 200 unit botox vial from [facility]. Rx # [id number].  P # [id number]. 0 refills remaining

## 2018-11-09 DIAGNOSIS — M62.838 MUSCLE SPASM: ICD-10-CM

## 2018-11-11 RX ORDER — BACLOFEN 10 MG/1
TABLET ORAL
Qty: 90 TAB | Refills: 11 | Status: SHIPPED | OUTPATIENT
Start: 2018-11-11 | End: 2020-01-12 | Stop reason: SDUPTHER

## 2018-11-15 ENCOUNTER — OFFICE VISIT (OUTPATIENT)
Dept: NEUROLOGY | Age: 53
End: 2018-11-15

## 2018-11-15 VITALS
SYSTOLIC BLOOD PRESSURE: 122 MMHG | WEIGHT: 184 LBS | DIASTOLIC BLOOD PRESSURE: 78 MMHG | HEART RATE: 76 BPM | RESPIRATION RATE: 16 BRPM | BODY MASS INDEX: 31.41 KG/M2 | HEIGHT: 64 IN | OXYGEN SATURATION: 99 %

## 2018-11-15 NOTE — PATIENT INSTRUCTIONS
OnabotulinumtoxinA (By injection)   OnabotulinumtoxinA (mf-k-szw-qg-MSS-prb-tox-in-ay)  Treats muscle stiffness, muscle spasms, excessive sweating, overactive bladder, or loss of bladder control. Prevents chronic migraine headaches. Improves the appearance of wrinkles on the face. Brand Name(s): Botox, Botox Cosmetic   There may be other brand names for this medicine. When This Medicine Should Not Be Used: This medicine is not right for everyone. You should not receive this medicine if you had an allergic reaction to onabotulinumtoxinA or any other botulinum toxin product. How to Use This Medicine:   Injectable  · Your doctor will prescribe your exact dose and tell you how often it should be given. This medicine is given by a healthcare provider as a shot under your skin or into a muscle. · You may be given medicine to numb the area where the shot will be injected. If you receive the medicine around your eyes, you may be given eye drops or ointment to numb the area. After your injection, you may need to wear a protective contact lens or eye patch. · If you are being treated for excessive sweating, shave your underarms but do not use deodorant for 24 hours before your injection. Avoid exercise, hot foods or liquids, or anything else that could make you sweat for 30 minutes before your injection. · The recommended treatment schedule for chronic migraine is every 12 weeks. · This medicine works slowly. Once your condition has improved, the medicine will last about 3 months, then the effects will slowly go away. You might need more injections to treat your condition. ¨ Muscle spasms in the eyelids should improve within 3 to 10 days. ¨ Eye muscle problems should improve 1 or 2 days after the injection, and the improvement should last for 2 to 6 weeks. ¨ Neck pain should improve within 2 to 6 weeks. ¨ Arm stiffness should improve within 4 to 6 weeks.   ¨ Facial lines or wrinkles should improve 1 or 2 days.  · This medicine should come with a Medication Guide. Ask your pharmacist for a copy if you do not have one. · Missed dose:Call your doctor or pharmacist for instructions. Drugs and Foods to Avoid:   Ask your doctor or pharmacist before using any other medicine, including over-the-counter medicines, vitamins, and herbal products. · Some foods and medicine can affect how onabotulinumtoxinA works. Tell your doctor if you are using any of the following:  ¨ Aspirin or a blood thinner (such as ticlopidine, warfarin)  ¨ Muscle relaxer  ¨ Medicine for an infection (such as amikacin, gentamicin, streptomycin, tobramycin)  · Tell your doctor if you have received an injection of any botulinum toxin product within the past 4 months. Warnings While Using This Medicine:   · Tell your doctor if you are pregnant or breastfeeding, or if you have breathing or lung problems, bleeding problems, heart or blood vessel disease, or nerve or muscle problems (such as myasthenia gravis). Tell your doctor if you have ever had face surgery or if you have a urinary tract infection or trouble urinating, diabetes, or multiple sclerosis. · This medicine may cause the following problems:  ¨ Muscle weakness, loss of bladder control, trouble swallowing, speaking, or breathing (caused by the toxin spreading to other parts of your body)  · This medicine may make your muscles weak or cause vision problems. Do not drive or do anything else that could be dangerous until you know how this medicine affects you. · There are some warnings that only apply if you are receiving this medicine to treat the following:   ¨ Injections near the eye: This medicine may reduce blinking, which can raise the risk of eye problems such as corneal exposure and ulcers. Tell your doctor right away if you notice that you are blinking less than usual or your eyes feel dry. ¨ Urinary incontinence:  This medicine may cause autonomic dysreflexia, which can be a life-threatening condition. ¨ Overactive bladder: Check with your doctor right away if you have trouble urinating or a burning sensation while urinating. · This medicine contains products from donated human blood, so it may contain viruses, although the risk is low. Human donors and blood are always tested for viruses to keep the risk low. Talk with your doctor about this risk if you are concerned. · Your doctor will check your progress and the effects of this medicine at regular visits. Keep all appointments. Possible Side Effects While Using This Medicine:   Call your doctor right away if you notice any of these side effects:  · Allergic reaction: Itching or hives, swelling in your face or hands, swelling or tingling in your mouth or throat, chest tightness, trouble breathing  · Blurred or double vision, droopy eyelids  · Change in how much or how often you urinate, trouble urinating, or painful urination  · Chest pain, slow or uneven heartbeat  · Headache, increased sweating, warmth or redness in your face, neck, or arm  · Muscle weakness  · Trouble swallowing, talking, or breathing  If you notice these less serious side effects, talk with your doctor:   · Fever, chills, cough, stuffy or runny nose, sore throat, and body aches  · Pain in your neck, back, arms, or legs  · Redness, pain, tenderness, bruising, swelling, or weakness where the shot was given  If you notice other side effects that you think are caused by this medicine, tell your doctor. Call your doctor for medical advice about side effects. You may report side effects to FDA at 1-957-FDA-8490  © 2017 [address] Information is for End User's use only and may not be sold, redistributed or otherwise used for commercial purposes. The above information is an  only. It is not intended as medical advice for individual conditions or treatments.  Talk to your doctor, nurse or pharmacist before following any medical regimen to see if it is safe and effective for you.

## 2018-11-15 NOTE — PROGRESS NOTES
[facility]  OFFICE PROCEDURE PROGRESS NOTE        Chart reviewed for the following:   Jhon Paul MD, have reviewed the History, Physical and updated the Allergic reactions for [address] performed immediately prior to start of procedure:   I, Justus Hopkins MD, have performed the following reviews on Roney Cohen prior to the start of the procedure:            * Patient was identified by name and date of birth   * Agreement on procedure being performed was verified  * Risks and Benefits explained to the patient  * Procedure site verified and marked as necessary  * Patient was positioned for comfort  * Consent was signed and verified     Time: 2794      Date of procedure: 11/15/2018    Procedure performed by:  Justus Hopkins MD    Provider assisted by: None    Patient assisted by: None    How tolerated by patient: tolerated the procedure well with no complications    Post Procedural Pain Scale: 2 - Hurts Little Bit    Comments: None      Botox Injection Note       Indication: patient has chronic recurrent migraine, has 7-10 less migraine days per month with botox injections  Previous preventatives: She tried depakote, topamax, lyrica, SSRIs, nortriptyline, and nicardipine (Ca ch blocker). She is also on baclofen  Procedure:   Botox concentration: 200 units in 4 ml of preservative-free normal saline. 31 sites injections, distribution as follow      Units/site  Sites Sides Subtotal    Procerus 5 1 1 5    5 1 2 10   Frontalis 5 2 2 20   Temporalis 5 4 2 40   Occipitalis 5 3 2 30   Upper cervical paraspinalis 5 2 2 20   Trapezius 5 3 2 30         200 units Botox were reconstituted, 155 units injected as above and the remainder was unavoidably wasted.      Patient tolerated procedure well.     _____________________________   Marco A Angelo M.D.

## 2018-12-10 RX ORDER — TOPIRAMATE 50 MG/1
CAPSULE, EXTENDED RELEASE ORAL
Qty: 30 CAP | Refills: 5 | Status: SHIPPED | OUTPATIENT
Start: 2018-12-10 | End: 2019-06-10 | Stop reason: SDUPTHER

## 2018-12-10 RX ORDER — CITALOPRAM 40 MG/1
TABLET, FILM COATED ORAL
Qty: 30 TAB | Refills: 4 | Status: SHIPPED | OUTPATIENT
Start: 2018-12-10 | End: 2019-05-21 | Stop reason: SDUPTHER

## 2018-12-13 ENCOUNTER — TELEPHONE (OUTPATIENT)
Dept: NEUROLOGY | Age: 53
End: 2018-12-13

## 2018-12-13 NOTE — TELEPHONE ENCOUNTER
PA request for Giovanna Juárez was sent today and received approval. Faxed approval notice to pharmacy for review. Scanned to pt's chart.

## 2019-01-09 RX ORDER — ONABOTULINUMTOXINA 200 [USP'U]/1
INJECTION, POWDER, LYOPHILIZED, FOR SOLUTION INTRADERMAL; INTRAMUSCULAR
Qty: 1 VIAL | Refills: 3 | Status: SHIPPED | OUTPATIENT
Start: 2019-01-09 | End: 2019-01-23 | Stop reason: SDUPTHER

## 2019-01-11 ENCOUNTER — TELEPHONE (OUTPATIENT)
Dept: NEUROLOGY | Age: 54
End: 2019-01-11

## 2019-01-11 RX ORDER — LEVOTHYROXINE SODIUM 75 UG/1
TABLET ORAL
Qty: 30 TAB | Refills: 3 | Status: SHIPPED | OUTPATIENT
Start: 2019-01-11 | End: 2019-05-08 | Stop reason: SDUPTHER

## 2019-01-11 NOTE — TELEPHONE ENCOUNTER
----- Message from Ashok Bhat sent at 1/11/2019  4:03 PM EST -----  Regarding: /Telephone  Pt is requesting a call back in reference to being seen at the [facility] office to receive her Botox  Just this one time. Best contact number is [phone].

## 2019-01-14 NOTE — TELEPHONE ENCOUNTER
Called and spoke with patient and she wanted the appt. For her re-authorization to be done at [address] [place] [address], but still have Dr. Mary Taylor do the botox injections. I explained to the patient that she would be better to have a physician at [address] [place] [address] do both as Dr. Mary Taylor is leaving the practice and moving to [address] [place] [address]. Patient wanted Dr. Mary Taylor to know that she was happy for her, but said for herself and that she would schedule both with the [address] [place] [address] office. Patient was given the office number of the [address] [place] [address] location.

## 2019-01-15 ENCOUNTER — TELEPHONE (OUTPATIENT)
Dept: NEUROLOGY | Age: 54
End: 2019-01-15

## 2019-01-15 NOTE — TELEPHONE ENCOUNTER
----- Message from [place] sent at 1/15/2019 11:44 AM EST -----  Regarding: Dr. Jean-Pierre Siegel  Pt would like a call to schedule an appt for Botox injection . Pt is seen at the [facility] location, but would like to change locations. Best contact number [phone].

## 2019-01-16 DIAGNOSIS — G43.809 OTHER MIGRAINE WITHOUT STATUS MIGRAINOSUS, NOT INTRACTABLE: ICD-10-CM

## 2019-01-16 RX ORDER — SUMATRIPTAN 100 MG/1
TABLET, FILM COATED ORAL
Qty: 9 TAB | Refills: 3 | Status: SHIPPED | OUTPATIENT
Start: 2019-01-16 | End: 2019-05-12 | Stop reason: SDUPTHER

## 2019-01-17 ENCOUNTER — TELEPHONE (OUTPATIENT)
Dept: NEUROLOGY | Age: 54
End: 2019-01-17

## 2019-01-17 DIAGNOSIS — M62.838 MUSCLE SPASM: ICD-10-CM

## 2019-01-17 NOTE — TELEPHONE ENCOUNTER
----- Message from Sd Joseph sent at 1/17/2019  9:45 AM EST -----  Regarding: Ben/Telephone  Pt called requesting a call back from a missed call from aNti Pyle. Pt best contact number is [phone] .

## 2019-01-17 NOTE — TELEPHONE ENCOUNTER
I called the patient and her last appointment was 11/15/18 for botox and will be due in February.  Was previously done by Dr Hakan Bradley and can schedule with Dr Dahlia Fuentes when she calls back

## 2019-01-18 ENCOUNTER — TELEPHONE (OUTPATIENT)
Dept: INTERNAL MEDICINE CLINIC | Age: 54
End: 2019-01-18

## 2019-01-18 DIAGNOSIS — G43.109 MIGRAINE WITH AURA AND WITHOUT STATUS MIGRAINOSUS, NOT INTRACTABLE: Primary | ICD-10-CM

## 2019-01-18 NOTE — TELEPHONE ENCOUNTER
Regarding: Referral Request  Contact: [phone]  ----- Message from [address] [place] [address] [address], Generic sent at 1/18/2019  8:02 AM EST -----    Dr. Xenia Roman, has been my neurologist for many years and she is moving to [address] [place] [address]. I have been trying to schedule my appointment for my yearly Botox authorization this month and my quarterly Botox injection with their office in [place] for a week now. I can't seem to talk to anyone at the office, my calls go directly to a call center and then a message gets sent to the office and I got a call back 48 hours later, which I missed when I was in the rest room. Long story short, that's frustrating. Do you have any suggestions for a new neurologist for me to see ? Thanks! Have a great weekend!     Danitza Murphy

## 2019-01-18 NOTE — TELEPHONE ENCOUNTER
I called the patient and scheduled for botox on 3/1/19 with Dr Berry Magallanes and   Follow up with Kristen Gastelum on 1/23/19    Last botox was 11/15/18

## 2019-01-18 NOTE — TELEPHONE ENCOUNTER
This patient will be seeing you next week.  We can go over how to put together the paperwork to give to [facility]

## 2019-01-23 ENCOUNTER — OFFICE VISIT (OUTPATIENT)
Dept: NEUROLOGY | Age: 54
End: 2019-01-23

## 2019-01-23 VITALS
WEIGHT: 186 LBS | OXYGEN SATURATION: 98 % | BODY MASS INDEX: 31.76 KG/M2 | DIASTOLIC BLOOD PRESSURE: 56 MMHG | SYSTOLIC BLOOD PRESSURE: 106 MMHG | HEIGHT: 64 IN | HEART RATE: 62 BPM

## 2019-01-23 DIAGNOSIS — M62.838 TRAPEZIUS MUSCLE SPASM: ICD-10-CM

## 2019-01-23 DIAGNOSIS — G43.109 MIGRAINE WITH AURA AND WITHOUT STATUS MIGRAINOSUS, NOT INTRACTABLE: ICD-10-CM

## 2019-01-23 DIAGNOSIS — G44.221 CHRONIC TENSION-TYPE HEADACHE, INTRACTABLE: ICD-10-CM

## 2019-01-23 DIAGNOSIS — M79.7 FIBROMYALGIA: ICD-10-CM

## 2019-01-23 RX ORDER — BISMUTH SUBSALICYLATE 262 MG
1 TABLET,CHEWABLE ORAL DAILY
COMMUNITY

## 2019-01-23 RX ORDER — CITALOPRAM 40 MG/1
TABLET, FILM COATED ORAL
COMMUNITY
End: 2019-01-23 | Stop reason: SDUPTHER

## 2019-01-23 NOTE — PROGRESS NOTES
[facility] at [address] [place] [address], [facility] [place] [facility], [address] 
[phone] (O) [phone] (F)   
  
  2019 Name:  Hanh Daryb 
:  1965 MRN:  [id number] PCP:  Brad Colvin MD 
 
Chief Complaint Chief Complaint Patient presents with  Follow-up  Migraine Kimberly Simpson is a 48 y.o. female who presents for follow up of migraines. History of Present Illness Patient's last visit was on 11/15/18 for Botox. She has been recievineg Botox for several years, and overall it has significantly decreased her migraines. Prior to Botox she was having 1-2 migraines per week that lasted 1-2 days each, but now she has a migraine once a month that lasts less than 1 day. Her migraines resolve within 3-4 hours after she takes Imitrex and lies down. Her last office visit was on 11/15/2017. The patient states her Trokendi was increaed to 100 mg daily in  and she denies any side effects, but she did not feel the increased dose of Trokendi helped her headaches. About one month ago her dose of Trokendi was switched back to 50 mg daily due to the prior authorization for the 100 mg tablets was not completed. (Patient visit notes from 11/15/17 record patient did not tolerate dose higher than 50 mg daily of Trokendi.) Migraines:  
Headache Characterization: throbbing, sharp Pain Level: 8-9/10 Aura: sometimes see flashing lights and squiggly lines at onset of migraines for about 10 minutes Frequency/Length: one day per month, last less than one day (usually resolve within 3-4 hours after taking Imitrex). Location: around her eyes and spread to frontal area and over whole head Nausea/Vomiting: Nausea, but no vomiting Photophobia:  Yes Phonophobia: yes Provoking factors: flashing lights, smells, whether changes Relieving factors: Takes Imitrex and lies down in a dark room Focal neurologic symptoms with headache: She denies any numbness, tingling or weakness or vision changes other than her visual aura at onset of headaches. Meds: 
Prior abortive tx: Imitrex injections- needle would break Prior preventative tx: depakote, lyrica, SSRIs, nortriptyline, nicardipine Current abortive tx: imitrex pills Current preventative tx: Botox and Topamax Family Hx of headaches/migraines: aunt, cousins Social: 
Work: Patient is a  
Caffeine: very little Tobacco use: former smoker, quit 1994 Sleep habits: 7 hours per night Exercise: no  
 
Sees PCP yearly. Has her eyes checked yearly by her eye doctor. The patient reports she also has been getting daily \"tension headaches\" her whole life for as far back as she can remember. Her tension headaches start in her forehead and bilateral temporal area and spread to the back of her head. They are aching or throbbing and the pain is relieved when she presses on her head. She feels these headaches are not migraines due to she does not have any visual aura or experience any nausea, photophobia or phonophobia with them. She experiences frequent neck muscle tension and sits at a computer most of the day. She gets monthly massages which helps some. Her headaches usually start out as mild 1/10 at the beginning of the day, but worsens up to 7/10 by the end of her workday. In the past 2 months she has had more stress in her life. She has been taking baclofen 10 mg 3 times daily for years, but she is not sure why she is taking it. She does not know if it helps with her muscle tension/spasms, due to she has taken if for years and does not know if her symptoms would be worse off of it. She has been taking Excedrin once or twice per day, about 4 days/week for the past several months. She knows that she needs to decrease the amount of Excedrin she is taking.   She has been prescribed a short course of oral steroids in the past for analgesic overuse headache, but it only helped for a few weeks. She has tried physical therapy in the past for her neck and had dry needling in March 2018. She did not feel the dry needling helped the muscle tension in her neck or her headaches. She states her out-of-pocket cost for Trokdendi is about $350 per month, and her out-of-pocket cost for the Botox is $1100 per treatment. She has been seen by rheumatologist, Dr. Tristen Morales, who is treating her for fibromyalgia with Lyrica. Lyrica is also expensive for her, but she states gabapentin did not work well for her and Lyrica works very well. Current Outpatient Medications Medication Sig  SUMAtriptan (IMITREX) 100 mg tablet TAKE 1/2 TO 1 TABLET BY MOUTH AT ONSET OF HEADACHE - REPEAT IN 2 HOURS IF NEEDED  
 levothyroxine (SYNTHROID) 75 mcg tablet take 1 tablet by mouth every morning ON AN EMPTY STOMACH  
 TROKENDI XR 50 mg capsule TAKE 1 CAPSULE BY MOUTH ONCE DAILY  citalopram (CELEXA) 40 mg tablet take 1 tablet by mouth once daily  baclofen (LIORESAL) 10 mg tablet take 1 tablet by mouth three times a day  pregabalin (LYRICA) 100 mg capsule take 1 capsule by mouth twice a day  montelukast (SINGULAIR) 10 mg tablet take 1 tablet by mouth once daily  niCARdipine (CARDENE) 30 mg capsule take 1 capsule by mouth three times a day  onabotulinumtoxinA (BOTOX) 200 unit injection Inject 155 Units IM into 31 FDA approved sites to face and neck.  aspirin-acetaminophen-caffeine (EXCEDRIN ES) 250-250-65 mg per tablet Take 2 Tabs by mouth every eight (8) hours as needed.  omega-3 fatty acids-vitamin e (FISH OIL) 1,000 mg cap Take 1 Cap by mouth. No current facility-administered medications for this visit. Allergies Allergen Reactions  Monistat 1 [Tioconazole] Swelling  Onion Diarrhea  Prozac [Fluoxetine] Other (comments) \"felt like having a heart attack\"  Sulfa (Sulfonamide Antibiotics) Nausea Only  Sulfa Dyne Nausea and Vomiting Past Medical History:  
Diagnosis Date  Anxiety  Constipation  Essential hypertension  Fibromyalgia  Headache   
 Headache(784.0)  Hypothyroidism  Joint pain  Obstructive sleep apnea (adult) (pediatric) 2014  Sinus problem  Sleep trouble  Stress  Tiredness Past Surgical History:  
Procedure Laterality Date  HX BLADDER SUSPENSION   [address] [facility]   HX GYN  2009  
 ablation  HX HYSTERECTOMY  2011 CHRIS/BSO bleeding.  HX ORTHOPAEDIC    
 [address] Social History Socioeconomic History  Marital status:  Spouse name: Not on file  Number of children: Not on file  Years of education: Not on file  Highest education level: Not on file Social Needs  Financial resource strain: Not on file  Food insecurity - worry: Not on file  Food insecurity - inability: Not on file  Transportation needs - medical: Not on file  Transportation needs - non-medical: Not on file Occupational History  Not on file Tobacco Use  Smoking status: Former Smoker  Smokeless tobacco: Never Used Substance and Sexual Activity  Alcohol use: Yes Comment: social  
 Drug use: No  
 Sexual activity: Not Currently Partners: Male Birth control/protection: None Other Topics Concern  Not on file Social History Narrative  Not on file Family History Problem Relation Age of Onset  Diabetes Father  Hypertension Father  Cancer Mother   
     skin  Cancer Maternal Aunt  Cancer Maternal Uncle  Diabetes Maternal Uncle  Diabetes Paternal Aunt  Diabetes Maternal Grandmother  Parkinsonism Maternal Grandmother  Other Paternal Grandmother   
     amylodosis Review of Systems Review of Systems Constitutional: Negative for chills, fever, malaise/fatigue and weight loss. HENT: Positive for tinnitus (ringing in both ears for years). Negative for hearing loss. Eyes: Positive for photophobia ( with migraines only ). Negative for blurred vision, double vision and pain. Respiratory: Negative for cough and shortness of breath. Cardiovascular: Negative for chest pain and palpitations. Gastrointestinal: Negative for abdominal pain, constipation, diarrhea, nausea and vomiting. Genitourinary: Negative for dysuria and hematuria. Musculoskeletal: Positive for myalgias (Muscles aching in the posterior neck, she was told she has arthritis in her neck 10 years ago. Muscles aching in her back due to fibromyalgia.). Negative for back pain, falls, joint pain and neck pain. Skin: Negative for rash. Neurological: Positive for headaches. Negative for dizziness, tingling, tremors, sensory change, speech change, focal weakness, seizures, loss of consciousness and weakness. Endo/Heme/Allergies: Does not bruise/bleed easily. Psychiatric/Behavioral: Positive for depression (Her depression is controlled on current medication, she sees her PCP for this). Negative for hallucinations, memory loss, substance abuse and suicidal ideas. The patient is nervous/anxious ( Controlled on current medication). The patient does not have insomnia. Physical Exam:  
 
Visit Vitals /56 Pulse 62 Ht 5' 4\" (1.626 m) Wt 186 lb (84.4 kg) SpO2 98% BMI 31.93 kg/m²  
 
 
 
  
General Exam 
 
Patient is alert in NAD, appears well nourished and well groomed. HEENT- head is normocephalic, sclera clear,  nose and throat are clear Neck- supple, no carotid bruit, has palpable muscle spasm of trapezius muscles bilaterally at base of neck. Trapezius muscles are mildly tender to palpation bilaterally. Chest - CTA A/P/L, full breath sounds bilaterally Heart - RRR, no murmur Musculoskeletal- normal posture Extremities - warm and no edema Skin- no rashes or lesions Psychiatric- normal mood and bright affect 
  
Neurological Exam 
 
Alert and oriented to person, place and time. Speech is clear- no aphasia or dysarthria. Recent and remote memory appear intact. Cranial nerves II-XII- visual acuity and visual fields grossly intact,  Fundoscopic- no papilledema, PERRLA, EOMS intact, no nystagmus, no ptosis, facial sensation normal and masseter strength intact, no facial asymmetry, facial movements are symmetrical and normal strength, hearing grossly intact, palate rises symmetrically, sternocleidomastoid and trapezius strength 5/5 bilaterally, tongue midline. Motor System- strength 5/5 to upper and lower extremities bilaterally, normal muscle bulk and tone, no tremor. Sensation- intact to light touch and temperature throughout. Coordination - finger to nose accurate, MICHELLE intact, Romberg negative, no pronator drift. Reflexes- 2+ to upper and lower extremities bilaterally. Gait- normal and steady  
  
Assessment and Plan: ICD-10-CM ICD-9-CM 1. Chronic migraine G43.709 346.70 REFERRAL TO PHYSICAL THERAPY 2. Trapezius muscle spasm M62.838 728.85 REFERRAL TO PHYSICAL THERAPY 3. Migraine with aura and without status migrainosus, not intractable G43.109 346.00   
4. Fibromyalgia M79.7 729.1 5. Chronic tension-type headache, intractable G44.221 339.12 Discussed case with Josette Geller who assisted with formulating plan below. 1. Chronic migraine Advised patient to continue Botox injections every 12 weeks which has been very effective at preventing her migraines. The patient was given information about a Botox savings program which she can apply for on the [employer] web site which hopefully will help her considerably with her out of pocket cost of Botox. Also advised to continue Trokendi 50 mg daily.   The patient was given a discount coupon that she can use to offset the high co-pay cost of the Trokendi. Advised patient to call our office if her co-pay for Trokendi remains high and we will change her to regular Topamax. 2. Trapezius muscle spasm 
- REFERRAL TO PHYSICAL THERAPY Patient referred to [facility] for deep tissue ultrasound and TENS for neck muscle spasms and tension headaches. 3. Migraine with aura and without status migrainosus, not intractable Continue Imitrex as prescribed for abortive migraine therapy. 4. Fibromyalgia Continue follow-up with her rheumatologist for treatment. 5. Chronic tension-type headache, intractable The patient was advised to increase her dose of baclofen from 10 mg 3 times daily,  to 10 mg in the morning, 20 mg in the afternoon, and 10 mg at bedtime. The patient's midday dose was increased due to she reports that her muscle tension is mild in the morning but gradually increases during the day. 6.  Advised patient to follow-up in 3 months with . Garfield Pepper, [facility] This note will not be viewable in [address]

## 2019-01-23 NOTE — PATIENT INSTRUCTIONS
A Healthy Lifestyle: Care Instructions Your Care Instructions A healthy lifestyle can help you feel good, stay at a healthy weight, and have plenty of energy for both work and play. A healthy lifestyle is something you can share with your whole family. A healthy lifestyle also can lower your risk for serious health problems, such as high blood pressure, heart disease, and diabetes. You can follow a few steps listed below to improve your health and the health of your family. Follow-up care is a key part of your treatment and safety. Be sure to make and go to all appointments, and call your doctor if you are having problems. It's also a good idea to know your test results and keep a list of the medicines you take. How can you care for yourself at home? · Do not eat too much sugar, fat, or fast foods. You can still have dessert and treats now and then. The goal is moderation. · Start small to improve your eating habits. Pay attention to portion sizes, drink less juice and soda pop, and eat more fruits and vegetables. ? Eat a healthy amount of food. A 3-ounce serving of meat, for example, is about the size of a deck of cards. Fill the rest of your plate with vegetables and whole grains. ? Limit the amount of soda and sports drinks you have every day. Drink more water when you are thirsty. ? Eat at least 5 servings of fruits and vegetables every day. It may seem like a lot, but it is not hard to reach this goal. A serving or helping is 1 piece of fruit, 1 cup of vegetables, or 2 cups of leafy, raw vegetables. Have an apple or some carrot sticks as an afternoon snack instead of a candy bar. Try to have fruits and/or vegetables at every meal. 
· Make exercise part of your daily routine. You may want to start with simple activities, such as walking, bicycling, or slow swimming. Try to be active 30 to 60 minutes every day.  You do not need to do all 30 to 60 minutes all at once. For example, you can exercise 3 times a day for 10 or 20 minutes. Moderate exercise is safe for most people, but it is always a good idea to talk to your doctor before starting an exercise program. 
· Keep moving. Mikey Blotter the lawn, work in the garden, or [facility]. Take the stairs instead of the elevator at work. · If you smoke, quit. People who smoke have an increased risk for heart attack, stroke, cancer, and other lung illnesses. Quitting is hard, but there are ways to boost your chance of quitting tobacco for good. ? Use nicotine gum, patches, or lozenges. ? Ask your doctor about stop-smoking programs and medicines. ? Keep trying. In addition to reducing your risk of diseases in the future, you will notice some benefits soon after you stop using tobacco. If you have shortness of breath or asthma symptoms, they will likely get better within a few weeks after you quit. · Limit how much alcohol you drink. Moderate amounts of alcohol (up to 2 drinks a day for men, 1 drink a day for women) are okay. But drinking too much can lead to liver problems, high blood pressure, and other health problems. Family health If you have a family, there are many things you can do together to improve your health. · Eat meals together as a family as often as possible. · Eat healthy foods. This includes fruits, vegetables, lean meats and dairy, and whole grains. · Include your family in your fitness plan. Most people think of activities such as jogging or tennis as the way to fitness, but there are many ways you and your family can be more active. Anything that makes you breathe hard and gets your heart pumping is exercise. Here are some tips: 
? Walk to do errands or to take your child to school or the bus. 
? Go for a family bike ride after dinner instead of watching TV. Where can you learn more? Go to http://kunal-iggy.info/. Enter Y918 in the search box to learn more about \"A Healthy Lifestyle: Care Instructions. \" Current as of: September 11, 2018 Content Version: 11.9 © 6231-8047 [employer]. Care instructions adapted under license by [employer] (which disclaims liability or warranty for this information). If you have questions about a medical condition or this instruction, always ask your healthcare professional. [address] any warranty or liability for your use of this information. Office Policieso Phone calls/patient messages: Please allow up to 24 hours for someone in the office to contact you about your call or message. Be mindful your provider may be out of the office or your message may require further review. We encourage you to use [employer] for your messages as this is a faster, more efficient way to communicate with our office 
o Medication Refills: 
Prescription medications require up to 48 business hours to process. We encourage you to use [employer] for your refills. For controlled medications: Please allow up to 72 business hours to process. Certain medications may require you to  a written prescription at our office. NO narcotic/controlled medications will be prescribed after 4pm Monday through Friday or on weekends 
o Form/Paperwork Completion: We ask that you allow 7-14 business days. You may also download your forms to [employer] to have your doctor print off.

## 2019-01-30 ENCOUNTER — DOCUMENTATION ONLY (OUTPATIENT)
Dept: NEUROLOGY | Age: 54
End: 2019-01-30

## 2019-01-31 ENCOUNTER — DOCUMENTATION ONLY (OUTPATIENT)
Dept: NEUROLOGY | Age: 54
End: 2019-01-31

## 2019-02-08 RX ORDER — MONTELUKAST SODIUM 10 MG/1
TABLET ORAL
Qty: 30 TAB | Refills: 5 | Status: SHIPPED | OUTPATIENT
Start: 2019-02-08 | End: 2019-08-04 | Stop reason: SDUPTHER

## 2019-02-14 DIAGNOSIS — M79.7 FIBROMYALGIA: ICD-10-CM

## 2019-02-14 RX ORDER — PREGABALIN 100 MG/1
CAPSULE ORAL
Qty: 60 CAP | Refills: 5 | Status: SHIPPED | OUTPATIENT
Start: 2019-02-14 | End: 2019-05-20 | Stop reason: SDUPTHER

## 2019-02-21 ENCOUNTER — DOCUMENTATION ONLY (OUTPATIENT)
Dept: NEUROLOGY | Age: 54
End: 2019-02-21

## 2019-02-21 NOTE — PROGRESS NOTES
Patient has botox appointment 3/1/20. Authorization  last botox was with Dr Julio Cesar Branch.       Carlos Enrique do you have an update on patients PA

## 2019-02-22 ENCOUNTER — TELEPHONE (OUTPATIENT)
Dept: NEUROLOGY | Age: 54
End: 2019-02-22

## 2019-02-22 NOTE — TELEPHONE ENCOUNTER
Re: Botox     Mikaela Howard called pt and she wants to hold off on Botox - due to financial reasons. She was scheduled for 3/1/19 -     I will work the [employer]. In case she decides to proceed with it.     fyi to Mikaela Howard.

## 2019-03-27 ENCOUNTER — TELEPHONE (OUTPATIENT)
Dept: NEUROLOGY | Age: 54
End: 2019-03-27

## 2019-03-27 NOTE — TELEPHONE ENCOUNTER
Patient's next appt is a f/u on 4/23/19. Will hold starting a Botox P.A. Until after this appointment as pt had previously decided to hold off on the procedure.

## 2019-04-23 ENCOUNTER — OFFICE VISIT (OUTPATIENT)
Dept: NEUROLOGY | Age: 54
End: 2019-04-23

## 2019-04-23 VITALS
SYSTOLIC BLOOD PRESSURE: 104 MMHG | DIASTOLIC BLOOD PRESSURE: 62 MMHG | WEIGHT: 193 LBS | BODY MASS INDEX: 32.95 KG/M2 | OXYGEN SATURATION: 99 % | HEIGHT: 64 IN | HEART RATE: 63 BPM

## 2019-04-23 DIAGNOSIS — G43.719 CHRONIC MIGRAINE WITHOUT AURA, INTRACTABLE, WITHOUT STATUS MIGRAINOSUS: Primary | ICD-10-CM

## 2019-04-23 DIAGNOSIS — M79.7 FIBROMYALGIA: ICD-10-CM

## 2019-04-23 RX ORDER — KETOROLAC TROMETHAMINE 10 MG/1
10 TABLET, FILM COATED ORAL EVERY 6 HOURS
Qty: 20 TAB | Refills: 0 | Status: SHIPPED | OUTPATIENT
Start: 2019-04-23 | End: 2019-07-11

## 2019-04-23 NOTE — PROGRESS NOTES
NEUROLOGY CLINIC NOTE    Patient ID:  Brigida Goss  [id number]  89 y.o.  1965    Date of Consultation:  2019    Reason for Consultation:  Establish care for chronic headaches  Chief Complaint   Patient presents with    Follow-up     talk about migraines       History of Present Illness:     Patient Active Problem List    Diagnosis Date Noted    Severe obesity (BMI 35.0-39. 9) with comorbidity ([address] 2018    Hypersomnolence 2017    Pure hypercholesterolemia 2016    Anxiety and depression 2016    Fibromyalgia 2016    Migraine without status migrainosus, not intractable 2016    Essential hypertension 2016    Acquired hypothyroidism 2016    Obstructive sleep apnea (adult) (pediatric) 2014    Chronic migraine 2013     Past Medical History:   Diagnosis Date    Anxiety     Constipation     Essential hypertension     Fibromyalgia     Headache     Headache(784.0)     Hypothyroidism     Joint pain     Obstructive sleep apnea (adult) (pediatric) 2014    Sinus problem     Sleep trouble     Stress     Tiredness       Past Surgical History:   Procedure Laterality Date    HX BLADDER SUSPENSION      HX GYN          HX GYN      ablation    HX HYSTERECTOMY      CHRIS/BSO bleeding.  HX ORTHOPAEDIC      HX TUBAL LIGATION        Prior to Admission medications    Medication Sig Start Date End Date Taking? Authorizing Provider   onabotulinumtoxinA (BOTOX) 200 unit injection Inject 155 Units IM into 31 FDA approved sites to face and neck. 19  Yes Georgia Gray MD   pregabalin (LYRICA) 100 mg capsule take 1 capsule by mouth twice a day 19  Yes Ilda Montoya MD   montelukast (SINGULAIR) 10 mg tablet take 1 tablet by mouth once daily 19  Yes Saige Cornelius MD   multivitamin (ONE A DAY) tablet Take 1 Tab by mouth daily.    Yes Provider, Historical   Magnesium Glycinate 100 mg tab Take 100 mg by mouth daily. Yes Provider, Historical   SUMAtriptan (IMITREX) 100 mg tablet TAKE 1/2 TO 1 TABLET BY MOUTH AT ONSET OF HEADACHE - REPEAT IN 2 HOURS IF NEEDED 1/16/19  Yes Omar Leon MD   levothyroxine (SYNTHROID) 75 mcg tablet take 1 tablet by mouth every morning ON AN EMPTY STOMACH 1/11/19  Yes Omar Leon MD   TROKENDI XR 50 mg capsule TAKE 1 CAPSULE BY MOUTH ONCE DAILY 12/10/18  Yes Blanca Montoya MD   citalopram (CELEXA) 40 mg tablet take 1 tablet by mouth once daily 12/10/18  Yes Keyonna Swartz MD   baclofen (LIORESAL) 10 mg tablet take 1 tablet by mouth three times a day 11/11/18  Yes Blanca Montoya MD   niCARdipine (CARDENE) 30 mg capsule take 1 capsule by mouth three times a day 5/30/18  Yes Omar Leon MD   aspirin-acetaminophen-caffeine (EXCEDRIN ES) 397-177-83 mg per tablet Take 2 Tabs by mouth every eight (8) hours as needed. Yes Provider, Historical   omega-3 fatty acids-vitamin e (FISH OIL) 1,000 mg cap Take 1 Cap by mouth. Yes Provider, Historical     Allergies   Allergen Reactions    Monistat 1 [Tioconazole] Swelling    Onion Diarrhea    Prozac [Fluoxetine] Other (comments)     \"felt like having a heart attack\"    Sulfa (Sulfonamide Antibiotics) Nausea Only    Sulfa Dyne Nausea and Vomiting      Social History     Tobacco Use    Smoking status: Former Smoker    Smokeless tobacco: Never Used   Substance Use Topics    Alcohol use: Yes     Comment: social      Family History   Problem Relation Age of Onset    Diabetes Father     Hypertension Father     Cancer Mother         skin    Cancer Maternal Aunt     Cancer Maternal Uncle     Diabetes Maternal Uncle     Diabetes Paternal Aunt     Diabetes Maternal Grandmother     Parkinsonism Maternal Grandmother     Other Paternal Grandmother         amylodosis        Subjective:      Becky Dominguez is a 48 y.o.  [place] with history of chronic migraine headaches, fibromyalgia, anxiety, depression, HTN, EDDIE and thyroid disorder who is here to establish her care for her chronic headaches. Patient was previously seen by Dr. Aziza Walker and initially seen last 4/2/2013. Daily headaches which worsens as the day progresses. Worst at night.      Headache Characterization: throbbing/aching/pounding/sharp at different times  Pain Level: 10/10  Aura: scintillations  Frequency/Length: daily but then 2-3 days per week it is really severe  Location: behind right eye or all over head  Nausea/Vomiting: nausea  Photophobia: Y  Phonophobia: Y  Provoking factors: weather changes, perfume  Relieving factors: sleeping helps, various meds work for a while but then stop  Focal neurologic symptoms with headache:    Patient underwent first Botox treatment last 7/25/2013. She was then getting treatment every 3 months. Last treatment was 11/15/2018. Did not get the next treatment schedule for 2/2019 due to cost and high deductible. Per patient prior to Botox she was having daily headaches and severe headaches of >15 per month. With chemodenervation with Botox 155 units using the chronic migraine protocol, she was having less than 3 migraine headaches a month. She is also taking Trokendi XR 50 mg daily, Lyrica 100 mg BID (fibromyalgia) and Baclofen 10 mg TID. Benefit from the last Botox waned the past 3 weeks. Now having daily headaches again. Bifrontal and radiates to the back of the head  Steady, sharp and throbbing pain. At its worst it can be a 8-9/10. Associated with vision changes (squiggly lines and flashes of light), light sensitivity, nausea and dizziness  Sumatriptan 100 mg offers benefit for 1 hour and headaches recur. Sleep helps. Currently c/o 3/10 headache.     Medications previously tried for the headache: Ibuprofen, Advil, Aleve, Tylenol 3, Tylenol, Excedrin, Maxalt, Axert, Frova, Zomig, Relpax, Imitrex, Fiorinal, Fioricet, Percocet, Ultracet, Ultram, baclofen, Depakote, Neurontin, amitriptyline, Flonase, amitriptyline, Cymbalta, Prozac, Celexa, Zoloft, Effexor, Zyrtec, Medrol Dosepak, Celebrex, magnesium, coenzyme Q 10, Paxil, Wellbutrin, Topamax, Lyrica    Review of records reveal brain MRI with and without contrast done 3/30/2016 did not reveal any acute process. No abnormal enhancement. Mild white matter disease consistent with chronic microvascular ischemic changes. Head and neck MRA was normal.    Laboratory work-up done by PCP 8/27/2018 revealed unremarkable hemoglobin A1c, TSH, vitamin D, CMP and CBC. Elevated cholesterol and LDL. Outside reports reviewed: office notes, radiology reports, lab reports. Review of Systems:    A comprehensive review of systems was performed:   Positive for fatigue, weight gain, tinnitus, joint pain, muscle aches, depression, headaches      Objective:     Visit Vitals  /62   Pulse 63   Ht 5' 4\" (1.626 m)   Wt 193 lb (87.5 kg)   SpO2 99%   BMI 33.13 kg/m²       PHYSICAL EXAM:    NEUROLOGICAL EXAM:    Appearance: The patient is well developed, well nourished, provides a coherent history and is in no acute distress. Mental Status: Oriented to time, place and person. Fluent, no aphasia or dysarthria. Mood and affect appropriate. Cranial Nerves:   II - XII were intact. Motor:  5/5 strength. Normal bulk and tone. No pronator drift. Reflexes:   Deep tendon reflexes 2+/4 and symmetrical. Downgoing toes. Sensory:   Normal to cold and vibration. Gait:  Normal gait. No Romberg. Tremor:   No tremor noted. Cerebellar:  Intact FTN/MICHELLE/HTS. Imaging  Brain MRI with and without contrast, brain/neck MRA: reviewed    Labs Reviewed      Assessment:   Chronic migraine headache  Fibromyalgia    Plan:   Neurological examination is nonfocal.  No indication currently to do any other neuroimaging. Previous brain MRI with and without contrast was reviewed and only revealed mild white matter disease. No acute process or abnormal enhancements.   Head and neck MRA were normal.  Worse headaches consistent with migraines. Patient is failed multiple maintenance and abortive therapies. Patient responding very well to chemodenervation with Botox 155 units using the chronic migraine protocol every 3 months. Headaches that were daily and more than 15 migraine headaches a month was down to less than 3 migraine headaches a month. Continue Botox treatment. Prescription was ordered. Unfortunately due to cost patient was not able to receive her next treatment within a 3-month period. Ketorolac 10 mg every 6 hours x5 days was ordered to break the current cycle of her headaches. Prescriptions provided. Other potential treatment options are the newer medications targeting CGRP. For now continue Trokendi XR 50 mg daily. Potential future increase. Continue sumatriptan 100 mg for abortive therapy. Advised to taper baclofen and see if it is truly providing any benefit. Start with 10 mg twice daily x2 weeks and then 10 mg at bedtime x2 weeks and then off. Patient also carries a diagnosis of fibromyalgia and is responding well to Lyrica 100 mg twice daily. Continue regimen. Patient also advised to do routine structured exercises. All questions and concerns were answered. Visit lasted 45 minutes. Greater than 50% was spent reviewing her medical records as summarized above, discussion about her condition, etiology, prognosis, need to restart her Botox treatments using the chronic migraine protocol, discussion about weaning off baclofen and see if it is truly offering any benefit, potential increase of Trokendi, other treatment options, structured exercises, medications    This note was created using voice recognition software. Despite editing, there may be syntax errors.

## 2019-04-23 NOTE — PATIENT INSTRUCTIONS
Migraine Headache: Care Instructions  Your Care Instructions  Migraines are painful, throbbing headaches that often start on one side of the head. They may cause nausea and vomiting and make you sensitive to light, sound, or smell. Without treatment, migraines can last from 4 hours to a few days. Medicines can help prevent migraines or stop them after they have started. Your doctor can help you find which ones work best for you. Follow-up care is a key part of your treatment and safety. Be sure to make and go to all appointments, and call your doctor if you are having problems. It's also a good idea to know your test results and keep a list of the medicines you take. How can you care for yourself at home? · Do not drive if you have taken a prescription pain medicine. · Rest in a quiet, dark room until your headache is gone. Close your eyes, and try to relax or go to sleep. Don't watch TV or read. · Put a cold, moist cloth or cold pack on the painful area for 10 to 20 minutes at a time. Put a thin cloth between the cold pack and your skin. · Use a warm, moist towel or a heating pad set on low to relax tight shoulder and neck muscles. · Have someone gently massage your neck and shoulders. · Take your medicines exactly as prescribed. Call your doctor if you think you are having a problem with your medicine. You will get more details on the specific medicines your doctor prescribes. · Be careful not to take pain medicine more often than the instructions allow. You could get worse or more frequent headaches when the medicine wears off. To prevent migraines  · Keep a headache diary so you can figure out what triggers your headaches. Avoiding triggers may help you prevent headaches. Record when each headache began, how long it lasted, and what the pain was like.  (Was it throbbing, aching, stabbing, or dull?) Write down any other symptoms you had with the headache, such as nausea, flashing lights or dark spots, or sensitivity to bright light or loud noise. Note if the headache occurred near your period. List anything that might have triggered the headache. Triggers may include certain foods (chocolate, cheese, wine) or odors, smoke, bright light, stress, or lack of sleep. · If your doctor has prescribed medicine for your migraines, take it as directed. You may have medicine that you take only when you get a migraine and medicine that you take all the time to help prevent migraines. ? If your doctor has prescribed medicine for when you get a headache, take it at the first sign of a migraine, unless your doctor has given you other instructions. ? If your doctor has prescribed medicine to prevent migraines, take it exactly as prescribed. Call your doctor if you think you are having a problem with your medicine. · Find healthy ways to deal with stress. Migraines are most common during or right after stressful times. Take time to relax before and after you do something that has caused a migraine in the past.  · Try to keep your muscles relaxed by keeping good posture. Check your jaw, face, neck, and shoulder muscles for tension. Try to relax them. When you sit at a desk, change positions often. And make sure to stretch for 30 seconds each hour. · Get plenty of sleep and exercise. · Eat meals on a regular schedule. Avoid foods and drinks that often trigger migraines. These include chocolate, alcohol (especially red wine and port), aspartame, monosodium glutamate (MSG), and some additives found in foods (such as hot dogs, sanders, cold cuts, aged cheeses, and pickled foods). · Limit caffeine. Don't drink too much coffee, tea, or soda. But don't quit caffeine suddenly. That can also give you migraines. · Do not smoke or allow others to smoke around you. If you need help quitting, talk to your doctor about stop-smoking programs and medicines. These can increase your chances of quitting for good.   · If you are taking birth control pills or hormone therapy, talk to your doctor about whether they are triggering your migraines. When should you call for help? Call 911 anytime you think you may need emergency care. For example, call if:    · You have signs of a stroke. These may include:  ? Sudden numbness, paralysis, or weakness in your face, arm, or leg, especially on only one side of your body. ? Sudden vision changes. ? Sudden trouble speaking. ? Sudden confusion or trouble understanding simple statements. ? Sudden problems with walking or balance. ? A sudden, severe headache that is different from past headaches.    Call your doctor now or seek immediate medical care if:    · You have new or worse nausea and vomiting.     · You have a new or higher fever.     · Your headache gets much worse.    Watch closely for changes in your health, and be sure to contact your doctor if:    · You are not getting better after 2 days (48 hours). Where can you learn more? Go to http://kunal-iggy.info/. Enter K575 in the search box to learn more about \"Migraine Headache: Care Instructions. \"  Current as of: Sabina 3, 2018  Content Version: 11.9  © 9846-3334 [employer]. Care instructions adapted under license by [employer] (which disclaims liability or warranty for this information). If you have questions about a medical condition or this instruction, always ask your healthcare professional. [address] any warranty or liability for your use of this information. Fibromyalgia: Care Instructions  Your Care Instructions    Fibromyalgia is a painful condition that is not completely understood by medical experts. The cause of fibromyalgia is not known. It can make you feel tired and ache all over. It causes tender spots at specific points of the body that hurt only when you press on them. You may have trouble sleeping, as well as other symptoms.  These problems can upset your work and home life. Symptoms tend to come and go, although they may never go away completely. Fibromyalgia does not harm your muscles, joints, or organs. Follow-up care is a key part of your treatment and safety. Be sure to make and go to all appointments, and call your doctor if you are having problems. It's also a good idea to know your test results and keep a list of the medicines you take. How can you care for yourself at home? · Exercise often. Walk, swim, or bike to help with pain and sleep problems and to make you feel better. · Try to get a good night's sleep. Go to bed and get up at the same time each day, whether you feel rested or not. Make sure you have a good mattress and pillow. · Reduce stress. Avoid things that cause you stress, if you can. If not, work at making them less stressful. Learn to use biofeedback, guided imagery, meditation, or other methods to relax. · Make healthy changes. Eat a balanced diet, quit smoking, and limit alcohol and caffeine. · Use a heating pad set on low or take warm baths or showers for pain. Using cold packs for up to 20 minutes at a time can also relieve pain. Put a thin cloth between the cold pack and your skin. A gentle massage might help too. · Be safe with medicines. Take your medicines exactly as prescribed. Call your doctor if you think you are having a problem with your medicine. Your doctor may talk to you about taking antidepressant medicines. These medicines may improve sleep, relieve pain, and in some cases treat depression. · Learn about fibromyalgia. This makes coping easier. Then, take an active role in your treatment. · Think about joining a support group with others who have fibromyalgia to learn more and get support. When should you call for help?   Watch closely for changes in your health, and be sure to contact your doctor if:    · You feel sad, helpless, or hopeless; lose interest in things you used to enjoy; or have other symptoms of depression.     · Your fibromyalgia symptoms get worse. Where can you learn more? Go to http://kunal-iggy.info/. Enter V003 in the search box to learn more about \"Fibromyalgia: Care Instructions. \"  Current as of: Sabina 3, 2018  Content Version: 11.9  © 3801-4670 [employer]. Care instructions adapted under license by [employer] (which disclaims liability or warranty for this information). If you have questions about a medical condition or this instruction, always ask your healthcare professional. [address] any warranty or liability for your use of this information.

## 2019-04-26 ENCOUNTER — TELEPHONE (OUTPATIENT)
Dept: NEUROLOGY | Age: 54
End: 2019-04-26

## 2019-04-26 NOTE — TELEPHONE ENCOUNTER
Re: Botox     Botox is no longer covered under [employer] pharmacy benefit. Must submit through  - and noted to please include the Ayleen Ayala with the [id number] and to use a start date of 5/5/19. Status pending. Set up case  [id number]  ([address] (REQ CAT = HS) ALESSANDRA )    Member Number: [id number]  Member Name: Ayla Pack  Diagnosis Code: V25834 - CHRONIC MIGRAINE W/O AURA INTRACT W/O STAT MIGR  Action Code: A4 Pended  Reason: HIPAA #E8 - Requires Medical Review      Explanation: One or more services could not be automatically approved.     Requesting Provider: Name: Shanae Pimentel MD    Provider Id: [id number]  [place] [address] Professional: Shanae Pimentel MD   [facility] [address]   [address]  , [address]    Provider Id: [id number]  Service: Type: SURGICAL PROCEDURE REVIEW    Code: 49760 - CHEMODENERVATION OF MUSCLE(S); MUSCLE(S) INNERVATED BY FACIAL, TRIGEMINAL, CERVICAL SPINAL AND ACCESSORY NERVES, BILATERAL (EG, FOR CHRONIC MIGRAINE)  Certification Begin Date: 05/01/2019  Units Requested: 4  Unit Type: EACH      appt 5/24/19    Sent PA via [facility] to [facility] for  - reply:

## 2019-05-08 RX ORDER — LEVOTHYROXINE SODIUM 75 UG/1
TABLET ORAL
Qty: 30 TAB | Refills: 3 | Status: SHIPPED | OUTPATIENT
Start: 2019-05-08 | End: 2019-07-08 | Stop reason: SDUPTHER

## 2019-05-10 ENCOUNTER — TELEPHONE (OUTPATIENT)
Dept: NEUROLOGY | Age: 54
End: 2019-05-10

## 2019-05-10 NOTE — TELEPHONE ENCOUNTER
Botox    Approvals via [employer] for both  and [id number]    Cert [id number]      Date range 5/1/19  - 4/3/2020. [facility] identified online for her SPP. [phone]. Forward to nurse to set up shipment and to notify patient of process.

## 2019-05-10 NOTE — TELEPHONE ENCOUNTER
Spoke with pharmacist Tommie [facility] pharmacy called botox prescription

## 2019-05-12 DIAGNOSIS — G43.809 OTHER MIGRAINE WITHOUT STATUS MIGRAINOSUS, NOT INTRACTABLE: ICD-10-CM

## 2019-05-12 RX ORDER — SUMATRIPTAN 100 MG/1
TABLET, FILM COATED ORAL
Qty: 9 TAB | Refills: 3 | Status: SHIPPED | OUTPATIENT
Start: 2019-05-12 | End: 2019-05-24 | Stop reason: SDUPTHER

## 2019-05-20 DIAGNOSIS — M79.7 FIBROMYALGIA: ICD-10-CM

## 2019-05-20 DIAGNOSIS — I10 ESSENTIAL HYPERTENSION: ICD-10-CM

## 2019-05-20 RX ORDER — NICARDIPINE HYDROCHLORIDE 30 MG/1
30 CAPSULE ORAL 3 TIMES DAILY
Qty: 270 CAP | Refills: 2 | Status: SHIPPED | OUTPATIENT
Start: 2019-05-20 | End: 2020-04-27 | Stop reason: SDUPTHER

## 2019-05-21 ENCOUNTER — DOCUMENTATION ONLY (OUTPATIENT)
Dept: NEUROLOGY | Age: 54
End: 2019-05-21

## 2019-05-21 RX ORDER — PREGABALIN 100 MG/1
CAPSULE ORAL
Qty: 60 CAP | Refills: 5 | Status: SHIPPED | OUTPATIENT
Start: 2019-05-21 | End: 2020-02-26 | Stop reason: SDUPTHER

## 2019-05-21 RX ORDER — CITALOPRAM 40 MG/1
TABLET, FILM COATED ORAL
Qty: 30 TAB | Refills: 4 | Status: SHIPPED | OUTPATIENT
Start: 2019-05-21 | End: 2019-10-18 | Stop reason: SDUPTHER

## 2019-05-21 NOTE — TELEPHONE ENCOUNTER
Received a refill request for    Requested Prescriptions     Pending Prescriptions Disp Refills    pregabalin (LYRICA) 100 mg capsule 60 Cap 5     Sig: take 1 capsule by mouth twice a day       Future Appointments   Date Time Provider Angelique Rodriguez   5/24/2019  3:40 Abbey Eisenmenger., MD [address] [place] [address]   9/4/2019  9:30 AM Luzmaria Lowery MD [facility] Appointment My Department:  Visit date not found    . please review

## 2019-05-21 NOTE — PROGRESS NOTES
botox came in the office: 05/21/19  MFR: alice  LOT: [id number]  Exp: 11/2021  Appointment:05/24/19  Specialty pharmacy:[facility]

## 2019-05-24 ENCOUNTER — OFFICE VISIT (OUTPATIENT)
Dept: NEUROLOGY | Age: 54
End: 2019-05-24

## 2019-05-24 VITALS
HEIGHT: 64 IN | DIASTOLIC BLOOD PRESSURE: 70 MMHG | BODY MASS INDEX: 33.12 KG/M2 | SYSTOLIC BLOOD PRESSURE: 112 MMHG | WEIGHT: 194 LBS | OXYGEN SATURATION: 98 % | HEART RATE: 53 BPM

## 2019-05-24 DIAGNOSIS — G43.809 OTHER MIGRAINE WITHOUT STATUS MIGRAINOSUS, NOT INTRACTABLE: ICD-10-CM

## 2019-05-24 DIAGNOSIS — G43.719 CHRONIC MIGRAINE WITHOUT AURA, INTRACTABLE, WITHOUT STATUS MIGRAINOSUS: Primary | ICD-10-CM

## 2019-05-24 NOTE — PATIENT INSTRUCTIONS

## 2019-05-27 RX ORDER — SUMATRIPTAN 100 MG/1
TABLET, FILM COATED ORAL
Qty: 9 TAB | Refills: 0 | Status: SHIPPED | OUTPATIENT
Start: 2019-05-27 | End: 2019-07-05 | Stop reason: SDUPTHER

## 2019-06-24 RX ORDER — TOPIRAMATE 50 MG/1
CAPSULE, EXTENDED RELEASE ORAL
Qty: 30 CAP | Refills: 5 | Status: SHIPPED | OUTPATIENT
Start: 2019-06-24 | End: 2019-12-19 | Stop reason: SDUPTHER

## 2019-06-24 NOTE — TELEPHONE ENCOUNTER
[facility]    Requested Prescriptions     Pending Prescriptions Disp Refills    TROKENDI XR 50 mg capsule [Pharmacy Med Name: TROKENDI XR 50MG CAPSULES] 30 Cap 0     Sig: TAKE 1 CAPSULE BY MOUTH ONCE DAILY       Future Appointments   Date Time Provider Angelique Rodriguez   9/4/2019  9:30 AM MD Kati Johnson 87                         Last Appointment My Department:  Visit date not found    Please advise

## 2019-06-25 NOTE — PROGRESS NOTES
Botox Injection Note       Indication: Patient has chronic recurrent migraine, has 7-10 less migraine days per month with botox injections, previously failed depakote, topamax, lyrica, SSRIs, nortriptyline, and nicardipine (Ca ch blocker)    Procedure:   Botox concentration: 200 units in 4 ml of preservative-free normal saline. 31 sites injections, distribution as follow      Units/site  Sites Sides Subtotal    Procerus 5 1 1 5    5 1 2 10   Frontalis 5 2 2 20   Temporalis 5 4 2 40   Occipitalis 5 3 2 30   Upper cervical paraspinalis 5 2 2 20   Trapezius 5 3 2 30         200 units Botox were reconstituted, 155 units injected as above and the remainder was unavoidably wasted.      Patient tolerated procedure well.     _____________________________   Carrie Carreon M.D. 25-Jun-2019 21:08

## 2019-07-08 ENCOUNTER — TELEPHONE (OUTPATIENT)
Dept: INTERNAL MEDICINE CLINIC | Age: 54
End: 2019-07-08

## 2019-07-08 RX ORDER — LEVOTHYROXINE SODIUM 75 UG/1
TABLET ORAL
Qty: 30 TAB | Refills: 0 | Status: SHIPPED | OUTPATIENT
Start: 2019-07-08 | End: 2019-08-06 | Stop reason: SDUPTHER

## 2019-07-08 NOTE — TELEPHONE ENCOUNTER
Patient states she need a call back in reference to getting an Acute appt scheduled with Dr. Yates preferably for Shoulder pain. Please call.  Thank you

## 2019-07-11 ENCOUNTER — OFFICE VISIT (OUTPATIENT)
Dept: INTERNAL MEDICINE CLINIC | Age: 54
End: 2019-07-11

## 2019-07-11 VITALS
DIASTOLIC BLOOD PRESSURE: 72 MMHG | RESPIRATION RATE: 16 BRPM | HEART RATE: 70 BPM | OXYGEN SATURATION: 99 % | BODY MASS INDEX: 34.04 KG/M2 | HEIGHT: 64 IN | WEIGHT: 199.4 LBS | TEMPERATURE: 98 F | SYSTOLIC BLOOD PRESSURE: 112 MMHG

## 2019-07-11 DIAGNOSIS — E66.01 SEVERE OBESITY (BMI 35.0-39.9) WITH COMORBIDITY (HCC): ICD-10-CM

## 2019-07-11 DIAGNOSIS — Z12.39 SCREENING FOR BREAST CANCER: ICD-10-CM

## 2019-07-11 DIAGNOSIS — M75.82 TENDINITIS OF LEFT ROTATOR CUFF: Primary | ICD-10-CM

## 2019-07-11 RX ORDER — DICLOFENAC SODIUM 75 MG/1
75 TABLET, DELAYED RELEASE ORAL 2 TIMES DAILY
Qty: 30 TAB | Refills: 2 | Status: SHIPPED | OUTPATIENT
Start: 2019-07-11 | End: 2019-08-22 | Stop reason: SDUPTHER

## 2019-07-11 NOTE — PATIENT INSTRUCTIONS
Office Policies Phone calls/patient messages: Please allow up to 24 hours for someone in the office to contact you about your call or message. Be mindful your provider may be out of the office or your message may require further review. We encourage you to use [email or web address] for your messages as this is a faster, more efficient way to communicate with our office Medication Refills: 
         
Prescription medications require 48-72 business hours to process. We encourage you to use [email or web address] for your refills. For controlled medications: Please allow 72 business hours to process. Certain medications may require you to  a written prescription at our office. NO narcotic/controlled medications will be prescribed after 4pm Monday through Friday or on weekends Form/Paperwork Completion: 
         
Please note a $25 fee may incur for all paperwork for completed by our providers. We ask that you allow 7-10 business days. Pre-payment is due prior to picking up/faxing the completed form. You may also download your forms to [email or web address] to have your doctor print off. Rotator Cuff: Exercises Your Care Instructions Here are some examples of typical rehabilitation exercises for your condition. Start each exercise slowly. Ease off the exercise if you start to have pain. Your doctor or physical therapist will tell you when you can start these exercises and which ones will work best for you. How to do the exercises Pendulum swing 1. Hold on to a table or the back of a chair with your good arm. Then bend forward a little and let your sore arm hang straight down. This exercise does not use the arm muscles. Rather, use your legs and your hips to create movement that makes your arm swing freely. 2. Use the movement from your hips and legs to guide the slightly swinging arm back and forth like a pendulum (or elephant trunk).  Then guide it in circles that start small (about the size of a dinner plate). Make the circles a bit larger each day, as your pain allows. 3. Do this exercise for 5 minutes, 5 to 7 times each day. 4. As you have less pain, try bending over a little farther to do this exercise. This will increase the amount of movement at your shoulder. Posterior stretching exercise 1. Hold the elbow of your injured arm with your other hand. 2. Use your hand to pull your injured arm gently up and across your body. You will feel a gentle stretch across the back of your injured shoulder. 3. Hold for at least 15 to 30 seconds. Then slowly lower your arm. 4. Repeat 2 to 4 times. Up-the-back stretch 1. Put your hand in your back pocket. Let it rest there to stretch your shoulder. 2. With your other hand, hold your injured arm (palm outward) behind your back by the wrist. Pull your arm up gently to stretch your shoulder. 3. Next, put a towel over your other shoulder. Put the hand of your injured arm behind your back. Now hold the back end of the towel. With the other hand, hold the front end of the towel in front of your body. Pull gently on the front end of the towel. This will bring your hand farther up your back to stretch your shoulder. Overhead stretch 1. Standing about an arm's length away, grasp onto a solid surface. You could use a countertop, a doorknob, or the back of a sturdy chair. 2. With your knees slightly bent, bend forward with your arms straight. Lower your upper body, and let your shoulders stretch. 3. As your shoulders are able to stretch farther, you may need to take a step or two backward. 4. Hold for at least 15 to 30 seconds. Then stand up and relax. If you had stepped back during your stretch, step forward so you can keep your hands on the solid surface. 5. Repeat 2 to 4 times. Shoulder flexion (lying down) 1. Lie on your back, holding a wand with both hands.  Your palms should face down as you hold the wand. 2. Keeping your elbows straight, slowly raise your arms over your head. Raise them until you feel a stretch in your shoulders, upper back, and chest. 
3. Hold for 15 to 30 seconds. 4. Repeat 2 to 4 times. Shoulder rotation (lying down) 1. Lie on your back. Hold a wand with both hands with your elbows bent and palms up. 2. Keep your elbows close to your body, and move the wand across your body toward the sore arm. 3. Hold for 8 to 12 seconds. 4. Repeat 2 to 4 times. Wall climbing (to the side) 1. Stand with your side to a wall so that your fingers can just touch it at an angle about 30 degrees toward the front of your body. 2. Walk the fingers of your injured arm up the wall as high as pain permits. Try not to shrug your shoulder up toward your ear as you move your arm up. 3. Hold that position for a count of at least 15 to 20. 
4. Walk your fingers back down to the starting position. 5. Repeat at least 2 to 4 times. Try to reach higher each time. Wall climbing (to the front) 1. Face a wall, and stand so your fingers can just touch it. 2. Keeping your shoulder down, walk the fingers of your injured arm up the wall as high as pain permits. (Don't shrug your shoulder up toward your ear.) 3. Hold your arm in that position for at least 15 to 30 seconds. 4. Slowly walk your fingers back down to where you started. 5. Repeat at least 2 to 4 times. Try to reach higher each time. Shoulder blade squeeze 1. Stand with your arms at your sides, and squeeze your shoulder blades together. Do not raise your shoulders up as you squeeze. 2. Hold 6 seconds. 3. Repeat 8 to 12 times. Scapular exercise: Arm reach 1. Lie flat on your back. This exercise is a very slight motion that starts with your arms raised (elbows straight, arms straight). 2. From this position, reach higher toward the juli or ceiling.  Keep your elbows straight. All motion should be from your shoulder blade only. 3. Relax your arms back to where you started. 4. Repeat 8 to 12 times. Arm raise to the side 1. Slowly raise your injured arm to the side, with your thumb facing up. Raise your arm 60 degrees at the most (shoulder level is 90 degrees). 2. Hold the position for 3 to 5 seconds. Then lower your arm back to your side. If you need to, bring your \"good\" arm across your body and place it under the elbow as you lower your injured arm. Use your good arm to keep your injured arm from dropping down too fast. 
3. Repeat 8 to 12 times. 4. When you first start out, don't hold any extra weight in your hand. As you get stronger, you may use a 1-pound to 2-pound dumbbell or a small can of food. Shoulder flexor and extensor exercise 1. Push forward (flex): Stand facing a wall or doorjamb, about 6 inches or less back. Hold your injured arm against your body. Make a closed fist with your thumb on top. Then gently push your hand forward into the wall with about 25% to 50% of your strength. Don't let your body move backward as you push. Hold for about 6 seconds. Relax for a few seconds. Repeat 8 to 12 times. 2. Push backward (extend): Stand with your back flat against a wall. Your upper arm should be against the wall, with your elbow bent 90 degrees (your hand straight ahead). Push your elbow gently back against the wall with about 25% to 50% of your strength. Don't let your body move forward as you push. Hold for about 6 seconds. Relax for a few seconds. Repeat 8 to 12 times. Scapular exercise: Wall push-ups 1. Stand facing a wall, about 12 inches to 18 inches away. 2. Place your hands on the wall at shoulder height. 3. Slowly bend your elbows and bring your face to the wall. Keep your back and hips straight. 4. Push back to where you started. 5. Repeat 8 to 12 times.  
6. When you can do this exercise against a wall comfortably, you can try it against a counter. You can then slowly progress to the end of a couch, then to a sturdy chair, and finally to the floor. Scapular exercise: Retraction 1. Put the band around a solid object at about waist level. (A bedpost will work well.) Each hand should hold an end of the band. 2. With your elbows at your sides and bent to 90 degrees, pull the band back. Your shoulder blades should move toward each other. Then move your arms back where you started. 3. Repeat 8 to 12 times. 4. If you have good range of motion in your shoulders, try this exercise with your arms lifted out to the sides. Keep your elbows at a 90-degree angle. Raise the elastic band up to about shoulder level. Pull the band back to move your shoulder blades toward each other. Then move your arms back where you started. Internal rotator strengthening exercise 1. Start by tying a piece of elastic exercise material to a doorknob. You can use surgical tubing or Thera-Band. 2. Stand or sit with your shoulder relaxed and your elbow bent 90 degrees. Your upper arm should rest comfortably against your side. Squeeze a rolled towel between your elbow and your body for comfort. This will help keep your arm at your side. 3. Hold one end of the elastic band in the hand of the painful arm. 4. Slowly rotate your forearm toward your body until it touches your belly. Slowly move it back to where you started. 5. Keep your elbow and upper arm firmly tucked against the towel roll or at your side. 6. Repeat 8 to 12 times. External rotator strengthening exercise 1. Start by tying a piece of elastic exercise material to a doorknob. You can use surgical tubing or Thera-Band. (You may also hold one end of the band in each hand.) 2. Stand or sit with your shoulder relaxed and your elbow bent 90 degrees. Your upper arm should rest comfortably against your side. Squeeze a rolled towel between your elbow and your body for comfort.  This will help keep your arm at your side. 3. Hold one end of the elastic band with the hand of the painful arm. 4. Start with your forearm across your belly. Slowly rotate the forearm out away from your body. Keep your elbow and upper arm tucked against the towel roll or the side of your body until you begin to feel tightness in your shoulder. Slowly move your arm back to where you started. 5. Repeat 8 to 12 times. Follow-up care is a key part of your treatment and safety. Be sure to make and go to all appointments, and call your doctor if you are having problems. It's also a good idea to know your test results and keep a list of the medicines you take. Where can you learn more? Go to http://kunal-iggy.info/. Enter Bess Augustin in the search box to learn more about \"Rotator Cuff: Exercises. \" Current as of: September 20, 2018 Content Version: 11.9 © 4252-6742 [employer]. Care instructions adapted under license by [employer] (which disclaims liability or warranty for this information). If you have questions about a medical condition or this instruction, always ask your healthcare professional. [place] [address] any warranty or liability for your use of this information.

## 2019-07-11 NOTE — PROGRESS NOTES
Westley Parham is a 48 y.o. female who presents left anterior shoulder pain. Cannot reach behind back or overhead. Onset a few month. No neuro sx. Tried tylenol, advil 600mg.          Past Medical History:   Diagnosis Date    Anxiety     Constipation     Essential hypertension     Fibromyalgia     Headache     Headache(784.0)     Hypothyroidism     Joint pain     Obstructive sleep apnea (adult) (pediatric) 5/16/2014    Sinus problem     Sleep trouble     Stress     Tiredness        Family History   Problem Relation Age of Onset    Diabetes Father     Hypertension Father     Cancer Mother         skin    Cancer Maternal Aunt     Cancer Maternal Uncle     Diabetes Maternal Uncle     Diabetes Paternal Aunt     Diabetes Maternal Grandmother     Parkinsonism Maternal Grandmother     Other Paternal Grandmother         amylodosis       Social History     Socioeconomic History    Marital status:      Spouse name: Not on file    Number of children: Not on file    Years of education: Not on file    Highest education level: Not on file   Occupational History    Not on file   Social Needs    Financial resource strain: Not on file    Food insecurity:     Worry: Not on file     Inability: Not on file    Transportation needs:     Medical: Not on file     Non-medical: Not on file   Tobacco Use    Smoking status: Former Smoker    Smokeless tobacco: Never Used   Substance and Sexual Activity    Alcohol use: Yes     Comment: social    Drug use: No    Sexual activity: Not Currently     Partners: Male     Birth control/protection: None   Lifestyle    Physical activity:     Days per week: Not on file     Minutes per session: Not on file    Stress: Not on file   Relationships    Social connections:     Talks on phone: Not on file     Gets together: Not on file     Attends [employer] service: Not on file     Active member of club or organization: Not on file     Attends meetings of clubs or organizations: Not on file     Relationship status: Not on file    Intimate partner violence:     Fear of current or ex partner: Not on file     Emotionally abused: Not on file     Physically abused: Not on file     Forced sexual activity: Not on file   Other Topics Concern    Not on file   Social History Narrative    Not on file       Current Outpatient Medications on File Prior to Visit   Medication Sig Dispense Refill    levothyroxine (SYNTHROID) 75 mcg tablet TAKE 1 TABLET BY MOUTH EVERY MORNING ON AN EMPTY STOMACH 30 Tab 0    SUMAtriptan (IMITREX) 100 mg tablet TAKE 1/2 TO 1 TABLET BY MOUTH AT ONSET OF HEADACHE-- REPEAT IN 2 HOURS IF NEEDED 9 Tab 3    TROKENDI XR 50 mg capsule TAKE 1 CAPSULE BY MOUTH ONCE DAILY 30 Cap 5    pregabalin (LYRICA) 100 mg capsule take 1 capsule by mouth twice a day 60 Cap 5    citalopram (CELEXA) 40 mg tablet take 1 tablet by mouth once daily 30 Tab 4    niCARdipine (CARDENE) 30 mg capsule Take 1 Cap by mouth three (3) times daily. 270 Cap 2    onabotulinumtoxinA (BOTOX) 200 unit injection Inject 155 Units IM into 31 FDA approved sites to face and neck. 1 Vial 3    montelukast (SINGULAIR) 10 mg tablet take 1 tablet by mouth once daily 30 Tab 5    multivitamin (ONE A DAY) tablet Take 1 Tab by mouth daily.  baclofen (LIORESAL) 10 mg tablet take 1 tablet by mouth three times a day 90 Tab 11    aspirin-acetaminophen-caffeine (EXCEDRIN ES) 250-250-65 mg per tablet Take 2 Tabs by mouth every eight (8) hours as needed.  omega-3 fatty acids-vitamin e (FISH OIL) 1,000 mg cap Take 1 Cap by mouth. No current facility-administered medications on file prior to visit. Review of Systems  Pertinent items are noted in HPI.     Objective:     Visit Vitals  /72 (BP 1 Location: Right arm, BP Patient Position: Sitting)   Pulse 70   Temp 98 °F (36.7 °C) (Oral)   Resp 16   Ht 5' 4\" (1.626 m)   Wt 199 lb 6.4 oz (90.4 kg)   SpO2 99%   BMI 34.23 kg/m²     Gen: well appearing female  Resp:  No wheezing, no rhonchi, no rales. CV:  RRR, normal S1S2, no murmur. Extrem:  +2 pulses, no edema, warm distally, pain with rotation and abduction left shoulder. Assessment/Plan:       ICD-10-CM ICD-9-CM    1. Tendinitis of left rotator cuff M75.82 726.10 diclofenac EC (VOLTAREN) 75 mg EC tablet   2. Screening for breast cancer Z12.31 V76.10 [facility] MAMMO BI SCREENING INCL CAD   3. Severe obesity (BMI 35.0-39. 9) with comorbidity ([facility] [address] E66.01 278.01    ice, topicals, diclofenac and given exercises. Follow-up and Dispositions    · Return if symptoms worsen or fail to improve.          Lionel Epley, MD

## 2019-07-26 ENCOUNTER — HOSPITAL ENCOUNTER (OUTPATIENT)
Dept: MAMMOGRAPHY | Age: 54
Discharge: HOME | End: 2019-07-26
Attending: FAMILY MEDICINE
Payer: COMMERCIAL

## 2019-07-26 DIAGNOSIS — Z12.39 SCREENING FOR BREAST CANCER: ICD-10-CM

## 2019-07-26 PROCEDURE — 77067 SCR MAMMO BI INCL CAD: CPT

## 2019-07-29 ENCOUNTER — TELEPHONE (OUTPATIENT)
Dept: INTERNAL MEDICINE CLINIC | Age: 54
End: 2019-07-29

## 2019-07-29 DIAGNOSIS — M25.512 ACUTE PAIN OF LEFT SHOULDER: Primary | ICD-10-CM

## 2019-07-29 DIAGNOSIS — M25.512 LEFT SHOULDER PAIN, UNSPECIFIED CHRONICITY: Primary | ICD-10-CM

## 2019-07-29 RX ORDER — ETODOLAC 400 MG/1
400 TABLET, FILM COATED ORAL 2 TIMES DAILY
Qty: 60 TAB | Refills: 1 | Status: SHIPPED | OUTPATIENT
Start: 2019-07-29 | End: 2019-09-04

## 2019-07-29 NOTE — TELEPHONE ENCOUNTER
MD Zion Stewart LPN   Caller: Unspecified (Today, 11:58 AM)             Advise patient left shoulder xray ordered.  The xray may or may not reveal the cause of her pain.  We have to do this prior to any other testing, like MRI.  I can send in a different anti-inflammatory medication if she wants to try it, instead of diclofenac, since it is not helping.       Spoke with patient. Two pt identifiers confirmed. Patient advised of the above message from Dr. Tanya Delgado. Patient states that she would like to also try a different anti-inflammatory. Advised patient that I will make Dr. Tanya Delgado aware so that she can send something different to her pharmacy. Pt verbalized understanding of information discussed w/ no further questions at this time.

## 2019-07-29 NOTE — TELEPHONE ENCOUNTER
Jeffrey, [address] [facility]             General Message/Vendor Calls     Caller's first and last name:       Reason for call:       Callback required yes/no and why: yes       Best contact number(s): [phone]       Details to clarify the request: Patient is still experiencing shoulder pain please call and advise.        Brian Session       Message received & copied from [facility]

## 2019-07-29 NOTE — TELEPHONE ENCOUNTER
Spoke with patient. Two pt identifiers confirmed. Patient states that the pain in her left shoulder has gotten worse. Patient states that she has a sharp dull pain that is there all the time. Patient states that the pain is worse with any movement. Patient states that the diclofenac that she was prescribed did not seem to help at all. Patient states that she has been trying some OTC biofreeze and lidocaine as well as using ice and heat. Patient states that these things give her temporary relief, but only for a very short amount of time. Advised patient that I will send a message to Dr. Carlyn Severance and will call her back as soon as I can with her recommendations. Pt verbalized understanding of information discussed w/ no further questions at this time.

## 2019-07-30 NOTE — TELEPHONE ENCOUNTER
Spoke with patient. Two pt identifiers confirmed. Patient advised per Dr. Aleena Penaloza that her shoulder xray was normal.  Patient advised that Dr. Aleena Penaloza has ordered an MRI, but if this is denied by her insurance, she will refer her to Ortho. Pt verbalized understanding of information discussed w/ no further questions at this time.

## 2019-08-02 ENCOUNTER — DOCUMENTATION ONLY (OUTPATIENT)
Dept: NEUROLOGY | Age: 54
End: 2019-08-02

## 2019-08-02 NOTE — PROGRESS NOTES
Spoke with Lashawn Suarez at [address] [facility] and shipment should arrive on Thursday August 8,2019.

## 2019-08-04 RX ORDER — MONTELUKAST SODIUM 10 MG/1
TABLET ORAL
Qty: 30 TAB | Refills: 0 | Status: SHIPPED | OUTPATIENT
Start: 2019-08-04 | End: 2019-09-01 | Stop reason: SDUPTHER

## 2019-08-06 ENCOUNTER — TELEPHONE (OUTPATIENT)
Dept: INTERNAL MEDICINE CLINIC | Age: 54
End: 2019-08-06

## 2019-08-06 DIAGNOSIS — M25.512 ACUTE PAIN OF LEFT SHOULDER: Primary | ICD-10-CM

## 2019-08-06 DIAGNOSIS — F41.9 ANXIETY: Primary | ICD-10-CM

## 2019-08-06 RX ORDER — ALPRAZOLAM 0.5 MG/1
TABLET ORAL
Qty: 10 TAB | Refills: 0 | OUTPATIENT
Start: 2019-08-06 | End: 2019-09-04 | Stop reason: SDUPTHER

## 2019-08-06 RX ORDER — LEVOTHYROXINE SODIUM 75 UG/1
TABLET ORAL
Qty: 30 TAB | Refills: 0 | Status: SHIPPED | OUTPATIENT
Start: 2019-08-06 | End: 2019-10-04 | Stop reason: SDUPTHER

## 2019-08-06 NOTE — TELEPHONE ENCOUNTER
Pt is in need of a peer to peer for authorization.  Emily from [facility] said this can be complete with [employer] and the Pt insurance ID is the reference number.  (case will close on August 8th MRI is schedule without contrast August 9th is taking place at [facility] ) the phone umber for [employer] is [phone]       Message received & copied from [facility]

## 2019-08-06 NOTE — TELEPHONE ENCOUNTER
Yolanda, [address] [place] [address]             General Message/Vendor Calls       Caller's first and last name:   Mayela Ba     Reason for call:       Callback required yes and why: Jonathan Steinberg is attempting to reach office regarding her MRI on Friday and is requesting any prescription that will help with the feeling of claustrophobic feeling while in MRI machine       Best contact number(s): [phone]       Details to clarify the request: Patient is attempting to reach office regarding her MRI on Friday and is requesting any prescription that will help with the feeling of claustrophobic feeling while in MRI machine       Message received & copied from [facility] [[email or web address]] : Healthcare maintenance: Patient was provided with a prescription for one density which will be due in December.

## 2019-08-06 NOTE — TELEPHONE ENCOUNTER
Moshe Saenz MD 38 minutes ago (2:16 PM)         Please call in xanax and advise patient to take one about 30 minutes before MRI. Will need a  if the medication makes her sleepy. She can try it at home first to see how it affects her. Left message for patient with the above information from Dr. Ahsan Mike.

## 2019-08-06 NOTE — TELEPHONE ENCOUNTER
Please call in xanax and advise patient to take one about 30 minutes before MRI. Will need a  if the medication makes her sleepy. She can try it at home first to see how it affects her.

## 2019-08-07 NOTE — TELEPHONE ENCOUNTER
MD Mira Samaniego LPN   Caller: Unspecified Regina Ha,  1:33 PM)             Advise patient MRI not covered by insurance. Will need to see orthopedic specialist and if he feels it is indicated, he may be able to get it covered. Cancel test on Friday. Referral to Dr. Laveta Hatchet. [employer] message sent to patient with the above message from Dr. Alihsa Kay regarding her MRI.

## 2019-08-20 ENCOUNTER — OFFICE VISIT (OUTPATIENT)
Dept: NEUROLOGY | Age: 54
End: 2019-08-20

## 2019-08-20 VITALS
HEART RATE: 59 BPM | WEIGHT: 200 LBS | BODY MASS INDEX: 34.15 KG/M2 | HEIGHT: 64 IN | SYSTOLIC BLOOD PRESSURE: 116 MMHG | DIASTOLIC BLOOD PRESSURE: 72 MMHG | OXYGEN SATURATION: 96 %

## 2019-08-20 DIAGNOSIS — G43.719 CHRONIC MIGRAINE WITHOUT AURA, INTRACTABLE, WITHOUT STATUS MIGRAINOSUS: Primary | ICD-10-CM

## 2019-08-20 NOTE — PROCEDURES
[facility]   CHEMODENERVATION PROCEDURE NOTE        Chart reviewed for the following:   Ayla Baldwin MD, have reviewed the History, Physical and updated the Allergic reactions for [address] performed immediately prior to start of procedure:   I, Clara Bond MD, have performed the following reviews on Alejandro Gale prior to the start of the procedure:            * Patient was identified by name and date of birth   * Agreement on procedure being performed was verified  * Risks and Benefits explained to the patient  * Procedure site verified and marked as necessary  * Patient was positioned for comfort  * Consent was signed and verified     Time: 1440      Date of procedure: 8/20/2019    Procedure performed by:  Clara Bond MD    Provider assisted by: None    Patient assisted by: None    How tolerated by patient: tolerated the procedure well with no complications    Post Procedural Pain Scale: 0    Comments: Topical anesthetic applied to the face      Botox Injection Note       Indication: Patient has chronic recurrent migraine, no severe migraine headaches since the last treatment. Exacerbation of her headache from ongoing treatment of her left frozen shoulder last month. Since then she has been having increase frequency of mild to moderate headaches. Previous preventatives: She tried depakote, topamax, lyrica, SSRIs, nortriptyline, and nicardipine (Ca ch blocker). She is also on baclofen    Procedure:   Botox concentration: 200 units in 4 ml of preservative-free normal saline. 31 sites injections, distribution as follow      Units/site  Sites Sides Subtotal    Procerus 5 1 1 5    5 1 2 10   Frontalis 5 2 2 20   Temporalis 5 4 2 40   Occipitalis 5 3 2 30   Upper cervical paraspinalis 5 2 2 20   Trapezius 5 3 2 30     Lot. No.  C3  Exp.  Date 02/2022      200 units Botox were reconstituted, 155 units injected as above with 45 units of wastage. Patient tolerated procedure well.

## 2019-08-22 DIAGNOSIS — M75.82 TENDINITIS OF LEFT ROTATOR CUFF: ICD-10-CM

## 2019-08-22 RX ORDER — DICLOFENAC SODIUM 75 MG/1
TABLET, DELAYED RELEASE ORAL
Qty: 30 TAB | Refills: 0 | Status: SHIPPED | OUTPATIENT
Start: 2019-08-22 | End: 2019-09-04 | Stop reason: SDUPTHER

## 2019-09-04 ENCOUNTER — OFFICE VISIT (OUTPATIENT)
Dept: INTERNAL MEDICINE CLINIC | Age: 54
End: 2019-09-04

## 2019-09-04 VITALS
RESPIRATION RATE: 18 BRPM | HEIGHT: 64 IN | TEMPERATURE: 97.9 F | WEIGHT: 200.4 LBS | OXYGEN SATURATION: 98 % | HEART RATE: 56 BPM | SYSTOLIC BLOOD PRESSURE: 103 MMHG | BODY MASS INDEX: 34.21 KG/M2 | DIASTOLIC BLOOD PRESSURE: 68 MMHG

## 2019-09-04 DIAGNOSIS — Z00.00 ROUTINE MEDICAL EXAM: Primary | ICD-10-CM

## 2019-09-04 DIAGNOSIS — M79.7 FIBROMYALGIA: ICD-10-CM

## 2019-09-04 DIAGNOSIS — F41.9 ANXIETY: ICD-10-CM

## 2019-09-04 DIAGNOSIS — I10 ESSENTIAL HYPERTENSION: ICD-10-CM

## 2019-09-04 DIAGNOSIS — Z12.72 SCREENING FOR VAGINAL CANCER: ICD-10-CM

## 2019-09-04 DIAGNOSIS — M75.82 TENDINITIS OF LEFT ROTATOR CUFF: ICD-10-CM

## 2019-09-04 DIAGNOSIS — E78.00 PURE HYPERCHOLESTEROLEMIA: ICD-10-CM

## 2019-09-04 DIAGNOSIS — E03.9 ACQUIRED HYPOTHYROIDISM: ICD-10-CM

## 2019-09-04 DIAGNOSIS — E66.9 CLASS 1 OBESITY WITHOUT SERIOUS COMORBIDITY WITH BODY MASS INDEX (BMI) OF 34.0 TO 34.9 IN ADULT, UNSPECIFIED OBESITY TYPE: ICD-10-CM

## 2019-09-04 RX ORDER — DICLOFENAC SODIUM 75 MG/1
TABLET, DELAYED RELEASE ORAL
Qty: 30 TAB | Refills: 0 | Status: SHIPPED | OUTPATIENT
Start: 2019-09-04 | End: 2019-09-04

## 2019-09-04 RX ORDER — ALPRAZOLAM 0.5 MG/1
TABLET ORAL
Qty: 30 TAB | Refills: 0 | Status: SHIPPED | OUTPATIENT
Start: 2019-09-04 | End: 2019-11-19 | Stop reason: SDUPTHER

## 2019-09-04 RX ORDER — PHENTERMINE HYDROCHLORIDE 30 MG/1
30 CAPSULE ORAL
Qty: 30 CAP | Refills: 2 | Status: SHIPPED | OUTPATIENT
Start: 2019-09-04 | End: 2020-01-12 | Stop reason: SDUPTHER

## 2019-09-04 NOTE — PROGRESS NOTES
Anila Jhaveri is a 48 y.o. female who is here for an annual physical with pap. Last seen in July with left shoulder pain. Still bothersome. Saw lakeisha Boyce. Has frozen shoulder. Did not get an injection. Is in PT for the past 4 weeks. Is doing exercises at home. Not getting better. Tried lodine, voltaren. Neither helped. In for pap today, postmenopausal. S/p CHRIS/BSO. Some vaginal dryness. No discharge. Mammogram July 26, normal.     Weight increased from 173# in August 2018. Today 200#.   prior phentermine, off now. Tolerated in the past.   Per patient lowest dose. working on increasing walking with . Trying to watch diet.      She had colonoscopy August 2017, was not completed,  Dr. Janny Redd.      Has FM pain. On lyrica, celexa, baclofen. FM pain is under fair control. The shoulder pain is main complaint. Anxiety is under fair control. Not in counseling right now. Coping ok.      Treated for migraine, trokendi and imitrex. Prior botox.       Allergies   Allergen Reactions    Monistat 1 [Tioconazole] Swelling    Onion Diarrhea    Prozac [Fluoxetine] Other (comments)     \"felt like having a heart attack\"    Sulfa (Sulfonamide Antibiotics) Nausea Only    Sulfa Dyne Nausea and Vomiting        Social History     Socioeconomic History    Marital status:      Spouse name: Not on file    Number of children: Not on file    Years of education: Not on file    Highest education level: Not on file   Tobacco Use    Smoking status: Former Smoker    Smokeless tobacco: Never Used   Substance and Sexual Activity    Alcohol use: Yes     Comment: social    Drug use: No    Sexual activity: Yes     Partners: Male     Birth control/protection: None        Past Medical History:   Diagnosis Date    Anxiety     Constipation     Essential hypertension     Fibromyalgia     Headache     Headache(784.0)     Hypothyroidism     Joint pain     Obstructive sleep apnea (adult) (pediatric) 2014    Sinus problem     Sleep trouble     Stress     Tiredness         Past Surgical History:   Procedure Laterality Date    HX BLADDER SUSPENSION      HX GYN  1988        HX GYN  2009    ablation    HX HYSTERECTOMY      CHRIS/BSO bleeding.  HX ORTHOPAEDIC      HX TUBAL LIGATION         Family History   Problem Relation Age of Onset    Diabetes Father     Hypertension Father     Cancer Mother         skin    Cancer Maternal Aunt     Cancer Maternal Uncle     Diabetes Maternal Uncle     Diabetes Paternal Aunt     Diabetes Maternal Grandmother     Parkinsonism Maternal Grandmother     Other Paternal Grandmother         amylodosis        Current Outpatient Medications   Medication Sig Dispense Refill    ALPRAZolam (XANAX) 0.5 mg tablet Take one tablet twice a day as needed for anxiety 30 Tab 0    phentermine 30 mg capsule Take 1 Cap by mouth every morning. Max Daily Amount: 30 mg. 30 Cap 2    montelukast (SINGULAIR) 10 mg tablet TAKE 1 TABLET BY MOUTH ONCE DAILY 30 Tab 3    levothyroxine (SYNTHROID) 75 mcg tablet TAKE 1 TABLET BY MOUTH EVERY MORNING ON AN EMPTY STOMACH 30 Tab 0    SUMAtriptan (IMITREX) 100 mg tablet TAKE 1/2 TO 1 TABLET BY MOUTH AT ONSET OF HEADACHE-- REPEAT IN 2 HOURS IF NEEDED 9 Tab 3    TROKENDI XR 50 mg capsule TAKE 1 CAPSULE BY MOUTH ONCE DAILY 30 Cap 5    pregabalin (LYRICA) 100 mg capsule take 1 capsule by mouth twice a day 60 Cap 5    citalopram (CELEXA) 40 mg tablet take 1 tablet by mouth once daily 30 Tab 4    niCARdipine (CARDENE) 30 mg capsule Take 1 Cap by mouth three (3) times daily. 270 Cap 2    onabotulinumtoxinA (BOTOX) 200 unit injection Inject 155 Units IM into 31 FDA approved sites to face and neck. 1 Vial 3    multivitamin (ONE A DAY) tablet Take 1 Tab by mouth daily.       baclofen (LIORESAL) 10 mg tablet take 1 tablet by mouth three times a day 90 Tab 11    aspirin-acetaminophen-caffeine (EXCEDRIN ES) 250-250-65 mg per tablet Take 2 Tabs by mouth every eight (8) hours as needed.  omega-3 fatty acids-vitamin e (FISH OIL) 1,000 mg cap Take 1 Cap by mouth. ROS:  General: negative for fevers, chills, anorexia, weight loss  Eyes:   negative for visual disturbance, irritation  ENT:   negative for tinnitus,sore throat,nasal congestion,ear pains. hoarseness  Resp:   negative for cough, hemoptysis, dyspnea,wheezing  CV:   negative for chest pain, palpitations, lower extremity edema  GI:   negative for nausea, vomiting, diarrhea, abdominal pain,melena  Endo:              negative for polyuria,polydipsia,polyphagia,heat intolerance  :  negative for frequency, dysuria, hematuria, no vaginal discharge  Skin:   negative for rash, pruritus  Heme:  negative for easy bruising, gum/nose bleeding  Musc:  negative for myalgias, arthralgias, back pain, muscle weakness, joint pain  Neuro:  negative for headaches, dizziness, numbness, focal weakness  Psych:  negative for feelings of anxiety, depression, mood changes      Blood pressure 103/68, pulse (!) 56, temperature 97.9 °F (36.6 °C), temperature source Oral, resp. rate 18, height 5' 4\" (1.626 m), weight 200 lb 6.4 oz (90.9 kg), SpO2 98 %. Body mass index is 34.4 kg/m². General: Well, no acute distress  HEENT:   PERRL,normal conjunctiva. External ear and canals normal, TMs normal.  Hearing normal to voice. Nose without edema or discharge, with normal septum. Lips, teeth, gums normal.  Oropharynx: no erythema, no exudates, no lesions, normal tongue. Neck:  Supple. Thyroid normal size, nontender, without nodules. No carotid bruit. No masses or LAD  Resp:  No wheezing, no rhonchi, no rales. No chest wall tenderness. CV:  RRR, normal S1S2, no murmur. GI: soft, nontender, without masses. No hepatosplenomegaly. Extrem:  +2 pulses, no edema, warm distally  Neuro: alert, nonfocal  Psych: Vy Hoyles Affect is alert and attentive.   Breasts: no axillary LAD, normal nipples without discharge, no masses  Pelvic: normal external genitalia, speculum with normal vaginal mucosa, no abnormal discharge or lesions, prior CHRIS, mild vaginal atrophy,  Vaginal pap done, no adnexal tenderness or masses      Assessment and Plan:        ICD-10-CM ICD-9-CM    1. Routine medical exam X23.57 H26.3 METABOLIC PANEL, COMPREHENSIVE      CBC W/O DIFF      HEMOGLOBIN A1C W/O EAG      LIPID PANEL      TSH 3RD GENERATION      URINALYSIS W/ RFLX MICROSCOPIC   2. Essential hypertension N31 783.5 METABOLIC PANEL, COMPREHENSIVE      CBC W/O DIFF   3. Acquired hypothyroidism E03.9 244.9 TSH 3RD GENERATION   4. Pure hypercholesterolemia D42.25 911.8 METABOLIC PANEL, COMPREHENSIVE      LIPID PANEL   5. Fibromyalgia M79.7 729.1    6. Anxiety F41.9 300.00 ALPRAZolam (XANAX) 0.5 mg tablet   7. Class 1 obesity without serious comorbidity with body mass index (BMI) of 34.0 to 34.9 in adult, unspecified obesity type E66.9 278.00 phentermine 30 mg capsule    Z68.34 V85.34    8. Screening for vaginal cancer Z12.72 V76.47 PAP (IMAGE GUIDED) W/REFL HPV ALL PATHOLOGY ([id number])     Recommend heart healthy diet and regular cardiovascular exercise. Follow-up and Dispositions    · Return for follow up pending labs and 6 months on weight loss efforts if desired. Hannah Chowdhury MD          Discussed the patient's BMI with her. The BMI follow up plan is as follows:     dietary management education, guidance, and counseling  encourage exercise  monitor weight  prescribed dietary intake    An After Visit Summary was printed and given to the patient.

## 2019-09-04 NOTE — PATIENT INSTRUCTIONS
Office Policies    Phone calls/patient messages:            Please allow up to 24 hours for someone in the office to contact you about your call or message. Be mindful your provider may be out of the office or your message may require further review. We encourage you to use [employer] for your messages as this is a faster, more efficient way to communicate with our office                         Medication Refills:            Prescription medications require 48-72 business hours to process. We encourage you to use [employer] for your refills. For controlled medications: Please allow 72 business hours to process. Certain medications may require you to  a written prescription at our office. NO narcotic/controlled medications will be prescribed after 4pm Monday through Friday or on weekends              Form/Paperwork Completion:            Please note a $25 fee may incur for all paperwork for completed by our providers. We ask that you allow 7-10 business days. Pre-payment is due prior to picking up/faxing the completed form. You may also download your forms to [employer] to have your doctor print off. Body Mass Index: Care Instructions  Your Care Instructions    Body mass index (BMI) can help you see if your weight is raising your risk for health problems. It uses a formula to compare how much you weigh with how tall you are. · A BMI lower than 18.5 is considered underweight. · A BMI between 18.5 and 24.9 is considered healthy. · A BMI between 25 and 29.9 is considered overweight. A BMI of 30 or higher is considered obese. If your BMI is in the normal range, it means that you have a lower risk for weight-related health problems. If your BMI is in the overweight or obese range, you may be at increased risk for weight-related health problems, such as high blood pressure, heart disease, stroke, arthritis or joint pain, and diabetes.  If your BMI is in the underweight range, you may be at increased risk for health problems such as fatigue, lower protection (immunity) against illness, muscle loss, bone loss, hair loss, and hormone problems. BMI is just one measure of your risk for weight-related health problems. You may be at higher risk for health problems if you are not active, you eat an unhealthy diet, or you drink too much alcohol or use tobacco products. Follow-up care is a key part of your treatment and safety. Be sure to make and go to all appointments, and call your doctor if you are having problems. It's also a good idea to know your test results and keep a list of the medicines you take. How can you care for yourself at home? · Practice healthy eating habits. This includes eating plenty of fruits, vegetables, whole grains, lean protein, and low-fat dairy. · If your doctor recommends it, get more exercise. Walking is a good choice. Bit by bit, increase the amount you walk every day. Try for at least 30 minutes on most days of the week. · Do not smoke. Smoking can increase your risk for health problems. If you need help quitting, talk to your doctor about stop-smoking programs and medicines. These can increase your chances of quitting for good. · Limit alcohol to 2 drinks a day for men and 1 drink a day for women. Too much alcohol can cause health problems. If you have a BMI higher than 25  · Your doctor may do other tests to check your risk for weight-related health problems. This may include measuring the distance around your waist. A waist measurement of more than 40 inches in men or 35 inches in women can increase the risk of weight-related health problems. · Talk with your doctor about steps you can take to stay healthy or improve your health. You may need to make lifestyle changes to lose weight and stay healthy, such as changing your diet and getting regular exercise.   If you have a BMI lower than 18.5  · Your doctor may do other tests to check your risk for health problems. · Talk with your doctor about steps you can take to stay healthy or improve your health. You may need to make lifestyle changes to gain or maintain weight and stay healthy, such as getting more healthy foods in your diet and doing exercises to build muscle. Where can you learn more? Go to http://kunal-iggy.info/. Enter S176 in the search box to learn more about \"Body Mass Index: Care Instructions. \"  Current as of: October 13, 2016  Content Version: 11.4  © 4349-0825 [employer]. Care instructions adapted under license by [employer] (which disclaims liability or warranty for this information). If you have questions about a medical condition or this instruction, always ask your healthcare professional. [address] any warranty or liability for your use of this information.

## 2019-09-04 NOTE — PROGRESS NOTES
Reviewed record in preparation for visit and have obtained necessary documentation. Identified pt with two pt identifiers(name and ). Chief Complaint   Patient presents with    Medication Evaluation    Shoulder Pain     States left shoulder achy 5/10 pain x 4 months     Well Woman       Health Maintenance Due   Topic Date Due    Shingles Vaccine (1 of 2) 2017    Stool testing for trace blood  2017    Flu Vaccine  2019       Ms. Lucia Adorno has a reminder for a \"due or due soon\" health maintenance. I have asked that she discuss this further with her primary care provider for follow-up on this health maintenance. Coordination of Care Questionnaire:  :     1) Have you been to an emergency room, urgent care clinic since your last visit? no   Hospitalized since your last visit? no             2) Have you seen or consulted any other health care providers outside of [address] [place] [address] since your last visit? no  (Include any pap smears or colon screenings in this section.)    3) In the event something were to happen to you and you were unable to speak on your behalf, do you have an Advance Directive/ Living Will in place stating your wishes? NO    Do you have an Advance Directive on file? no    4) Are you interested in receiving information on Advance Directives? NO    Patient is accompanied by self I have received verbal consent from Tl Mistry to discuss any/all medical information while they are present in the room.

## 2019-09-05 ENCOUNTER — TELEPHONE (OUTPATIENT)
Dept: NEUROLOGY | Age: 54
End: 2019-09-05

## 2019-09-05 LAB
ALBUMIN SERPL-MCNC: 4.5 G/DL (ref 3.5–5.5)
ALBUMIN/GLOB SERPL: 1.9 {RATIO} (ref 1.2–2.2)
ALP SERPL-CCNC: 95 IU/L (ref 39–117)
ALT SERPL-CCNC: 31 IU/L (ref 0–32)
APPEARANCE UR: CLEAR
AST SERPL-CCNC: 24 IU/L (ref 0–40)
BACTERIA #/AREA URNS HPF: NORMAL /[HPF]
BILIRUB SERPL-MCNC: 0.3 MG/DL (ref 0–1.2)
BILIRUB UR QL STRIP: NEGATIVE
BUN SERPL-MCNC: 30 MG/DL (ref 6–24)
BUN/CREAT SERPL: 22 (ref 9–23)
CALCIUM SERPL-MCNC: 9.3 MG/DL (ref 8.7–10.2)
CASTS URNS QL MICRO: NORMAL /LPF
CHLORIDE SERPL-SCNC: 105 MMOL/L (ref 96–106)
CHOLEST SERPL-MCNC: 234 MG/DL (ref 100–199)
CO2 SERPL-SCNC: 20 MMOL/L (ref 20–29)
COLOR UR: YELLOW
CREAT SERPL-MCNC: 1.39 MG/DL (ref 0.57–1)
EPI CELLS #/AREA URNS HPF: NORMAL /HPF (ref 0–10)
ERYTHROCYTE [DISTWIDTH] IN BLOOD BY AUTOMATED COUNT: 11.8 % (ref 12.3–15.4)
GLOBULIN SER CALC-MCNC: 2.4 G/DL (ref 1.5–4.5)
GLUCOSE SERPL-MCNC: 92 MG/DL (ref 65–99)
GLUCOSE UR QL: NEGATIVE
HBA1C MFR BLD: 5.1 % (ref 4.8–5.6)
HCT VFR BLD AUTO: 38.2 % (ref 34–46.6)
HDLC SERPL-MCNC: 53 MG/DL
HGB BLD-MCNC: 12.9 G/DL (ref 11.1–15.9)
HGB UR QL STRIP: NEGATIVE
KETONES UR QL STRIP: NEGATIVE
LDLC SERPL CALC-MCNC: 160 MG/DL (ref 0–99)
LEUKOCYTE ESTERASE UR QL STRIP: ABNORMAL
MCH RBC QN AUTO: 31.5 PG (ref 26.6–33)
MCHC RBC AUTO-ENTMCNC: 33.8 G/DL (ref 31.5–35.7)
MCV RBC AUTO: 93 FL (ref 79–97)
MICRO URNS: ABNORMAL
MUCOUS THREADS URNS QL MICRO: PRESENT
NITRITE UR QL STRIP: NEGATIVE
PH UR STRIP: 6.5 [PH] (ref 5–7.5)
PLATELET # BLD AUTO: 232 X10E3/UL (ref 150–450)
POTASSIUM SERPL-SCNC: 4.4 MMOL/L (ref 3.5–5.2)
PROT SERPL-MCNC: 6.9 G/DL (ref 6–8.5)
PROT UR QL STRIP: NEGATIVE
RBC # BLD AUTO: 4.1 X10E6/UL (ref 3.77–5.28)
RBC #/AREA URNS HPF: NORMAL /HPF (ref 0–2)
SODIUM SERPL-SCNC: 140 MMOL/L (ref 134–144)
SP GR UR: 1.02 (ref 1–1.03)
TRIGL SERPL-MCNC: 105 MG/DL (ref 0–149)
TSH SERPL DL<=0.005 MIU/L-ACNC: 3.3 UIU/ML (ref 0.45–4.5)
UROBILINOGEN UR STRIP-MCNC: 0.2 MG/DL (ref 0.2–1)
VLDLC SERPL CALC-MCNC: 21 MG/DL (ref 5–40)
WBC # BLD AUTO: 6 X10E3/UL (ref 3.4–10.8)
WBC #/AREA URNS HPF: NORMAL /HPF (ref 0–5)

## 2019-09-05 NOTE — PROGRESS NOTES
Notify patient abnormal kidney test. Increase water intake. Avoid NSAIDS. Elevated cholesterol.  goal is less than 100. Follow low fat, low cholesterol diet.   Other labs normal.  Follow up 6 months

## 2019-09-10 LAB
CYTOLOGIST CVX/VAG CYTO: NORMAL
CYTOLOGY CVX/VAG DOC CYTO: NORMAL
CYTOLOGY CVX/VAG DOC THIN PREP: NORMAL
DX ICD CODE: NORMAL
LABCORP, 190119: NORMAL
Lab: NORMAL
Lab: NORMAL
OTHER STN SPEC: NORMAL
STAT OF ADQ CVX/VAG CYTO-IMP: NORMAL

## 2019-09-19 DIAGNOSIS — M75.82 TENDINITIS OF LEFT ROTATOR CUFF: ICD-10-CM

## 2019-09-19 RX ORDER — DICLOFENAC SODIUM 75 MG/1
TABLET, DELAYED RELEASE ORAL
Qty: 30 TAB | Refills: 0 | Status: SHIPPED | OUTPATIENT
Start: 2019-09-19 | End: 2019-12-23

## 2019-10-05 RX ORDER — LEVOTHYROXINE SODIUM 75 UG/1
TABLET ORAL
Qty: 30 TAB | Refills: 0 | Status: SHIPPED | OUTPATIENT
Start: 2019-10-05 | End: 2019-11-06 | Stop reason: SDUPTHER

## 2019-10-18 RX ORDER — CITALOPRAM 40 MG/1
TABLET, FILM COATED ORAL
Qty: 30 TAB | Refills: 4 | Status: SHIPPED | OUTPATIENT
Start: 2019-10-18 | End: 2020-02-27 | Stop reason: SDUPTHER

## 2019-10-18 NOTE — TELEPHONE ENCOUNTER
Received a refill request from [facility] for    Requested Prescriptions     Pending Prescriptions Disp Refills    citalopram (CELEXA) 40 mg tablet 30 Tab 4     Future Appointments   Date Time Provider Angelique Rodriguez   11/15/2019  1:40 PM Robert Buchanan MD [facility]   3/10/2020  8:45 AM Jose Cerrato MD [address]                         Last Appointment My Department:  8/20/2019    Please review

## 2019-10-31 DIAGNOSIS — G43.809 OTHER MIGRAINE WITHOUT STATUS MIGRAINOSUS, NOT INTRACTABLE: ICD-10-CM

## 2019-10-31 RX ORDER — SUMATRIPTAN 100 MG/1
TABLET, FILM COATED ORAL
Qty: 9 TAB | Refills: 0 | Status: SHIPPED | OUTPATIENT
Start: 2019-10-31 | End: 2019-12-02 | Stop reason: SDUPTHER

## 2019-11-05 ENCOUNTER — DOCUMENTATION ONLY (OUTPATIENT)
Dept: NEUROLOGY | Age: 54
End: 2019-11-05

## 2019-11-07 ENCOUNTER — DOCUMENTATION ONLY (OUTPATIENT)
Dept: NEUROLOGY | Age: 54
End: 2019-11-07

## 2019-11-07 NOTE — PROGRESS NOTES
botox came in the office: 11/7/2019  MFR: alice  LOT: [id number]  Exp: 40/2022  Appointment:11/15/2019  Specialty pharmacy:[facility]  Provider: Geo Gaxiola

## 2019-11-15 ENCOUNTER — OFFICE VISIT (OUTPATIENT)
Dept: NEUROLOGY | Age: 54
End: 2019-11-15

## 2019-11-15 VITALS — HEIGHT: 64 IN | HEART RATE: 76 BPM | BODY MASS INDEX: 34.15 KG/M2 | OXYGEN SATURATION: 98 % | WEIGHT: 200 LBS

## 2019-11-15 DIAGNOSIS — G43.719 CHRONIC MIGRAINE WITHOUT AURA, INTRACTABLE, WITHOUT STATUS MIGRAINOSUS: Primary | ICD-10-CM

## 2019-11-15 NOTE — PROCEDURES
[facility] NEUROLOGY CLINIC   CHEMODENERVATION PROCEDURE NOTE        Chart reviewed for the following:   Keshia Johnson MD, have reviewed the History, Physical and updated the Allergic reactions for [address] performed immediately prior to start of procedure:   I, Astrid Jara MD, have performed the following reviews on Rochelle Morrow prior to the start of the procedure:            * Patient was identified by name and date of birth   * Agreement on procedure being performed was verified  * Risks and Benefits explained to the patient  * Procedure site verified and marked as necessary  * Patient was positioned for comfort  * Consent was signed and verified     Time: 1345      Date of procedure: 11/15/2019    Procedure performed by:  Astrid Jara MD    Provider assisted by: None    Patient assisted by: None    How tolerated by patient: tolerated the procedure well with no complications    Post Procedural Pain Scale: 0    Comments: Topical anesthetic applied to the face and neck      Botox Injection Note       Indication: Patient has chronic recurrent migraine, no severe migraine headaches since the last treatment. Just mild sinus headaches. Previous preventatives: She tried depakote, topamax, lyrica, SSRIs, nortriptyline, and nicardipine (Ca ch blocker). She is also on baclofen    Procedure:   Botox concentration: 200 units in 4 ml of preservative-free normal saline. 31 sites injections, distribution as follow      Units/site  Sites Sides Subtotal    Procerus 5 1 1 5    5 1 2 10   Frontalis 5 2 2 20   Temporalis 5 4 2 40   Occipitalis 5 3 2 30   Upper cervical paraspinalis 5 2 2 20   Trapezius 5 3 2 30     Lot. No.  C3  Exp. Date 04/2022      200 units Botox were reconstituted, 155 units injected as above with 45 units of wastage. Patient tolerated procedure well. RTC in 3 months.

## 2019-11-19 DIAGNOSIS — F41.9 ANXIETY: ICD-10-CM

## 2019-11-20 RX ORDER — ALPRAZOLAM 0.5 MG/1
TABLET ORAL
Qty: 30 TAB | Refills: 0 | Status: SHIPPED | OUTPATIENT
Start: 2019-11-20 | End: 2020-04-27 | Stop reason: SDUPTHER

## 2019-12-02 DIAGNOSIS — G43.809 OTHER MIGRAINE WITHOUT STATUS MIGRAINOSUS, NOT INTRACTABLE: ICD-10-CM

## 2019-12-02 RX ORDER — SUMATRIPTAN 100 MG/1
TABLET, FILM COATED ORAL
Qty: 9 TAB | Refills: 0 | Status: SHIPPED | OUTPATIENT
Start: 2019-12-02 | End: 2020-01-02

## 2019-12-19 NOTE — TELEPHONE ENCOUNTER
Received a refill request from [place] for    Requested Prescriptions     Pending Prescriptions Disp Refills    topiramate ER (TROKENDI XR) 50 mg capsule 30 Cap 5     Future Appointments   Date Time Provider Angelique Jennifer   2/13/2020  1:00 PM Shana Perez  [facility]   3/10/2020  8:45 AM Robinson Barrett MD [address]                         Last Appointment My Department:  11/15/2019

## 2019-12-23 DIAGNOSIS — M75.82 TENDINITIS OF LEFT ROTATOR CUFF: ICD-10-CM

## 2019-12-23 RX ORDER — DICLOFENAC SODIUM 75 MG/1
TABLET, DELAYED RELEASE ORAL
Qty: 30 TAB | Refills: 0 | Status: SHIPPED | OUTPATIENT
Start: 2019-12-23 | End: 2020-04-27

## 2020-01-02 NOTE — TELEPHONE ENCOUNTER
Future Appointments   Date Time Provider Angelique Rodriguez   2/13/2020  1:00 PM Remy Dobbs  [facility]   3/10/2020  8:45 AM [facility]                         Last Appointment My Department:  11/15/2019 with Dr. Eve Barnes. Please review and send in refill below if warranted.     Requested Prescriptions     Pending Prescriptions Disp Refills    topiramate ER (TROKENDI XR) 50 mg capsule 30 Cap 0     Sig: TAKE 1 CAPSULE BY MOUTH ONCE DAILY

## 2020-01-12 DIAGNOSIS — E66.9 CLASS 1 OBESITY WITHOUT SERIOUS COMORBIDITY WITH BODY MASS INDEX (BMI) OF 34.0 TO 34.9 IN ADULT, UNSPECIFIED OBESITY TYPE: ICD-10-CM

## 2020-01-12 DIAGNOSIS — M62.838 MUSCLE SPASM: ICD-10-CM

## 2020-01-13 RX ORDER — BACLOFEN 10 MG/1
TABLET ORAL
Qty: 90 TAB | Refills: 2 | Status: SHIPPED | OUTPATIENT
Start: 2020-01-13 | End: 2020-04-27 | Stop reason: SDUPTHER

## 2020-01-13 RX ORDER — PHENTERMINE HYDROCHLORIDE 30 MG/1
30 CAPSULE ORAL
Qty: 30 CAP | Refills: 2 | Status: SHIPPED | OUTPATIENT
Start: 2020-01-13 | End: 2020-04-20

## 2020-01-13 NOTE — TELEPHONE ENCOUNTER
PCP: Jayne Mina MD    Last appt: 9/4/2019  Future Appointments   Date Time Provider Angelique Rodriguez   2/13/2020  1:00 PM Cecilia Flores MD [address]   3/10/2020  8:45 AM Jayne Mina MD [facility]       Requested Prescriptions     Pending Prescriptions Disp Refills    phentermine 30 mg capsule 30 Cap 2     Sig: Take 1 Cap by mouth every morning. Max Daily Amount: 30 mg.

## 2020-01-27 ENCOUNTER — DOCUMENTATION ONLY (OUTPATIENT)
Dept: NEUROLOGY | Age: 55
End: 2020-01-27

## 2020-02-07 ENCOUNTER — DOCUMENTATION ONLY (OUTPATIENT)
Dept: NEUROLOGY | Age: 55
End: 2020-02-07

## 2020-02-07 NOTE — PROGRESS NOTES
Spoke with Jeremy Marroquin at [facility] and she stated they were having issues verifying benefits for medical. They stated they would work it again and plan to get botox here by 2/11/2020

## 2020-02-09 DIAGNOSIS — G43.719 CHRONIC MIGRAINE WITHOUT AURA, INTRACTABLE, WITHOUT STATUS MIGRAINOSUS: ICD-10-CM

## 2020-02-13 ENCOUNTER — DOCUMENTATION ONLY (OUTPATIENT)
Dept: NEUROLOGY | Age: 55
End: 2020-02-13

## 2020-02-13 ENCOUNTER — OFFICE VISIT (OUTPATIENT)
Dept: NEUROLOGY | Age: 55
End: 2020-02-13

## 2020-02-13 VITALS
SYSTOLIC BLOOD PRESSURE: 124 MMHG | BODY MASS INDEX: 36.4 KG/M2 | HEIGHT: 64 IN | WEIGHT: 213.19 LBS | DIASTOLIC BLOOD PRESSURE: 76 MMHG

## 2020-02-13 DIAGNOSIS — G43.719 CHRONIC MIGRAINE WITHOUT AURA, INTRACTABLE, WITHOUT STATUS MIGRAINOSUS: Primary | ICD-10-CM

## 2020-02-13 NOTE — PROGRESS NOTES
botox came in the office: 2/13/2020  MFR: alice  LOT: [id number]  Exp: 06/2022  Appointment:2/13/2020  Specialty pharmacy: [facility]

## 2020-02-22 DIAGNOSIS — F41.9 ANXIETY: ICD-10-CM

## 2020-02-23 RX ORDER — ALPRAZOLAM 0.5 MG/1
TABLET ORAL
Qty: 30 TAB | OUTPATIENT
Start: 2020-02-23

## 2020-02-23 NOTE — TELEPHONE ENCOUNTER
Please clarify with patient. Is she still taking lyrica from the neurologist. Last filled in January. She can no longer take xanax and lyrica due to the high sedative effect.

## 2020-02-25 DIAGNOSIS — M79.7 FIBROMYALGIA: ICD-10-CM

## 2020-02-25 NOTE — TELEPHONE ENCOUNTER
Patient is requesting a refill for     Requested Prescriptions     Pending Prescriptions Disp Refills    pregabalin (LYRICA) 100 mg capsule 60 Cap 5     Sig: take 1 capsule by mouth twice a day     Future Appointments   Date Time Provider Angelique Rodriguez   3/31/2020  8:45 AM Luzmaria Lowery MD [address]   5/7/2020  2:20 PM Mandy Kim  [facility]                         Last Appointment My Department:  2/13/2020    Please review

## 2020-02-25 NOTE — TELEPHONE ENCOUNTER
Pt called to request a refill on her Lyrica. She is out of medication and has had a migraine for the past two days.       [phone]

## 2020-02-27 NOTE — TELEPHONE ENCOUNTER
Received a refill request from [facility] for    Requested Prescriptions     Pending Prescriptions Disp Refills    citalopram (CELEXA) 40 mg tablet 30 Tab 4     Sig: take 1 tablet by mouth once daily     Future Appointments   Date Time Provider Angelique Jennifer   3/31/2020  8:45 AM Tyree Bryan MD [address]   5/7/2020  2:20 PM Aarti Méndez  [facility]                         Last Appointment My Department:  2/13/2020    Please review

## 2020-02-28 RX ORDER — CITALOPRAM 40 MG/1
TABLET, FILM COATED ORAL
Qty: 30 TAB | Refills: 4 | Status: SHIPPED | OUTPATIENT
Start: 2020-02-28 | End: 2020-03-19 | Stop reason: SDUPTHER

## 2020-02-28 RX ORDER — PREGABALIN 100 MG/1
CAPSULE ORAL
Qty: 60 CAP | Refills: 5 | Status: SHIPPED | OUTPATIENT
Start: 2020-02-28 | End: 2020-09-09

## 2020-03-02 RX ORDER — MONTELUKAST SODIUM 10 MG/1
TABLET ORAL
Qty: 30 TAB | Refills: 3 | Status: SHIPPED | OUTPATIENT
Start: 2020-03-02 | End: 2020-06-19

## 2020-03-02 RX ORDER — LEVOTHYROXINE SODIUM 75 UG/1
TABLET ORAL
Qty: 30 TAB | Refills: 3 | Status: SHIPPED | OUTPATIENT
Start: 2020-03-02 | End: 2020-03-09

## 2020-03-02 NOTE — TELEPHONE ENCOUNTER
Requested Prescriptions     Pending Prescriptions Disp Refills    montelukast (SINGULAIR) 10 mg tablet 30 Tab 3    levothyroxine (SYNTHROID) 75 mcg tablet 30 Tab 3     PCP: Saige Cornelius MD    Last appt: 9/4/2019  Future Appointments   Date Time Provider Angelique Rodriguez   3/31/2020  8:45 AM Saige Cornelius MD [address]   5/7/2020  2:20 PM Hermelinda Severino  [facility] [place]       Requested Prescriptions     Pending Prescriptions Disp Refills    montelukast (SINGULAIR) 10 mg tablet 30 Tab 3    levothyroxine (SYNTHROID) 75 mcg tablet 30 Tab 3

## 2020-03-05 NOTE — TELEPHONE ENCOUNTER
Stephanie Cannon MD 11 days ago         Please clarify with patient. Is she still taking lyrica from the neurologist. Last filled in January. She can no longer take xanax and lyrica due to the high sedative effect. Documentation      Called, spoke to pt. Two pt identifiers confirmed. Pt advised per Dr. Kimberly Delgado. See above message. Pt states she is still taking Lyrica    Pt verbalized understanding of information discussed w/ no further questions at this time.

## 2020-03-09 RX ORDER — LEVOTHYROXINE SODIUM 75 UG/1
TABLET ORAL
Qty: 30 TAB | Refills: 3 | Status: SHIPPED | OUTPATIENT
Start: 2020-03-09 | End: 2020-06-21

## 2020-03-17 DIAGNOSIS — G43.719 CHRONIC MIGRAINE WITHOUT AURA, INTRACTABLE, WITHOUT STATUS MIGRAINOSUS: ICD-10-CM

## 2020-03-17 RX ORDER — TOPIRAMATE 50 MG/1
CAPSULE, EXTENDED RELEASE ORAL
Qty: 90 CAP | Refills: 3 | Status: SHIPPED | OUTPATIENT
Start: 2020-03-17 | End: 2020-03-19 | Stop reason: SDUPTHER

## 2020-03-19 DIAGNOSIS — G43.719 CHRONIC MIGRAINE WITHOUT AURA, INTRACTABLE, WITHOUT STATUS MIGRAINOSUS: ICD-10-CM

## 2020-03-19 RX ORDER — TOPIRAMATE 50 MG/1
CAPSULE, EXTENDED RELEASE ORAL
Qty: 30 CAP | Refills: 5 | Status: SHIPPED | OUTPATIENT
Start: 2020-03-19 | End: 2020-03-31 | Stop reason: SDUPTHER

## 2020-03-23 RX ORDER — CITALOPRAM 40 MG/1
TABLET, FILM COATED ORAL
Qty: 30 TAB | Refills: 4 | Status: SHIPPED | OUTPATIENT
Start: 2020-03-23 | End: 2020-07-27

## 2020-03-30 ENCOUNTER — TELEPHONE (OUTPATIENT)
Dept: NEUROLOGY | Age: 55
End: 2020-03-30

## 2020-03-30 NOTE — TELEPHONE ENCOUNTER
Re: Krishna MENJIVAR request     Sent to [facility] w/ trials/fails as noted on Dr. Letty Paige visit April of 2019      Medications previously tried for the headache: Ibuprofen, Advil, Aleve, Tylenol 3, Tylenol, Excedrin, Maxalt, Axert, Frova, Zomig, Relpax, Imitrex, Fiorinal, Fioricet, Percocet, Ultracet, Ultram, baclofen, Depakote, Neurontin, amitriptyline, Flonase, amitriptyline, Cymbalta, Prozac, Celexa, Zoloft, Effexor, Zyrtec, Medrol Dosepak, Celebrex, magnesium, coenzyme Q 10, Paxil, Wellbutrin, Topamax, Lyrica       Status is pending.

## 2020-03-31 DIAGNOSIS — G43.719 CHRONIC MIGRAINE WITHOUT AURA, INTRACTABLE, WITHOUT STATUS MIGRAINOSUS: ICD-10-CM

## 2020-03-31 RX ORDER — TOPIRAMATE 50 MG/1
CAPSULE, EXTENDED RELEASE ORAL
Qty: 30 CAP | Refills: 5 | Status: SHIPPED | OUTPATIENT
Start: 2020-03-31 | End: 2020-09-28

## 2020-04-03 ENCOUNTER — TELEPHONE (OUTPATIENT)
Dept: NEUROLOGY | Age: 55
End: 2020-04-03

## 2020-04-08 ENCOUNTER — TELEPHONE (OUTPATIENT)
Dept: NEUROLOGY | Age: 55
End: 2020-04-08

## 2020-04-09 ENCOUNTER — TELEPHONE (OUTPATIENT)
Dept: NEUROLOGY | Age: 55
End: 2020-04-09

## 2020-04-09 NOTE — TELEPHONE ENCOUNTER
Botox renewal request sent to [place] via [employer]. For both codes. It was approved this way under medical [facility] time.      Case Number: [id number]  ([address] (REQ CAT = HS) [facility] )  Member Number: [id number]  Member Name: Milady Black  Diagnosis Code: [id number] - CHRONIC MIGRAINE W/O AURA INTRACT W/O STAT MIGR    Action Code: A4 Pended    Reason: HIPAA #E8 - Requires Medical Review

## 2020-04-10 DIAGNOSIS — G43.719 CHRONIC MIGRAINE WITHOUT AURA, INTRACTABLE, WITHOUT STATUS MIGRAINOSUS: ICD-10-CM

## 2020-04-13 ENCOUNTER — TELEPHONE (OUTPATIENT)
Dept: NEUROLOGY | Age: 55
End: 2020-04-13

## 2020-04-13 NOTE — TELEPHONE ENCOUNTER
Botox approval:     Botox   and [id number] [id number] 4/13/20 - 3/13/2021 (Approved under MEDICAL BENEFIT and [facility] IS ON the Auth from [address] [facility]) . ID   Member ID: [id number]         SPP is [facility] [employer] [phone]. I have faxed the auth to [facility] to process for Botox appt 5/7/20.

## 2020-04-18 DIAGNOSIS — E66.9 CLASS 1 OBESITY WITHOUT SERIOUS COMORBIDITY WITH BODY MASS INDEX (BMI) OF 34.0 TO 34.9 IN ADULT, UNSPECIFIED OBESITY TYPE: ICD-10-CM

## 2020-04-20 RX ORDER — PHENTERMINE HYDROCHLORIDE 30 MG/1
CAPSULE ORAL
Qty: 30 CAP | Refills: 2 | Status: SHIPPED | OUTPATIENT
Start: 2020-04-20 | End: 2020-09-02

## 2020-04-23 ENCOUNTER — TELEPHONE (OUTPATIENT)
Dept: INTERNAL MEDICINE CLINIC | Age: 55
End: 2020-04-23

## 2020-04-23 NOTE — TELEPHONE ENCOUNTER
Prior Authorization initiated through cover my meds for Phentermine 30 mg capsule. Prior authorization for Phentermine 30 mg capsule has been denied.

## 2020-04-24 ENCOUNTER — TELEPHONE (OUTPATIENT)
Dept: INTERNAL MEDICINE CLINIC | Age: 55
End: 2020-04-24

## 2020-04-24 RX ORDER — ONABOTULINUMTOXINA 200 [USP'U]/1
INJECTION, POWDER, LYOPHILIZED, FOR SOLUTION INTRADERMAL; INTRAMUSCULAR
Qty: 1 VIAL | Refills: 3 | Status: SHIPPED | OUTPATIENT
Start: 2020-04-24 | End: 2021-04-16

## 2020-04-24 NOTE — TELEPHONE ENCOUNTER
Called, spoke to pt. Two pt identifiers confirmed. Pt informed per Dr. Kandy Maldonado  See message. May Skinner MD  You 17 hours ago (4:19 PM)      Advise patient phentermine not covered. Use goodrx    Routing comment        Pt verbalized understanding of information discussed w/ no further questions at this time.

## 2020-04-24 NOTE — TELEPHONE ENCOUNTER
Patient return call     Caller's first and last name and relationship (if not the patient):       Best contact number(s):  [phone]       Copy/paste Donato mayer

## 2020-04-24 NOTE — TELEPHONE ENCOUNTER
Called, spoke to pt. Two pt identifiers confirmed. Patient states she needs appointment for Anxiety,and weight gain  Patient offered and accepted appointment 04/27/2020 at 2 pm    Pt verbalized understanding of information discussed w/ no further questions at this time.      VV appointment 04/27/2020 at 2:00pm

## 2020-04-27 ENCOUNTER — VIRTUAL VISIT (OUTPATIENT)
Dept: INTERNAL MEDICINE CLINIC | Age: 55
End: 2020-04-27

## 2020-04-27 DIAGNOSIS — M62.838 MUSCLE SPASM: ICD-10-CM

## 2020-04-27 DIAGNOSIS — I10 ESSENTIAL HYPERTENSION: ICD-10-CM

## 2020-04-27 DIAGNOSIS — F41.9 ANXIETY: Primary | ICD-10-CM

## 2020-04-27 DIAGNOSIS — E03.9 ACQUIRED HYPOTHYROIDISM: ICD-10-CM

## 2020-04-27 DIAGNOSIS — G43.809 OTHER MIGRAINE WITHOUT STATUS MIGRAINOSUS, NOT INTRACTABLE: ICD-10-CM

## 2020-04-27 DIAGNOSIS — M79.7 FIBROMYALGIA: ICD-10-CM

## 2020-04-27 RX ORDER — BACLOFEN 10 MG/1
TABLET ORAL
Qty: 90 TAB | Refills: 2 | Status: SHIPPED | OUTPATIENT
Start: 2020-04-27 | End: 2020-07-20 | Stop reason: SDUPTHER

## 2020-04-27 RX ORDER — ALPRAZOLAM 0.5 MG/1
TABLET ORAL
Qty: 30 TAB | Refills: 0 | Status: SHIPPED | OUTPATIENT
Start: 2020-04-27 | End: 2021-05-06

## 2020-04-27 RX ORDER — NICARDIPINE HYDROCHLORIDE 30 MG/1
30 CAPSULE ORAL 3 TIMES DAILY
Qty: 270 CAP | Refills: 2 | Status: SHIPPED | OUTPATIENT
Start: 2020-04-27 | End: 2020-05-11 | Stop reason: RX

## 2020-04-27 NOTE — PROGRESS NOTES
Michelle Mustafa is a 48 y.o. female who presents for follow up. Weight increased from 173# in August 2018. November 2019 200#.  February 2020 213#. On phentermine 30mg once a day, not consistently. Not exercising, but is active at work. Trying to watch diet. Feeling more anxious, on Celexa 40mg daily and prior xanax use, not now. Reports increased stress with work at this time.       Has FM pain. On lyrica 100mg BID and  Baclofen prn, with improvement in her symptoms. .      Saw neurology. Treated for migraine, trokendi and imitrex.  Prior botox.    Off nicardipine. On zyrtec and singulair. No decongestants. This is an established visit conducted via telemedicine with video. The patient has been instructed that this meets HIPAA criteria and acknowledges and agrees to this method of visitation. Pursuant to the emergency declaration under the [facility], 1135 waiver authority and the [employer] Act, this Virtual Visit was conducted, with patient's consent, to reduce the patient's risk of exposure to COVID-19 and provide continuity of care for an established patient. Services were provided through a video synchronous discussion virtually to substitute for in-person clinic visit.         Past Medical History:   Diagnosis Date    Anxiety     Constipation     Essential hypertension     Fibromyalgia     Headache     Headache(784.0)     Hypothyroidism     Joint pain     Obstructive sleep apnea (adult) (pediatric) 5/16/2014    Sinus problem     Sleep trouble     Stress     Tiredness        Family History   Problem Relation Age of Onset    Diabetes Father     Hypertension Father     Cancer Mother         skin    Cancer Maternal Aunt     Cancer Maternal Uncle     Diabetes Maternal Uncle     Diabetes Paternal Aunt     Diabetes Maternal Grandmother     Parkinsonism Maternal Grandmother  Other Paternal Grandmother         amylodosis       Social History     Socioeconomic History    Marital status:      Spouse name: Not on file    Number of children: Not on file    Years of education: Not on file    Highest education level: Not on file   Occupational History    Not on file   Social Needs    Financial resource strain: Not on file    Food insecurity     Worry: Not on file     Inability: Not on file    Transportation needs     Medical: Not on file     Non-medical: Not on file   Tobacco Use    Smoking status: Former Smoker    Smokeless tobacco: Never Used   Substance and Sexual Activity    Alcohol use: Yes     Comment: social    Drug use: No    Sexual activity: Yes     Partners: Male     Birth control/protection: None   Lifestyle    Physical activity     Days per week: Not on file     Minutes per session: Not on file    Stress: Not on file   Relationships    Social connections     Talks on phone: Not on file     Gets together: Not on file     Attends Congregational service: Not on file     Active member of club or organization: Not on file     Attends meetings of clubs or organizations: Not on file     Relationship status: Not on file    Intimate partner violence     Fear of current or ex partner: Not on file     Emotionally abused: Not on file     Physically abused: Not on file     Forced sexual activity: Not on file   Other Topics Concern    Not on file   Social History Narrative    Not on file       Current Outpatient Medications on File Prior to Visit   Medication Sig Dispense Refill    phentermine 30 mg capsule TAKE 1 [address]  MAX DAILY AMOUNT: 30 MG 30 Cap 2    Trokendi XR 50 mg capsule TAKE ONE CAPSULE BY MOUTH EVERY DAY 30 Cap 5    citalopram (CELEXA) 40 mg tablet take 1 tablet by mouth once daily 30 Tab 4    levothyroxine (SYNTHROID) 75 mcg tablet TAKE 1 TABLET BY MOUTH EVERY MORNING ON AN EMPTY STOMACH 30 Tab 3    montelukast (SINGULAIR) 10 mg tablet TAKE 1 TABLET BY MOUTH ONCE DAILY 30 Tab 3    pregabalin (LYRICA) 100 mg capsule take 1 capsule by mouth twice a day 60 Cap 5    SUMAtriptan (IMITREX) 100 mg tablet TAKE 1/2 TO 1 TABLET BY MOUTH AT ONSET OF HEADACHE-- REPEAT IN 2 HOURS IF NEEDED 9 Tab 3    multivitamin (ONE A DAY) tablet Take 1 Tab by mouth daily.  aspirin-acetaminophen-caffeine (EXCEDRIN ES) 250-250-65 mg per tablet Take 2 Tabs by mouth every eight (8) hours as needed.  omega-3 fatty acids-vitamin e (FISH OIL) 1,000 mg cap Take 1 Cap by mouth.  onabotulinumtoxinA (Botox) 200 unit injection Inject 200 Units IM into 31 FDA approved sites to face and neck. Indication Migraine prevention 1 Vial 3    [DISCONTINUED] pregabalin (LYRICA) 100 mg capsule take 1 capsule by mouth twice a day 60 Cap 1    [DISCONTINUED] baclofen (LIORESAL) 10 mg tablet take 1 tablet by mouth three times a day 90 Tab 2    [DISCONTINUED] diclofenac EC (VOLTAREN) 75 mg EC tablet TAKE 1 TABLET BY MOUTH TWICE DAILY AS NEEDED FOR PAIN 30 Tab 0    [DISCONTINUED] ALPRAZolam (XANAX) 0.5 mg tablet TAKE 1 TABLET BY MOUTH TWICE DAILY AS NEEDED FOR ANXIETY 30 Tab 0    [DISCONTINUED] niCARdipine (CARDENE) 30 mg capsule Take 1 Cap by mouth three (3) times daily. 270 Cap 2     No current facility-administered medications on file prior to visit. Review of Systems  Pertinent items are noted in HPI. Objective:     Gen: well appearing female  HEENT: normal conjunctiva, no audible congestion, patient does not see oral erythema, has MMM  Neck: patient does not feel enlarged or tender LAD or masses  Resp: normal respiratory effort, no audible wheezing. CV: patient does not feel palpitations or heart irregularity  Abd: patient does not feel abdominal tenderness or mass, patient does not notice distension  Extrem: patient does not see swelling in ankles or joints.    Neuro: Alert and oriented, able to answer questions without difficulty, able to move all extremities and walk normally  Psych: anxious      Assessment/Plan:       ICD-10-CM ICD-9-CM    1. Anxiety F41.9 300.00 ALPRAZolam (XANAX) 0.5 mg tablet   2. Fibromyalgia M79.7 729.1    3. Other migraine without status migrainosus, not intractable G43.809 346.80    4. Acquired hypothyroidism E03.9 244.9    5. Essential hypertension I10 401.9 niCARdipine (CARDENE) 30 mg capsule   6. Muscle spasm M62.838 728.85 baclofen (LIORESAL) 10 mg tablet   resume prn xanax. Discussed self care. Hold phentermine due to anxiety. Increase exercise. This was a telemedicine visit with video. Mega Moise MD    Follow-up and Dispositions    · Return in about 4 months (around 9/1/2020) for annual and fasting labs. Tato Spring

## 2020-04-28 ENCOUNTER — DOCUMENTATION ONLY (OUTPATIENT)
Dept: NEUROLOGY | Age: 55
End: 2020-04-28

## 2020-04-28 ENCOUNTER — TELEPHONE (OUTPATIENT)
Dept: NEUROLOGY | Age: 55
End: 2020-04-28

## 2020-04-28 NOTE — PROGRESS NOTES
[facility]    verbal order for refill script given for botox  botox to be delivered 4?30/20

## 2020-04-30 ENCOUNTER — TELEPHONE (OUTPATIENT)
Dept: NEUROLOGY | Age: 55
End: 2020-04-30

## 2020-05-06 ENCOUNTER — TELEPHONE (OUTPATIENT)
Dept: INTERNAL MEDICINE CLINIC | Age: 55
End: 2020-05-06

## 2020-05-06 NOTE — TELEPHONE ENCOUNTER
[facility] requesting alternative for Nicardipine 30 mg capsules. Nicardipine 30 mg capsules are on backorder. All strength are out of stock in warehouse.

## 2020-05-07 ENCOUNTER — OFFICE VISIT (OUTPATIENT)
Dept: NEUROLOGY | Age: 55
End: 2020-05-07

## 2020-05-07 VITALS
SYSTOLIC BLOOD PRESSURE: 142 MMHG | DIASTOLIC BLOOD PRESSURE: 78 MMHG | BODY MASS INDEX: 36.37 KG/M2 | TEMPERATURE: 98.9 F | HEART RATE: 74 BPM | HEIGHT: 64 IN | OXYGEN SATURATION: 99 % | WEIGHT: 213 LBS | RESPIRATION RATE: 16 BRPM

## 2020-05-07 DIAGNOSIS — G43.719 CHRONIC MIGRAINE WITHOUT AURA, INTRACTABLE, WITHOUT STATUS MIGRAINOSUS: Primary | ICD-10-CM

## 2020-05-11 ENCOUNTER — PATIENT MESSAGE (OUTPATIENT)
Dept: INTERNAL MEDICINE CLINIC | Age: 55
End: 2020-05-11

## 2020-05-11 RX ORDER — AMLODIPINE BESYLATE 5 MG/1
5 TABLET ORAL DAILY
Qty: 90 TAB | Refills: 3 | Status: SHIPPED | OUTPATIENT
Start: 2020-05-11 | End: 2020-09-02 | Stop reason: SINTOL

## 2020-05-11 NOTE — TELEPHONE ENCOUNTER
----- Message from Amrita Farnsworth LPN sent at 5/18/7512  2:16 PM EDT -----  Regarding: FW: Prescription Question  Contact: [phone]    ----- Message -----  From: Dorota Sim: 5/11/2020   1:35 PM EDT  To: [facility] Nurse Pool  Subject: Prescription Question                            Two weeks ago, you sent [facility] a prescription for me for Nicardipine. They can't get any in stock no matter the dosage. Is there something else that could be used in place of it?     Thanks,    Epi Lilly

## 2020-05-11 NOTE — PROGRESS NOTES
48year old female with a medical history of migraines. She was receiving chemodenervation first under the [facility] followed by Dr Charley Griffith. First treatment of Botox for migraines was in 2013. Frequency of migraines prior to Botox was daily. Migraines lasted for hours or more and were associated with photophobia and nausea. Medications tried  NSAIDs: Advil Aleve  Antidepressants: Cymbalta Prozac Celexa Zoloft Effexor  Antiepileptics tried: Topamax Lyrica amitriptyline nortriptyline Neurontin Depakote  Others tried: Excedrin, Tylenol, Fioricet, Fiorinal, Ultracet, Ultram, baclofen, Tylenol  Triptans tried: Zomig, Relpax, Imitrex, Maxalt, Axert  All the above medications failed to reduce migraine frequency to less than 15 migraines a month and she was started on  Botox which successfully reduced migraines to 10 migraines a month a more than 70 percent reduction.

## 2020-05-11 NOTE — TELEPHONE ENCOUNTER
----- Message from Bismark Thorne LPN sent at 3/95/3343  2:16 PM EDT -----  Regarding: FW: Prescription Question  Contact: [phone]    ----- Message -----  From: Griselda Poles: 5/11/2020   1:35 PM EDT  To: Paul [facility] Nurse Pool  Subject: Prescription Question                            Two weeks ago, you sent [facility] a prescription for me for Nicardipine. They can't get any in stock no matter the dosage. Is there something else that could be used in place of it?     Thanks,    Shantel Kunz

## 2020-05-27 ENCOUNTER — TELEPHONE (OUTPATIENT)
Dept: NEUROLOGY | Age: 55
End: 2020-05-27

## 2020-05-27 NOTE — TELEPHONE ENCOUNTER
Re: Ale Sunshine to see if pt was able to obtain as I had not received response back from Nikki on outcome of P. A. Patient has picked up Rx March, April and May with a zero dollar copay.

## 2020-06-19 RX ORDER — MONTELUKAST SODIUM 10 MG/1
TABLET ORAL
Qty: 30 TAB | Refills: 3 | Status: SHIPPED | OUTPATIENT
Start: 2020-06-19 | End: 2020-10-15

## 2020-06-21 RX ORDER — LEVOTHYROXINE SODIUM 75 UG/1
TABLET ORAL
Qty: 30 TAB | Refills: 3 | Status: SHIPPED | OUTPATIENT
Start: 2020-06-21 | End: 2020-10-16

## 2020-07-09 ENCOUNTER — DOCUMENTATION ONLY (OUTPATIENT)
Dept: NEUROLOGY | Age: 55
End: 2020-07-09

## 2020-07-13 ENCOUNTER — TELEPHONE (OUTPATIENT)
Dept: NEUROLOGY | Age: 55
End: 2020-07-13

## 2020-07-13 NOTE — TELEPHONE ENCOUNTER
Botox   and [id number] [facility]  [id number] 4/13/20 - 3/13/2021 (Approved under MEDICAL BENEFIT and [facility] IS ON the Auth from [address] [facility]) . bb. SPP is [facility] [employer] [phone]. fax [phone]    Faxed Botox Rx to [facility] 6/2/20. irving

## 2020-07-15 ENCOUNTER — TELEPHONE (OUTPATIENT)
Dept: INTERNAL MEDICINE CLINIC | Age: 55
End: 2020-07-15

## 2020-07-15 NOTE — TELEPHONE ENCOUNTER
Patient reports swelling in both legs, worse on left. Contusion left lower leg 2 months ago. Swelling worse when walking a lot, 2 weeks ago. Watches salt, elevates legs. Changed from nicardipine to amlodipine. Seen in ED negative us for DVT. Recommend reduce the amlodipine to 2.5mg daily. Call at 2 weeks.

## 2020-07-16 ENCOUNTER — DOCUMENTATION ONLY (OUTPATIENT)
Dept: NEUROLOGY | Age: 55
End: 2020-07-16

## 2020-07-16 NOTE — PROGRESS NOTES
botox came in the office: 7/16/2020  MFR: alice  LOT: [id number]  Exp: 2/2023  Appointment: 7/30/2020 Cliff  Specialty [facility]

## 2020-07-20 DIAGNOSIS — M62.838 MUSCLE SPASM: ICD-10-CM

## 2020-07-20 RX ORDER — BACLOFEN 10 MG/1
TABLET ORAL
Qty: 90 TAB | Refills: 2 | Status: SHIPPED | OUTPATIENT
Start: 2020-07-20 | End: 2020-10-20

## 2020-07-27 RX ORDER — CITALOPRAM 40 MG/1
TABLET, FILM COATED ORAL
Qty: 30 TAB | Refills: 11 | Status: SHIPPED | OUTPATIENT
Start: 2020-07-27 | End: 2020-09-02 | Stop reason: ALTCHOICE

## 2020-07-30 ENCOUNTER — HOSPITAL ENCOUNTER (OUTPATIENT)
Dept: MAMMOGRAPHY | Age: 55
Discharge: HOME | End: 2020-07-30
Attending: FAMILY MEDICINE
Payer: COMMERCIAL

## 2020-07-30 ENCOUNTER — OFFICE VISIT (OUTPATIENT)
Dept: NEUROLOGY | Age: 55
End: 2020-07-30

## 2020-07-30 VITALS — HEART RATE: 64 BPM | HEIGHT: 64 IN | BODY MASS INDEX: 36.56 KG/M2 | TEMPERATURE: 98.2 F | OXYGEN SATURATION: 98 %

## 2020-07-30 DIAGNOSIS — Z12.31 VISIT FOR SCREENING MAMMOGRAM: ICD-10-CM

## 2020-07-30 DIAGNOSIS — G43.719 CHRONIC MIGRAINE WITHOUT AURA, INTRACTABLE, WITHOUT STATUS MIGRAINOSUS: Primary | ICD-10-CM

## 2020-07-30 PROCEDURE — 77063 BREAST TOMOSYNTHESIS BI: CPT

## 2020-07-30 NOTE — PROCEDURES
Procedure: Chemodenervation for Chronic Intractable Migraines    After consenting the patient and discussing the procedure and possible side effects. The chemodenervant was reconstituted as follows:  200 units of botox was mixed with 4ml of perservative free saline and withdrawn, using a into four sterile 1ml BD syringes. Sterile 30 gauge half inch needles were affixed to the syringes. Botox  MFR: alice  LOT: [id number]  Exp: 2/2023    Procedure   Chemodenervant was injected into the following muscles which were identified using their anatomical land marks. Each site was aspirated prior to injection to ensure that there was no vascular compromise. Muscle Units/inj No. Of injections Total   Trapezius BL 5 U 4 each 40 units   Cervical Paraspinals BL 5 U 2 each 20 units   Occipitalis BL 5 U 3 each 30 units   Temporalis BL 5 U 4 each 40 units    BL 5 U 1 each 10 units   Procerus 5 U 1 5 units   Frontalis BL 5 U 2 each  20 units         Total units  Waste 35 units 165     Nominal bleeding at injection sites. Patient tolerated the procedure well. No reported pain following the procedure.

## 2020-07-30 NOTE — PROGRESS NOTES
48year old female with a medical history of migraines. She was receiving chemodenervation first under the [facility] followed by Dr Rossy Arias. First treatment of Botox for migraines was in 2013. Frequency of migraines prior to Botox was daily. Migraines lasted for hours or more and were associated with photophobia and nausea. Medications tried  NSAIDs: Advil Aleve  Antidepressants: Cymbalta Prozac Celexa Zoloft Effexor  Antiepileptics tried: Topamax Lyrica amitriptyline nortriptyline Neurontin Depakote  Others tried: Excedrin, Tylenol, Fioricet, Fiorinal, Ultracet, Ultram, baclofen, Tylenol  Triptans tried: Zomig, Relpax, Imitrex, Maxalt, Axert  All the above medications failed to reduce migraine frequency to less than 15 migraines a month and she was started on  Botox which successfully reduced migraines to 10 migraines a month a more than 70 percent reduction.

## 2020-08-17 DIAGNOSIS — G43.809 OTHER MIGRAINE WITHOUT STATUS MIGRAINOSUS, NOT INTRACTABLE: ICD-10-CM

## 2020-08-17 RX ORDER — SUMATRIPTAN 100 MG/1
TABLET, FILM COATED ORAL
Qty: 9 TAB | Refills: 3 | Status: SHIPPED | OUTPATIENT
Start: 2020-08-17 | End: 2020-12-22

## 2020-09-02 ENCOUNTER — OFFICE VISIT (OUTPATIENT)
Dept: INTERNAL MEDICINE CLINIC | Age: 55
End: 2020-09-02
Payer: COMMERCIAL

## 2020-09-02 VITALS
HEIGHT: 64 IN | WEIGHT: 224 LBS | TEMPERATURE: 97.9 F | RESPIRATION RATE: 16 BRPM | HEART RATE: 60 BPM | DIASTOLIC BLOOD PRESSURE: 71 MMHG | SYSTOLIC BLOOD PRESSURE: 115 MMHG | OXYGEN SATURATION: 96 % | BODY MASS INDEX: 38.24 KG/M2

## 2020-09-02 DIAGNOSIS — I10 ESSENTIAL HYPERTENSION: ICD-10-CM

## 2020-09-02 DIAGNOSIS — Z00.00 ROUTINE MEDICAL EXAM: Primary | ICD-10-CM

## 2020-09-02 DIAGNOSIS — R20.2 PARESTHESIA: ICD-10-CM

## 2020-09-02 DIAGNOSIS — E66.9 CLASS 1 OBESITY WITHOUT SERIOUS COMORBIDITY WITH BODY MASS INDEX (BMI) OF 34.0 TO 34.9 IN ADULT, UNSPECIFIED OBESITY TYPE: ICD-10-CM

## 2020-09-02 DIAGNOSIS — E66.9 CLASS 2 OBESITY WITHOUT SERIOUS COMORBIDITY WITH BODY MASS INDEX (BMI) OF 38.0 TO 38.9 IN ADULT, UNSPECIFIED OBESITY TYPE: ICD-10-CM

## 2020-09-02 DIAGNOSIS — Z23 ENCOUNTER FOR IMMUNIZATION: ICD-10-CM

## 2020-09-02 DIAGNOSIS — F41.9 ANXIETY: ICD-10-CM

## 2020-09-02 DIAGNOSIS — E55.9 VITAMIN D DEFICIENCY: ICD-10-CM

## 2020-09-02 DIAGNOSIS — E78.00 PURE HYPERCHOLESTEROLEMIA: ICD-10-CM

## 2020-09-02 DIAGNOSIS — G43.809 OTHER MIGRAINE WITHOUT STATUS MIGRAINOSUS, NOT INTRACTABLE: ICD-10-CM

## 2020-09-02 DIAGNOSIS — M79.7 FIBROMYALGIA: ICD-10-CM

## 2020-09-02 DIAGNOSIS — E03.9 ACQUIRED HYPOTHYROIDISM: ICD-10-CM

## 2020-09-02 PROCEDURE — 90686 IIV4 VACC NO PRSV 0.5 ML IM: CPT

## 2020-09-02 PROCEDURE — 90471 IMMUNIZATION ADMIN: CPT

## 2020-09-02 PROCEDURE — 99396 PREV VISIT EST AGE 40-64: CPT | Performed by: FAMILY MEDICINE

## 2020-09-02 RX ORDER — DULOXETIN HYDROCHLORIDE 60 MG/1
60 CAPSULE, DELAYED RELEASE ORAL DAILY
Qty: 90 CAP | Refills: 3 | Status: SHIPPED | OUTPATIENT
Start: 2020-09-02 | End: 2021-01-27 | Stop reason: SDUPTHER

## 2020-09-02 NOTE — PROGRESS NOTES
Georgette Huffman is a 50 y.o. female who presents for annual exam. Last seen by virtual visit on April 27. Weight increased from 173# in August 2018. November 2019 200#.  February 2020 213#. Today 224#.   Stress eating. On phentermine 30mg once a day, stopped due to anxiety. No change in anxiety on or off the medication. Not exercising, but is active at work. Trying to watch diet. Has a fit bit, not consistently tracking. Reports tingling in hands and feet. Ongoing, getting worse. On topamax, but no change in dose. Interested in trying cymbalta. Was on it in the past for FM, does not recall issues with it.       On Celexa 40mg daily and prior xanax use, Prior Prozac, not tolerated. Anxiety is not controlled. Off amlodipine, swelling is gone. Taking vitamin D. Not sure of amount.      Has FM pain. On lyrica 100mg BID and  Baclofen prn, with improvement in her symptoms. .      Saw neurology. Treated for migraine, trokendi and imitrex. Up to date on eye (cataracts) and dentist.       On zyrtec and singulair. No decongestants. Normal pap Sept 2019. Postmenopausal. No DUB    Prior colonoscopy, not a good study. Wants to defer. Irregular BM.  No family history of colon cancer.           Past Medical History:   Diagnosis Date    Anxiety     Constipation     Essential hypertension     Fibromyalgia     Headache     Headache(784.0)     Hypothyroidism     Joint pain     Obstructive sleep apnea (adult) (pediatric) 5/16/2014    Sinus problem     Sleep trouble     Stress     Tiredness        Family History   Problem Relation Age of Onset    Diabetes Father     Hypertension Father     Cancer Mother         skin    Cancer Maternal Aunt     Cancer Maternal Uncle     Diabetes Maternal Uncle     Diabetes Paternal Aunt     Diabetes Maternal Grandmother     Parkinsonism Maternal Grandmother     Other Paternal Grandmother         amylodosis       Social History     Socioeconomic History    Marital status:      Spouse name: Not on file    Number of children: Not on file    Years of education: Not on file    Highest education level: Not on file   Occupational History    Not on file   Social Needs    Financial resource strain: Not on file    Food insecurity     Worry: Not on file     Inability: Not on file    Transportation needs     Medical: Not on file     Non-medical: Not on file   Tobacco Use    Smoking status: Former Smoker    Smokeless tobacco: Never Used   Substance and Sexual Activity    Alcohol use: Yes     Comment: social    Drug use: No    Sexual activity: Not Currently     Partners: Male     Birth control/protection: None   Lifestyle    Physical activity     Days per week: Not on file     Minutes per session: Not on file    Stress: Not on file   Relationships    Social connections     Talks on phone: Not on file     Gets together: Not on file     Attends Yazidi service: Not on file     Active member of club or organization: Not on file     Attends meetings of clubs or organizations: Not on file     Relationship status: Not on file    Intimate partner violence     Fear of current or ex partner: Not on file     Emotionally abused: Not on file     Physically abused: Not on file     Forced sexual activity: Not on file   Other Topics Concern    Not on file   Social History Narrative    Not on file       Current Outpatient Medications on File Prior to Visit   Medication Sig Dispense Refill    SUMAtriptan (IMITREX) 100 mg tablet TAKE 1/2 TO 1 TABLET BY MOUTH AT ONSET OF HEADACHE.  REPEAT IN 2 HOURS AS NEEDED 9 Tab 3    baclofen (LIORESAL) 10 mg tablet TAKE 1 TABLET BY MOUTH THREE TIMES DAILY 90 Tab 2    levothyroxine (Synthroid) 75 mcg tablet TAKE 1 TABLET EVERY MORNINGON AN EMPTY STOMACH 30 Tab 3    montelukast (SINGULAIR) 10 mg tablet TAKE 1 TABLET ONCE DAILY 30 Tab 3    ALPRAZolam (XANAX) 0.5 mg tablet TAKE 1 TABLET BY MOUTH TWICE DAILY AS NEEDED FOR ANXIETY 30 Tab 0    onabotulinumtoxinA (Botox) 200 unit injection Inject 200 Units IM into 31 FDA approved sites to face and neck. Indication Migraine prevention 1 Vial 3    Trokendi XR 50 mg capsule TAKE ONE CAPSULE BY MOUTH EVERY DAY 30 Cap 5    pregabalin (LYRICA) 100 mg capsule take 1 capsule by mouth twice a day 60 Cap 5    multivitamin (ONE A DAY) tablet Take 1 Tab by mouth daily.  aspirin-acetaminophen-caffeine (EXCEDRIN ES) 250-250-65 mg per tablet Take 2 Tabs by mouth every eight (8) hours as needed.  omega-3 fatty acids-vitamin e (FISH OIL) 1,000 mg cap Take 1 Cap by mouth.  [DISCONTINUED] citalopram (CELEXA) 40 mg tablet TAKE 1 TABLET ONCE DAILY 30 Tab 11    [DISCONTINUED] amLODIPine (NORVASC) 5 mg tablet Take 1 Tab by mouth daily. 90 Tab 3    [DISCONTINUED] phentermine 30 mg capsule TAKE 1 CAPSULE BY MOUTH EVERY MORNING. MAX DAILY AMOUNT: 30 MG 30 Cap 2     No current facility-administered medications on file prior to visit. Review of Systems  Pertinent items are noted in HPI. Objective:     Visit Vitals  /71 (BP 1 Location: Left arm, BP Patient Position: Sitting)   Pulse 60   Temp 97.9 °F (36.6 °C) (Temporal)   Resp 16   Ht 5' 4\" (1.626 m)   Wt 224 lb (101.6 kg)   SpO2 96%   BMI 38.45 kg/m²     Gen: well appearing female  HEENT:   PERRL,normal conjunctiva. External ear and canals normal, TMs no opacification or erythema,  OP no erythema, no exudates, MMM  Neck:  Supple. Thyroid normal size, nontender, without nodules. No masses or LAD  Resp:  No wheezing, no rhonchi, no rales. CV:  RRR, normal S1S2, no murmur. GI: soft, nontender, without masses. No hepatosplenomegaly. Extrem:  +2 pulses, no edema, warm distally      Assessment/Plan:       ICD-10-CM ICD-9-CM    1. Routine medical exam  W90.20 O20.6 METABOLIC PANEL, COMPREHENSIVE      CBC W/O DIFF      LIPID PANEL      TSH 3RD GENERATION      URINALYSIS W/ RFLX MICROSCOPIC      HEMOGLOBIN A1C W/O EAG   2.  Class 1 obesity without serious comorbidity with body mass index (BMI) of 34.0 to 34.9 in adult, unspecified obesity type  E66.9 278.00     Z68.34 V85.34    3. Pure hypercholesterolemia  E78.00 272.0    4. Fibromyalgia  M79.7 729.1    5. Anxiety  F41.9 300.00    6. Acquired hypothyroidism  E03.9 244.9 TSH 3RD GENERATION   7. Essential hypertension  I10 401.9    8. Encounter for immunization  Z23 V03.89 INFLUENZA VIRUS VAC QUAD,SPLIT,PRESV FREE SYRINGE IM   9. Class 2 obesity without serious comorbidity with body mass index (BMI) of 38.0 to 38.9 in adult, unspecified obesity type  E66.9 278.00 TSH 3RD GENERATION    Z68.38 V85.38    10. Vitamin D deficiency  E55.9 268.9 VITAMIN D, 25 HYDROXY   11. Paresthesia  R20.2 782.0 VITAMIN B12   12. Other migraine without status migrainosus, not intractable  G43.809 346.80    reduce celexa to 20mg for 2 weeks, then stop and start cymbalta 60mg daily. Follow-up and Dispositions    · Return in about 6 months (around 3/2/2021) for follow up pending labs and 6 months.  Juan R Tony MD

## 2020-09-02 NOTE — PATIENT INSTRUCTIONS
Office Policies Phone calls/patient messages: Please allow up to 24 hours for someone in the office to contact you about your call or message. Be mindful your provider may be out of the office or your message may require further review. We encourage you to use [employer] for your messages as this is a faster, more efficient way to communicate with our office Medication Refills: 
         
Prescription medications require 48-72 business hours to process. We encourage you to use [employer] for your refills. For controlled medications: Please allow 72 business hours to process. Certain medications may require you to  a written prescription at our office. NO narcotic/controlled medications will be prescribed after 4pm Monday through Friday or on weekends Form/Paperwork Completion: 
         
Please note a $25 fee may incur for all paperwork for completed by our providers. We ask that you allow 7-10 business days. Pre-payment is due prior to picking up/faxing the completed form. You may also download your forms to [employer] to have your doctor print off.

## 2020-09-03 LAB
25(OH)D3+25(OH)D2 SERPL-MCNC: 36.4 NG/ML (ref 30–100)
ALBUMIN SERPL-MCNC: 4.3 G/DL (ref 3.8–4.9)
ALBUMIN/GLOB SERPL: 1.8 {RATIO} (ref 1.2–2.2)
ALP SERPL-CCNC: 117 IU/L (ref 39–117)
ALT SERPL-CCNC: 20 IU/L (ref 0–32)
APPEARANCE UR: CLEAR
AST SERPL-CCNC: 23 IU/L (ref 0–40)
BACTERIA #/AREA URNS HPF: ABNORMAL /[HPF]
BILIRUB SERPL-MCNC: 0.3 MG/DL (ref 0–1.2)
BILIRUB UR QL STRIP: NEGATIVE
BUN SERPL-MCNC: 22 MG/DL (ref 6–24)
BUN/CREAT SERPL: 20 (ref 9–23)
CALCIUM SERPL-MCNC: 9.3 MG/DL (ref 8.7–10.2)
CASTS URNS QL MICRO: ABNORMAL /LPF
CHLORIDE SERPL-SCNC: 105 MMOL/L (ref 96–106)
CHOLEST SERPL-MCNC: 206 MG/DL (ref 100–199)
CO2 SERPL-SCNC: 22 MMOL/L (ref 20–29)
COLOR UR: YELLOW
CREAT SERPL-MCNC: 1.12 MG/DL (ref 0.57–1)
EPI CELLS #/AREA URNS HPF: >10 /HPF (ref 0–10)
ERYTHROCYTE [DISTWIDTH] IN BLOOD BY AUTOMATED COUNT: 12.5 % (ref 11.7–15.4)
GLOBULIN SER CALC-MCNC: 2.4 G/DL (ref 1.5–4.5)
GLUCOSE SERPL-MCNC: 97 MG/DL (ref 65–99)
GLUCOSE UR QL: NEGATIVE
HBA1C MFR BLD: 5.4 % (ref 4.8–5.6)
HCT VFR BLD AUTO: 39.5 % (ref 34–46.6)
HDLC SERPL-MCNC: 57 MG/DL
HGB BLD-MCNC: 13.1 G/DL (ref 11.1–15.9)
HGB UR QL STRIP: ABNORMAL
KETONES UR QL STRIP: NEGATIVE
LDLC SERPL CALC-MCNC: 124 MG/DL (ref 0–99)
LEUKOCYTE ESTERASE UR QL STRIP: ABNORMAL
MCH RBC QN AUTO: 30.8 PG (ref 26.6–33)
MCHC RBC AUTO-ENTMCNC: 33.2 G/DL (ref 31.5–35.7)
MCV RBC AUTO: 93 FL (ref 79–97)
MICRO URNS: ABNORMAL
MUCOUS THREADS URNS QL MICRO: PRESENT
NITRITE UR QL STRIP: NEGATIVE
PH UR STRIP: 7 [PH] (ref 5–7.5)
PLATELET # BLD AUTO: 243 X10E3/UL (ref 150–450)
POTASSIUM SERPL-SCNC: 4.3 MMOL/L (ref 3.5–5.2)
PROT SERPL-MCNC: 6.7 G/DL (ref 6–8.5)
PROT UR QL STRIP: ABNORMAL
RBC # BLD AUTO: 4.25 X10E6/UL (ref 3.77–5.28)
RBC #/AREA URNS HPF: ABNORMAL /HPF (ref 0–2)
SODIUM SERPL-SCNC: 139 MMOL/L (ref 134–144)
SP GR UR: 1.02 (ref 1–1.03)
TRIGL SERPL-MCNC: 140 MG/DL (ref 0–149)
TSH SERPL DL<=0.005 MIU/L-ACNC: 2.19 UIU/ML (ref 0.45–4.5)
UROBILINOGEN UR STRIP-MCNC: 0.2 MG/DL (ref 0.2–1)
VIT B12 SERPL-MCNC: 515 PG/ML (ref 232–1245)
VLDLC SERPL CALC-MCNC: 25 MG/DL (ref 5–40)
WBC # BLD AUTO: 6.5 X10E3/UL (ref 3.4–10.8)
WBC #/AREA URNS HPF: ABNORMAL /HPF (ref 0–5)

## 2020-09-05 DIAGNOSIS — M79.7 FIBROMYALGIA: ICD-10-CM

## 2020-09-09 RX ORDER — PREGABALIN 100 MG/1
CAPSULE ORAL
Qty: 60 CAP | Refills: 5 | Status: SHIPPED | OUTPATIENT
Start: 2020-09-09 | End: 2021-03-07

## 2020-09-25 DIAGNOSIS — G43.719 CHRONIC MIGRAINE WITHOUT AURA, INTRACTABLE, WITHOUT STATUS MIGRAINOSUS: ICD-10-CM

## 2020-09-28 RX ORDER — TOPIRAMATE 50 MG/1
CAPSULE, EXTENDED RELEASE ORAL
Qty: 30 CAP | Refills: 5 | Status: SHIPPED | OUTPATIENT
Start: 2020-09-28 | End: 2021-01-12 | Stop reason: SDUPTHER

## 2020-10-15 RX ORDER — MONTELUKAST SODIUM 10 MG/1
TABLET ORAL
Qty: 30 TAB | Refills: 3 | Status: SHIPPED | OUTPATIENT
Start: 2020-10-15 | End: 2021-02-12

## 2020-10-16 RX ORDER — LEVOTHYROXINE SODIUM 75 UG/1
TABLET ORAL
Qty: 30 TAB | Refills: 3 | Status: SHIPPED | OUTPATIENT
Start: 2020-10-16 | End: 2021-02-14

## 2020-10-20 DIAGNOSIS — M62.838 MUSCLE SPASM: ICD-10-CM

## 2020-10-20 RX ORDER — BACLOFEN 10 MG/1
TABLET ORAL
Qty: 90 TAB | Refills: 2 | Status: SHIPPED | OUTPATIENT
Start: 2020-10-20 | End: 2021-01-19

## 2020-10-28 ENCOUNTER — TELEPHONE (OUTPATIENT)
Dept: NEUROLOGY | Age: 55
End: 2020-10-28

## 2020-10-28 NOTE — TELEPHONE ENCOUNTER
----- Message from Campos Cagle sent at 10/28/2020  2:24 PM EDT -----  Regarding: Dr. Charley Taylor (if not patient):[facility](MIGUEL)      Relationship of caller (if not patient):      Best contact number(s):[phone]      Name of medication and dosage if known:\"Botox\"      Is patient out of this medication (yes/no):      Pharmacy name[place]    Pharmacy listed in chart? (yes/no):  Pharmacy phone number:      Details to clarify the request:[facility] requested a call from \"Yoselin\" regarding pt's prior auth for Botox. [facility] Donato stated [facility] not [facility].       Campos Cagle

## 2020-11-03 ENCOUNTER — DOCUMENTATION ONLY (OUTPATIENT)
Dept: NEUROLOGY | Age: 55
End: 2020-11-03

## 2020-11-03 NOTE — PROGRESS NOTES
botox came in the office: 11/3/2020  MFR: alice  LOT: [id number]  Exp: 6/2023  Appointment: 11/5/2020  Provider: Van Massey  Specialty pharmacy: [facility] specialty  Pharmacy Phone Number: [phone]

## 2020-11-05 ENCOUNTER — OFFICE VISIT (OUTPATIENT)
Dept: NEUROLOGY | Age: 55
End: 2020-11-05
Payer: COMMERCIAL

## 2020-11-05 DIAGNOSIS — G43.711 CHRONIC MIGRAINE WITHOUT AURA, WITH INTRACTABLE MIGRAINE, SO STATED, WITH STATUS MIGRAINOSUS: ICD-10-CM

## 2020-11-05 PROCEDURE — 64615 CHEMODENERV MUSC MIGRAINE: CPT | Performed by: PSYCHIATRY & NEUROLOGY

## 2020-11-17 NOTE — PROGRESS NOTES
48year old female with a medical history of migraines. She was receiving chemodenervation first under the [facility] followed by Dr Nadia Jackman. First treatment of Botox for migraines was in 2013. Frequency of migraines prior to Botox was daily. Migraines lasted for hours or more and were associated with photophobia and nausea. Medications tried  NSAIDs: Advil Aleve  Antidepressants: Cymbalta Prozac Celexa Zoloft Effexor  Antiepileptics tried: Topamax Lyrica amitriptyline nortriptyline Neurontin Depakote  Others tried: Excedrin, Tylenol, Fioricet, Fiorinal, Ultracet, Ultram, baclofen, Tylenol  Triptans tried: Zomig, Relpax, Imitrex, Maxalt, Axert  All the above medications failed to reduce migraine frequency to less than 15 migraines a month and she was started on  Botox which successfully reduced migraines to 10 migraines a month a more than 70 percent reduction.

## 2020-11-17 NOTE — PROCEDURES
Procedure: Chemodenervation for Chronic Intractable Migraines    After consenting the patient and discussing the procedure and possible side effects. The chemodenervant was reconstituted as follows:  200 units of botox was mixed with 4ml of perservative free saline and withdrawn, using a into four sterile 1ml BD syringes. Sterile 30 gauge half inch needles were affixed to the syringes. Procedure   Chemodenervant was injected into the following muscles which were identified using their anatomical land marks. Each site was aspirated prior to injection to ensure that there was no vascular compromise. Muscle Units/inj No. Of injections Total   Trapezius BL 5 U 4 each 40 units   Cervical Paraspinals BL 5 U 2 each 20 units   Occipitalis BL 5 U 3 each 30 units   Temporalis BL 5 U 4 each 40 units    BL 5 U 1 each 10 units   Procerus 5 U 1 5 units   Frontalis BL 5 U 2 each  20 units         Total units  Waste 35 units 165     Nominal bleeding at injection sites. Patient tolerated the procedure well. No reported pain following the procedure.      MFR: [facility] AG  LOT: [id number]  Exp: 6/2023

## 2020-12-22 ENCOUNTER — PATIENT MESSAGE (OUTPATIENT)
Dept: INTERNAL MEDICINE CLINIC | Age: 55
End: 2020-12-22

## 2020-12-22 DIAGNOSIS — G43.809 OTHER MIGRAINE WITHOUT STATUS MIGRAINOSUS, NOT INTRACTABLE: ICD-10-CM

## 2020-12-22 RX ORDER — BUTALBITAL, ACETAMINOPHEN AND CAFFEINE 50; 325; 40 MG/1; MG/1; MG/1
1 TABLET ORAL
Qty: 30 TAB | Refills: 1 | Status: SHIPPED | OUTPATIENT
Start: 2020-12-22

## 2020-12-22 RX ORDER — SUMATRIPTAN 100 MG/1
TABLET, FILM COATED ORAL
Qty: 9 TAB | Refills: 3 | Status: SHIPPED | OUTPATIENT
Start: 2020-12-22 | End: 2021-04-15

## 2020-12-22 NOTE — TELEPHONE ENCOUNTER
----- Message from Jer Bahena LPN sent at 25/40/9842 12:06 PM EST -----  Regarding: FW: Non-Urgent Medical Question  Contact: [phone]    ----- Message -----  From: Parris Cava: 12/22/2020  11:49 AM EST  To: [facility] Nurse Pool  Subject: Non-Urgent Medical Question                      I have been having a migraine for several days that the [address] [place] [address] [employer] is not touching. Is there anything else that I can take that will break it before Luis M? I originally sent this to my neurologist,  but they wanted me to come back to you since [facility] the [facility] Thanks and Bhupinder Asencio!     Cara Arora

## 2021-01-05 ENCOUNTER — TELEPHONE (OUTPATIENT)
Dept: NEUROLOGY | Age: 56
End: 2021-01-05

## 2021-01-05 NOTE — TELEPHONE ENCOUNTER
Maru [phone]   to set up delivery for patient's botox medication. Spoke with Dede Allen. Was able to set up delivery for 1/26/2021. Patient's appt is 1/14/2021. Medication will arrive after patient's scheduled appt.  Was not able to move delivery any sooner

## 2021-01-12 DIAGNOSIS — G43.719 CHRONIC MIGRAINE WITHOUT AURA, INTRACTABLE, WITHOUT STATUS MIGRAINOSUS: ICD-10-CM

## 2021-01-14 RX ORDER — TOPIRAMATE 50 MG/1
CAPSULE, EXTENDED RELEASE ORAL
Qty: 30 CAP | Refills: 5 | Status: SHIPPED | OUTPATIENT
Start: 2021-01-14 | End: 2021-10-05

## 2021-01-19 DIAGNOSIS — M62.838 MUSCLE SPASM: ICD-10-CM

## 2021-01-19 RX ORDER — BACLOFEN 10 MG/1
TABLET ORAL
Qty: 90 TAB | Refills: 2 | Status: SHIPPED | OUTPATIENT
Start: 2021-01-19 | End: 2021-04-15

## 2021-01-27 RX ORDER — DULOXETIN HYDROCHLORIDE 60 MG/1
60 CAPSULE, DELAYED RELEASE ORAL DAILY
Qty: 90 CAP | Refills: 3 | Status: SHIPPED | OUTPATIENT
Start: 2021-01-27 | End: 2022-01-19

## 2021-01-27 NOTE — TELEPHONE ENCOUNTER
Requested Prescriptions     Pending Prescriptions Disp Refills    DULoxetine (CYMBALTA) 60 mg capsule 90 Cap 3     Sig: Take 1 Cap by mouth daily. Future Appointments:  Future Appointments   Date Time Provider Angelique Rodriguez   2/9/2021 11:40 AM MD WILLIAN Stinson BS AMB   3/2/2021 10:00 AM Trent Pedroza MD [facility] BS AMB        Last Appointment With Me:  9/2/2020     Requested Prescriptions     Pending Prescriptions Disp Refills    DULoxetine (CYMBALTA) 60 mg capsule 90 Cap 3     Sig: Take 1 Cap by mouth daily.

## 2021-02-04 ENCOUNTER — DOCUMENTATION ONLY (OUTPATIENT)
Dept: NEUROLOGY | Age: 56
End: 2021-02-04

## 2021-02-04 NOTE — PROGRESS NOTES
Lot# [id number]  Exp-8-2023  Botox is in the office 2-4-21 W Plasty Text: The lesion was extirpated to the level of the fat with a #15 scalpel blade.  Given the location of the defect, shape of the defect and the proximity to free margins a W-plasty was deemed most appropriate for repair.  Using a sterile surgical marker, the appropriate transposition arms of the W-plasty were drawn incorporating the defect and placing the expected incisions within the relaxed skin tension lines where possible.    The area thus outlined was incised deep to adipose tissue with a #15 scalpel blade.  The skin margins were undermined to an appropriate distance in all directions utilizing iris scissors.  The opposing transposition arms were then transposed into place in opposite direction and anchored with interrupted buried subcutaneous sutures.

## 2021-02-09 ENCOUNTER — OFFICE VISIT (OUTPATIENT)
Dept: NEUROLOGY | Age: 56
End: 2021-02-09
Payer: COMMERCIAL

## 2021-02-09 DIAGNOSIS — M62.838 MUSCLE SPASM: ICD-10-CM

## 2021-02-09 DIAGNOSIS — M79.7 FIBROMYALGIA: ICD-10-CM

## 2021-02-09 DIAGNOSIS — G43.719 CHRONIC MIGRAINE WITHOUT AURA, INTRACTABLE, WITHOUT STATUS MIGRAINOSUS: Primary | ICD-10-CM

## 2021-02-09 PROCEDURE — 99215 OFFICE O/P EST HI 40 MIN: CPT | Performed by: PSYCHIATRY & NEUROLOGY

## 2021-02-09 RX ORDER — GALCANEZUMAB 120 MG/ML
INJECTION, SOLUTION SUBCUTANEOUS
Qty: 1 ML | Refills: 12 | Status: SHIPPED | OUTPATIENT
Start: 2021-02-09 | End: 2021-08-09

## 2021-02-09 NOTE — PROGRESS NOTES
Follow-up Visit    Name Dianne Veronica Age 50 y.o. MRN [id number]  1965       Chief Complaint: Migraines    Has been disenchanted with the botox wants to try something different. Will start on emgality. Imitrex ineffective will try nurtec  Had COVID and recovered. Since recovery had a significant increase in the incidence of migraines. They do not seem to respond to the Imitrex as well as the head in the past.    Assesment and Plan  1. Chronic migraine without aura, intractable, without status migrainosus  Suffering migraines for over 10 years  Frequency of migraines prior to Botox was daily. Migraines lasted fou hours or more and were associated with photophobia and nausea. Medications tried  NSAIDs: Advil Aleve  Antidepressants: Cymbalta Prozac Celexa Zoloft Effexor  Antiepileptics tried: Topamax Lyrica amitriptyline nortriptyline Neurontin Depakote  Others tried: Excedrin, Tylenol, Fioricet, Fiorinal, Ultracet, Ultram, baclofen, Tylenol  Triptans tried: Zomig, Relpax, Imitrex, Maxalt, Axert  All the above medications failed to reduce migraine frequency to less than 15 migraines a month and she was started on  Botox which successfully reduced migraines to 10 migraines a month a more than 70 percent reduction. Nurtec sample given in the office  Stop botox  Follow up in three months    2. Fibromyalgia  Continue duloxetine        Allergies  Monistat 1 [tioconazole], Onion, Prozac [fluoxetine], Sulfa (sulfonamide antibiotics), and Sulfa dyne     Medications  Current Outpatient Medications   Medication Sig    DULoxetine (CYMBALTA) 60 mg capsule Take 1 Cap by mouth daily.  baclofen (LIORESAL) 10 mg tablet TAKE 1 TABLET BY MOUTH THREE TIMES DAILY    Trokendi XR 50 mg capsule TAKE 1 CAPSULE BY MOUTH EVERY DAY    SUMAtriptan (IMITREX) 100 mg tablet TAKE 1/2 TO 1 TABLET BY MOUTH AT ONSET OF HEADACHE.  REPEAT IN 2 HOURS AS NEEDED    butalbital-acetaminophen-caffeine (FIORICET, ESGIC) -40 mg per tablet Take 1 Tab by mouth every six (6) hours as needed for Headache.  levothyroxine (Synthroid) 75 mcg tablet TAKE 1 TABLET EVERY MORNINGON AN EMPTY STOMACH    montelukast (SINGULAIR) 10 mg tablet TAKE 1 TABLET ONCE DAILY    pregabalin (LYRICA) 100 mg capsule TAKE 1 CAPSULE BY MOUTH TWICE DAILY    ALPRAZolam (XANAX) 0.5 mg tablet TAKE 1 TABLET BY MOUTH TWICE DAILY AS NEEDED FOR ANXIETY    onabotulinumtoxinA (Botox) 200 unit injection Inject 200 Units IM into 31 FDA approved sites to face and neck. Indication Migraine prevention    multivitamin (ONE A DAY) tablet Take 1 Tab by mouth daily.  aspirin-acetaminophen-caffeine (EXCEDRIN ES) 250-250-65 mg per tablet Take 2 Tabs by mouth every eight (8) hours as needed.  omega-3 fatty acids-vitamin e (FISH OIL) 1,000 mg cap Take 1 Cap by mouth. No current facility-administered medications for this visit. Medical History  Past Medical History:   Diagnosis Date    Anxiety     Constipation     Essential hypertension     Fibromyalgia     Headache     Headache(784.0)     Hypothyroidism     Joint pain     Obstructive sleep apnea (adult) (pediatric) 5/16/2014    Sinus problem     Sleep trouble     Stress     Tiredness        Review of Systems   Eyes: Negative for blurred vision and double vision. Respiratory: Negative for cough and shortness of breath. Cardiovascular: Negative for chest pain, palpitations and orthopnea. Gastrointestinal: Negative for nausea and vomiting. Musculoskeletal: Positive for myalgias. Neurological: Positive for headaches. Negative for dizziness. Psychiatric/Behavioral: Negative for depression. The patient is nervous/anxious. Exam:      General: Well developed, well nourished.  Patient in no apparent distress   Head: Normocephalic, atraumatic, anicteric sclera   Neck Normal ROM, No thyromegally   Lungs:   Normal effort   Cardiac: Regular rate and rhythm    Ext: No pedal edema   Skin: Supple no rash     NeurologicExam:  Mental Status: Alert and oriented to person place and time   Speech: Fluent no aphasia or dysarthria. Cranial Nerves:  II - XII Intact   Motor:  Full and symmetric strength of upper and lower extremities. Normal bulk and tone. Reflexes:   Deep tendon reflexes 2+/4 and symmetric. Sensory:   Symmetric and intact with no perceived deficits . Gait:  Gait is balanced  with normal arm swing. Tremor:   No tremor noted. Cerebellar:  Coordination intact. Neurovascular: No carotid bruits.  No JVD          Lab Review  Lab Results   Component Value Date/Time    WBC 6.5 09/02/2020 10:16 AM    HCT 39.5 09/02/2020 10:16 AM    HGB 13.1 09/02/2020 10:16 AM    PLATELET 617 40/83/9985 10:16 AM       Lab Results   Component Value Date/Time    Sodium 139 09/02/2020 10:16 AM    Potassium 4.3 09/02/2020 10:16 AM    Chloride 105 09/02/2020 10:16 AM    CO2 22 09/02/2020 10:16 AM    Glucose 97 09/02/2020 10:16 AM    BUN 22 09/02/2020 10:16 AM    Creatinine 1.12 (H) 09/02/2020 10:16 AM    Calcium 9.3 09/02/2020 10:16 AM         Lab Results   Component Value Date/Time    Hemoglobin A1c 5.4 09/02/2020 10:16 AM        Lab Results   Component Value Date/Time    Vitamin B12 515 09/02/2020 10:16 AM         Lab Results   Component Value Date/Time    Cholesterol, total 206 (H) 09/02/2020 10:16 AM    HDL Cholesterol 57 09/02/2020 10:16 AM    LDL, calculated 124 (H) 09/02/2020 10:16 AM    LDL, calculated 160 (H) 09/04/2019 09:25 AM    VLDL, calculated 25 09/02/2020 10:16 AM    VLDL, calculated 21 09/04/2019 09:25 AM    Triglyceride 140 09/02/2020 10:16 AM     40 minutes spent more than 50% spent in chart review and face to face  examination, discussion of treatment options and approach

## 2021-02-12 RX ORDER — MONTELUKAST SODIUM 10 MG/1
TABLET ORAL
Qty: 30 TAB | Refills: 3 | Status: SHIPPED | OUTPATIENT
Start: 2021-02-12 | End: 2021-02-22 | Stop reason: SDUPTHER

## 2021-02-14 RX ORDER — LEVOTHYROXINE SODIUM 75 UG/1
TABLET ORAL
Qty: 30 TAB | Refills: 3 | Status: SHIPPED | OUTPATIENT
Start: 2021-02-14 | End: 2021-02-22 | Stop reason: SDUPTHER

## 2021-02-22 RX ORDER — MONTELUKAST SODIUM 10 MG/1
10 TABLET ORAL DAILY
Qty: 90 TAB | Refills: 0 | Status: SHIPPED | OUTPATIENT
Start: 2021-02-22 | End: 2021-06-07 | Stop reason: SDUPTHER

## 2021-02-22 RX ORDER — LEVOTHYROXINE SODIUM 75 UG/1
TABLET ORAL
Qty: 90 TAB | Refills: 0 | Status: SHIPPED | OUTPATIENT
Start: 2021-02-22 | End: 2021-06-06

## 2021-02-27 ENCOUNTER — TELEPHONE (OUTPATIENT)
Dept: NEUROLOGY | Age: 56
End: 2021-02-27

## 2021-02-27 NOTE — TELEPHONE ENCOUNTER
Trokendi XR 50 mg Er capsules - Approvedtoday  PA Case: [id number], Status: Approved, Coverage Starts on: 2/27/2021 12:00:00 AM, Coverage Ends on: 2/27/2022 12:00:00 AM.      Emgality     Approved today  PA Case: [id number], Status: Approved, Coverage Starts on: 2/27/2021 12:00:00 AM, Coverage Ends on: 2/27/2022 12:00:00 AM.    [facility] [place]

## 2021-03-02 ENCOUNTER — VIRTUAL VISIT (OUTPATIENT)
Dept: INTERNAL MEDICINE CLINIC | Age: 56
End: 2021-03-02
Payer: COMMERCIAL

## 2021-03-02 DIAGNOSIS — F32.A ANXIETY AND DEPRESSION: ICD-10-CM

## 2021-03-02 DIAGNOSIS — I10 ESSENTIAL HYPERTENSION: ICD-10-CM

## 2021-03-02 DIAGNOSIS — H93.19 TINNITUS, UNSPECIFIED LATERALITY: Primary | ICD-10-CM

## 2021-03-02 DIAGNOSIS — E66.01 SEVERE OBESITY (BMI 35.0-39.9) WITH COMORBIDITY (HCC): ICD-10-CM

## 2021-03-02 DIAGNOSIS — H91.93: ICD-10-CM

## 2021-03-02 DIAGNOSIS — M72.2 PLANTAR FASCIITIS: ICD-10-CM

## 2021-03-02 DIAGNOSIS — G43.109 MIGRAINE WITH AURA AND WITHOUT STATUS MIGRAINOSUS, NOT INTRACTABLE: ICD-10-CM

## 2021-03-02 DIAGNOSIS — E03.9 ACQUIRED HYPOTHYROIDISM: ICD-10-CM

## 2021-03-02 DIAGNOSIS — F41.9 ANXIETY AND DEPRESSION: ICD-10-CM

## 2021-03-02 DIAGNOSIS — M79.7 FIBROMYALGIA: ICD-10-CM

## 2021-03-02 PROCEDURE — 99214 OFFICE O/P EST MOD 30 MIN: CPT | Performed by: FAMILY MEDICINE

## 2021-03-02 NOTE — PROGRESS NOTES
Jurgen Mckenzie is a 50 y.o. female who presents for follow-up on medications. Last seen in September. When last seen we reduce the Celexa and stopped. Started Cymbalta 60 mg daily. Reports feels \"about the same\". Has FM. On lyrica 100mg BID. On cymbalta 60mg daily. Helpful with FM pain. Since last visit she has had a follow-up with Dr Shelli Patrick, neurology. She receives Botox injections for her migraines, she stopped this past month, they stopped helping. Given emgality injections. Has used imitrex, helpful at times, given  prescription for Fioricet, helpful and used infrequently  She is also on Trokendi XR 50 mg daily. On lyrica 100mg BID. Had COVID, no residual symptoms. Reports tinnitus, ongoing. Has not had a hearing test.      Has plantar fasciitis. Going exercises and to start dorsiflexion splint. Treated for hypothyroidism. Stable on medication. TSH at goal Sept 2020.          This is an established visit conducted via telemedicine with video. The patient has been instructed that this meets HIPAA criteria and acknowledges and agrees to this method of visitation. Pursuant to the emergency declaration under the [facility], [facility]5 waiver authority and the [employer] Act, this Virtual Visit was conducted, with patient's consent, to reduce the patient's risk of exposure to COVID-19 and provide continuity of care for an established patient. Services were provided through a video synchronous discussion virtually to substitute for in-person clinic visit.         Past Medical History:   Diagnosis Date    Anxiety     Constipation     Essential hypertension     Fibromyalgia     Headache     Headache(784.0)     Hypothyroidism     Joint pain     Obstructive sleep apnea (adult) (pediatric) 5/16/2014    Sinus problem     Sleep trouble     Stress     Tiredness        Family History   Problem Relation Age of Onset    Diabetes Father     Hypertension Father     Cancer Mother         skin    Cancer Maternal Aunt     Cancer Maternal Uncle     Diabetes Maternal Uncle     Diabetes Paternal Aunt     Diabetes Maternal Grandmother     Parkinsonism Maternal Grandmother     Other Paternal Grandmother         amylodosis       Social History     Socioeconomic History    Marital status:      Spouse name: Not on file    Number of children: Not on file    Years of education: Not on file    Highest education level: Not on file   Occupational History    Not on file   Social Needs    Financial resource strain: Not on file    Food insecurity     Worry: Not on file     Inability: Not on file   [employer] needs     Medical: Not on file     Non-medical: Not on file   Tobacco Use    Smoking status: Former Smoker    Smokeless tobacco: Never Used   Substance and Sexual Activity    Alcohol use: Yes     Comment: social    Drug use: No    Sexual activity: Not Currently     Partners: Male     Birth control/protection: None   Lifestyle    Physical activity     Days per week: Not on file     Minutes per session: Not on file    Stress: Not on file   Relationships    Social connections     Talks on phone: Not on file     Gets together: Not on file     Attends Quaker service: Not on file     Active member of club or organization: Not on file     Attends meetings of clubs or organizations: Not on file     Relationship status: Not on file    Intimate partner violence     Fear of current or ex partner: Not on file     Emotionally abused: Not on file     Physically abused: Not on file     Forced sexual activity: Not on file   Other Topics Concern    Not on file   Social History Narrative    Not on file       Current Outpatient Medications on File Prior to Visit   Medication Sig Dispense Refill    montelukast (SINGULAIR) 10 mg tablet Take 1 Tab by mouth daily.  90 Tab 0    levothyroxine (Synthroid) 75 mcg tablet TAKE 1 TABLET EVERY MORNINGON AN EMPTY STOMACH 90 Tab 0    Emgality Pen 120 mg/mL injection Take 240mg first month than 120mg every month there after. 1 mL 12    DULoxetine (CYMBALTA) 60 mg capsule Take 1 Cap by mouth daily. 90 Cap 3    baclofen (LIORESAL) 10 mg tablet TAKE 1 TABLET BY MOUTH THREE TIMES DAILY 90 Tab 2    Trokendi XR 50 mg capsule TAKE 1 CAPSULE BY MOUTH EVERY DAY 30 Cap 5    SUMAtriptan (IMITREX) 100 mg tablet TAKE 1/2 TO 1 TABLET BY MOUTH AT ONSET OF HEADACHE. REPEAT IN 2 HOURS AS NEEDED 9 Tab 3    butalbital-acetaminophen-caffeine (FIORICET, ESGIC) -40 mg per tablet Take 1 Tab by mouth every six (6) hours as needed for Headache. 30 Tab 1    pregabalin (LYRICA) 100 mg capsule TAKE 1 CAPSULE BY MOUTH TWICE DAILY 60 Cap 5    ALPRAZolam (XANAX) 0.5 mg tablet TAKE 1 TABLET BY MOUTH TWICE DAILY AS NEEDED FOR ANXIETY 30 Tab 0    onabotulinumtoxinA (Botox) 200 unit injection Inject 200 Units IM into 31 FDA approved sites to face and neck. Indication Migraine prevention 1 Vial 3    multivitamin (ONE A DAY) tablet Take 1 Tab by mouth daily.  aspirin-acetaminophen-caffeine (EXCEDRIN ES) 250-250-65 mg per tablet Take 2 Tabs by mouth every eight (8) hours as needed.  omega-3 fatty acids-vitamin e (FISH OIL) 1,000 mg cap Take 1 Cap by mouth. No current facility-administered medications on file prior to visit. Review of Systems  Pertinent items are noted in HPI. Objective:     Gen: well appearing female  HEENT: normal conjunctiva, no audible congestion, patient does not see oral erythema, has MMM  Neck: patient does not feel enlarged or tender LAD or masses  Resp: normal respiratory effort, no audible wheezing. CV: patient does not feel palpitations or heart irregularity  Abd: patient does not feel abdominal tenderness or mass, patient does not notice distension  Extrem: patient does not see swelling in ankles or joints.    Neuro: Alert and oriented, able to answer questions without difficulty, able to move all extremities and walk normally        Assessment/Plan:       ICD-10-CM ICD-9-CM    1. Tinnitus, unspecified laterality  H93.19 388.30 REFERRAL TO AUDIOLOGY   2. Hearing-related symptom, bilateral  H91.93 389.9 REFERRAL TO AUDIOLOGY   3. Severe obesity (BMI 35.0-39. 9) with comorbidity ([facility] [address]  E66.01 278.01    4. Migraine with aura and without status migrainosus, not intractable  G43.109 346.00    5. Essential hypertension  I10 401.9    6. Chronic migraine  G43.709 346.70    7. Acquired hypothyroidism  E03.9 244.9    8. Fibromyalgia  M79.7 729.1    9. Anxiety and depression  F41.9 300.00     F32.9 311    10. Plantar fasciitis  M72.2 728.71        This was a telemedicine visit with video. María Rios MD    Follow-up and Dispositions    · Return in about 6 months (around 9/2/2021) for annual and fasting labs. Lnida Miner

## 2021-03-06 DIAGNOSIS — M79.7 FIBROMYALGIA: ICD-10-CM

## 2021-03-07 RX ORDER — PREGABALIN 100 MG/1
CAPSULE ORAL
Qty: 60 CAP | Refills: 5 | Status: SHIPPED | OUTPATIENT
Start: 2021-03-07 | End: 2021-09-02 | Stop reason: SDUPTHER

## 2021-04-07 ENCOUNTER — TELEPHONE (OUTPATIENT)
Dept: INTERNAL MEDICINE CLINIC | Age: 56
End: 2021-04-07

## 2021-04-07 DIAGNOSIS — M72.2 PLANTAR FASCIITIS: Primary | ICD-10-CM

## 2021-04-07 NOTE — TELEPHONE ENCOUNTER
----- Message from [facility] sent at 4/7/2021  8:13 AM EDT -----  Regarding: FW: Referral Request  Contact: [phone]    ----- Message -----  From: Martin Cruz: 4/6/2021   7:36 PM EDT  To: [facility] Nurse Pool  Subject: Referral Request                                 I have been exercising my foot that I am having the plantar fasciitis issues with and it's not getting any better. Can you refer me to a podiatrist or someone to help me get some relief from this pain? ?    Thanks,    Ming Stroud

## 2021-04-15 DIAGNOSIS — G43.719 CHRONIC MIGRAINE WITHOUT AURA, INTRACTABLE, WITHOUT STATUS MIGRAINOSUS: ICD-10-CM

## 2021-04-15 DIAGNOSIS — G43.809 OTHER MIGRAINE WITHOUT STATUS MIGRAINOSUS, NOT INTRACTABLE: ICD-10-CM

## 2021-04-15 DIAGNOSIS — M62.838 MUSCLE SPASM: ICD-10-CM

## 2021-04-15 RX ORDER — BACLOFEN 10 MG/1
TABLET ORAL
Qty: 90 TAB | Refills: 2 | Status: SHIPPED | OUTPATIENT
Start: 2021-04-15 | End: 2021-07-13

## 2021-04-15 RX ORDER — SUMATRIPTAN 100 MG/1
TABLET, FILM COATED ORAL
Qty: 9 TAB | Refills: 3 | Status: SHIPPED | OUTPATIENT
Start: 2021-04-15 | End: 2021-08-12

## 2021-04-16 RX ORDER — ONABOTULINUMTOXINA 200 [USP'U]/1
INJECTION, POWDER, LYOPHILIZED, FOR SOLUTION INTRADERMAL; INTRAMUSCULAR
Qty: 1 VIAL | Refills: 3 | Status: SHIPPED | OUTPATIENT
Start: 2021-04-16 | End: 2022-06-22

## 2021-04-27 ENCOUNTER — TELEPHONE (OUTPATIENT)
Dept: NEUROLOGY | Age: 56
End: 2021-04-27

## 2021-04-27 NOTE — TELEPHONE ENCOUNTER
Botox renewal approval - called in to [address] Approved under medical benefit (as previously done as well). [facility] added to: Javier Hamilton [id number]   and [id number]  4/27/21 - 4/26/22    Emailed Junaid Lewis at [facility] to process ASAP.   - This already been escribed to the correct SPP - [facility]/TIARA Peacock.

## 2021-04-28 ENCOUNTER — TELEPHONE (OUTPATIENT)
Dept: NEUROLOGY | Age: 56
End: 2021-04-28

## 2021-04-28 NOTE — TELEPHONE ENCOUNTER
Re:  Botox     Message rec'd from Cely/[facility]:     [id number]  Mike Martinez 1965  Hi  I do show patient is active and last shipped 2/3/2021 and is a refill patient. Please contact team to schedule shipment. And I replied back:     Good morning! Could you please forward to the 'team' to set up the shipment and if they need to call us to verify the date and delivery address - please give them my direct dial [phone]. Many thanks! From: Antony He ([facility] Team)\" [email or web address]   Date: Tue Apr 27 15:07:20 EDT 2021   To: Carleen THOMPSON\" [email or web address]   Subject: RE: secure mail

## 2021-05-03 ENCOUNTER — TELEPHONE (OUTPATIENT)
Dept: NEUROLOGY | Age: 56
End: 2021-05-03

## 2021-05-03 NOTE — TELEPHONE ENCOUNTER
Re: Botox     Based on Cely's message on 4/27 - she is not processing it directly. Mo,     You will need to call [facility] to set up the delivery.

## 2021-05-05 ENCOUNTER — DOCUMENTATION ONLY (OUTPATIENT)
Dept: NEUROLOGY | Age: 56
End: 2021-05-05

## 2021-05-05 DIAGNOSIS — F41.9 ANXIETY: ICD-10-CM

## 2021-05-05 NOTE — PROGRESS NOTES
botox came in the office: 5/5/2021  MFR: alice  LOT: [id number]  Exp: 10/31/2023  Appointment: 5/6/21  Provider: Haseeb Guzmán   Specialty [facility]: [facility]

## 2021-05-05 NOTE — TELEPHONE ENCOUNTER
----- Message from Burak Denny sent at 5/5/2021 12:06 PM EDT -----  Regarding: Prescription Question  Contact: [phone]  Can you refill the generic Xanax? Mother's Day  and the next couple months are going to be extremely difficult as my daughter has pulled out of our lives and taken our grandchildren as well. I've been trying to call with it on my own for the past two weeks but I'm not being very successful. Thanks!

## 2021-05-06 RX ORDER — ALPRAZOLAM 0.5 MG/1
TABLET ORAL
Qty: 30 TAB | Refills: 0 | Status: SHIPPED | OUTPATIENT
Start: 2021-05-06

## 2021-05-06 RX ORDER — ALPRAZOLAM 0.5 MG/1
TABLET ORAL
Qty: 30 TAB | Refills: 0 | OUTPATIENT
Start: 2021-05-06

## 2021-06-06 RX ORDER — LEVOTHYROXINE SODIUM 75 UG/1
TABLET ORAL
Qty: 30 TABLET | Refills: 3 | Status: SHIPPED | OUTPATIENT
Start: 2021-06-06 | End: 2021-06-16 | Stop reason: SDUPTHER

## 2021-06-07 RX ORDER — MONTELUKAST SODIUM 10 MG/1
10 TABLET ORAL DAILY
Qty: 90 TABLET | Refills: 0 | Status: SHIPPED | OUTPATIENT
Start: 2021-06-07 | End: 2021-09-03

## 2021-06-16 RX ORDER — LEVOTHYROXINE SODIUM 75 UG/1
TABLET ORAL
Qty: 30 TABLET | Refills: 3 | Status: SHIPPED | OUTPATIENT
Start: 2021-06-16 | End: 2021-10-04

## 2021-07-13 DIAGNOSIS — M62.838 MUSCLE SPASM: ICD-10-CM

## 2021-07-13 RX ORDER — BACLOFEN 10 MG/1
TABLET ORAL
Qty: 90 TABLET | Refills: 2 | Status: SHIPPED | OUTPATIENT
Start: 2021-07-13 | End: 2021-10-11 | Stop reason: SDUPTHER

## 2021-08-09 RX ORDER — GALCANEZUMAB 120 MG/ML
INJECTION, SOLUTION SUBCUTANEOUS
Qty: 1 ML | Refills: 12 | Status: SHIPPED | OUTPATIENT
Start: 2021-08-09 | End: 2022-03-08 | Stop reason: SDUPTHER

## 2021-08-10 ENCOUNTER — TRANSCRIBE ORDER (OUTPATIENT)
Dept: SCHEDULING | Age: 56
End: 2021-08-10

## 2021-08-10 DIAGNOSIS — Z12.31 SCREENING MAMMOGRAM FOR HIGH-RISK PATIENT: Primary | ICD-10-CM

## 2021-08-12 DIAGNOSIS — G43.809 OTHER MIGRAINE WITHOUT STATUS MIGRAINOSUS, NOT INTRACTABLE: ICD-10-CM

## 2021-08-12 RX ORDER — SUMATRIPTAN 100 MG/1
TABLET, FILM COATED ORAL
Qty: 9 TABLET | Refills: 3 | Status: SHIPPED | OUTPATIENT
Start: 2021-08-12 | End: 2022-03-05

## 2021-09-02 DIAGNOSIS — M79.7 FIBROMYALGIA: ICD-10-CM

## 2021-09-02 RX ORDER — PREGABALIN 100 MG/1
100 CAPSULE ORAL 2 TIMES DAILY
Qty: 60 CAPSULE | Refills: 5 | Status: SHIPPED | OUTPATIENT
Start: 2021-09-02 | End: 2022-03-04 | Stop reason: SDUPTHER

## 2021-09-03 RX ORDER — MONTELUKAST SODIUM 10 MG/1
TABLET ORAL
Qty: 90 TABLET | Refills: 0 | Status: SHIPPED | OUTPATIENT
Start: 2021-09-03 | End: 2021-12-02

## 2021-09-15 ENCOUNTER — HOSPITAL ENCOUNTER (OUTPATIENT)
Dept: MAMMOGRAPHY | Age: 56
Discharge: HOME | End: 2021-09-15
Attending: FAMILY MEDICINE
Payer: COMMERCIAL

## 2021-09-15 DIAGNOSIS — Z12.31 SCREENING MAMMOGRAM FOR HIGH-RISK PATIENT: ICD-10-CM

## 2021-09-15 PROCEDURE — 77063 BREAST TOMOSYNTHESIS BI: CPT

## 2021-09-30 DIAGNOSIS — G43.719 CHRONIC MIGRAINE WITHOUT AURA, INTRACTABLE, WITHOUT STATUS MIGRAINOSUS: ICD-10-CM

## 2021-10-04 RX ORDER — LEVOTHYROXINE SODIUM 75 UG/1
TABLET ORAL
Qty: 30 TABLET | Refills: 3 | Status: SHIPPED | OUTPATIENT
Start: 2021-10-04 | End: 2021-10-11 | Stop reason: SDUPTHER

## 2021-10-05 RX ORDER — TOPIRAMATE 50 MG/1
CAPSULE, EXTENDED RELEASE ORAL
Qty: 30 CAPSULE | Refills: 5 | Status: SHIPPED | OUTPATIENT
Start: 2021-10-05 | End: 2022-03-08 | Stop reason: SDUPTHER

## 2021-10-11 DIAGNOSIS — M62.838 MUSCLE SPASM: ICD-10-CM

## 2021-10-11 RX ORDER — LEVOTHYROXINE SODIUM 75 UG/1
TABLET ORAL
Qty: 30 TABLET | Refills: 3 | Status: SHIPPED | OUTPATIENT
Start: 2021-10-11 | End: 2022-02-01

## 2021-10-11 RX ORDER — BACLOFEN 10 MG/1
10 TABLET ORAL 3 TIMES DAILY
Qty: 90 TABLET | Refills: 2 | Status: SHIPPED | OUTPATIENT
Start: 2021-10-11 | End: 2022-01-10

## 2021-10-11 NOTE — TELEPHONE ENCOUNTER
Future Appointments:  Future Appointments   Date Time Provider Angelique Rodriguez   11/10/2021 12:00 PM Coty Romeo MD [facility] BS AMB        Last Appointment With Me:  Visit date not found     Requested Prescriptions     Pending Prescriptions Disp Refills    baclofen (LIORESAL) 10 mg tablet 90 Tablet 2     Sig: Take 1 Tablet by mouth three (3) times daily.

## 2021-11-10 ENCOUNTER — OFFICE VISIT (OUTPATIENT)
Dept: INTERNAL MEDICINE CLINIC | Age: 56
End: 2021-11-10
Payer: COMMERCIAL

## 2021-11-10 VITALS
DIASTOLIC BLOOD PRESSURE: 79 MMHG | WEIGHT: 217 LBS | OXYGEN SATURATION: 98 % | SYSTOLIC BLOOD PRESSURE: 120 MMHG | HEART RATE: 71 BPM | BODY MASS INDEX: 37.05 KG/M2 | HEIGHT: 64 IN | RESPIRATION RATE: 16 BRPM | TEMPERATURE: 97.4 F

## 2021-11-10 DIAGNOSIS — F41.9 ANXIETY AND DEPRESSION: ICD-10-CM

## 2021-11-10 DIAGNOSIS — E66.01 SEVERE OBESITY (BMI 35.0-39.9) WITH COMORBIDITY (HCC): ICD-10-CM

## 2021-11-10 DIAGNOSIS — I10 ESSENTIAL HYPERTENSION: ICD-10-CM

## 2021-11-10 DIAGNOSIS — Z11.59 NEED FOR HEPATITIS C SCREENING TEST: ICD-10-CM

## 2021-11-10 DIAGNOSIS — E03.9 ACQUIRED HYPOTHYROIDISM: ICD-10-CM

## 2021-11-10 DIAGNOSIS — Z00.00 ROUTINE MEDICAL EXAM: Primary | ICD-10-CM

## 2021-11-10 DIAGNOSIS — G43.109 MIGRAINE WITH AURA AND WITHOUT STATUS MIGRAINOSUS, NOT INTRACTABLE: ICD-10-CM

## 2021-11-10 DIAGNOSIS — E55.9 VITAMIN D DEFICIENCY: ICD-10-CM

## 2021-11-10 DIAGNOSIS — F32.A ANXIETY AND DEPRESSION: ICD-10-CM

## 2021-11-10 DIAGNOSIS — M79.7 FIBROMYALGIA: ICD-10-CM

## 2021-11-10 PROCEDURE — 99396 PREV VISIT EST AGE 40-64: CPT | Performed by: FAMILY MEDICINE

## 2021-11-10 NOTE — PROGRESS NOTES
Jimi Ramos is a 49 y.o. female who presents for annual exam.    Last seen March 2 with tinnitus. Ongoing, no worsening. Was told to get hearing aides, too expensive. Using an yee to cancel noise. Patient struggled with weight gain. Weight September 2020 224#. Today 217#. Prior phentermine discontinued due to anxiety. Has been followed by Dr. Reny Chowdhury, neurology, for migraine. Prior Botox treatment. Given Emgality and sample of Nurtec at last visit with neurology in February 2021. Due to cost Nurtec was changed back to Imitrex 100 mg currently on Trokendi XR 50 mg daily. History of fibromyalgia. On Lyrica 100 mg twice daily baclofen as needed. Going to a stretch therapist. On Cymbalta 60 mg daily. Off celexa. Mood is stable. Treated for hypothyroidism on levothyroxine 75 mcg daily.       Past Medical History:   Diagnosis Date    Anxiety     Constipation     Essential hypertension     Fibromyalgia     Headache     Headache(784.0)     Hypothyroidism     Joint pain     Obstructive sleep apnea (adult) (pediatric) 5/16/2014    Sinus problem     Sleep trouble     Stress     Tiredness        Family History   Problem Relation Age of Onset    Diabetes Father     Hypertension Father     Cancer Mother         skin    Cancer Maternal Aunt     Cancer Maternal Uncle     Diabetes Maternal Uncle     Diabetes Paternal Aunt     Diabetes Maternal Grandmother     Parkinsonism Maternal Grandmother     Other Paternal Grandmother         amylodosis       Social History     Socioeconomic History    Marital status:      Spouse name: Not on file    Number of children: Not on file    Years of education: Not on file    Highest education level: Not on file   Occupational History    Not on file   Tobacco Use    Smoking status: Former Smoker    Smokeless tobacco: Never Used   Substance and Sexual Activity    Alcohol use: Yes     Comment: social    Drug use: No    Sexual activity: Not Currently     Partners: Male     Birth control/protection: None   Other Topics Concern    Not on file   Social History Narrative    Not on file     Social Determinants of Health     Financial Resource Strain:     Difficulty of Paying Living Expenses: Not on file   Food Insecurity:     Worried About Running Out of Food in the Last Year: Not on file    Petrona of Food in the Last Year: Not on file   Transportation Needs:     Lack of Transportation (Medical): Not on file    Lack of Transportation (Non-Medical): Not on file   Physical Activity:     Days of Exercise per Week: Not on file    Minutes of Exercise per Session: Not on file   Stress:     Feeling of Stress : Not on file   Social Connections:     Frequency of Communication with Friends and Family: Not on file    Frequency of Social Gatherings with Friends and Family: Not on file    Attends Christian Services: Not on file    Active Member of [address] [place] [address] [employer] or Organizations: Not on file    Attends Club or Organization Meetings: Not on file    Marital Status: Not on file   Intimate Partner Violence:     Fear of Current or Ex-Partner: Not on file    Emotionally Abused: Not on file    Physically Abused: Not on file    Sexually Abused: Not on file   Housing Stability:     Unable to Pay for Housing in the Last Year: Not on file    Number of [place] in the Last Year: Not on file    Unstable Housing in the Last Year: Not on file       Current Outpatient Medications on File Prior to Visit   Medication Sig Dispense Refill    baclofen (LIORESAL) 10 mg tablet Take 1 Tablet by mouth three (3) times daily. 90 Tablet 2    levothyroxine (Synthroid) 75 mcg tablet TAKE 1 TABLET EVERY MORNINGON AN EMPTY STOMACH 30 Tablet 3    Trokendi XR 50 mg capsule TAKE 1 CAPSULE BY MOUTH EVERY DAY 30 Capsule 5    montelukast (SINGULAIR) 10 mg tablet TAKE 1 TABLET DAILY 90 Tablet 0    pregabalin (LYRICA) 100 mg capsule Take 1 Capsule by mouth two (2) times a day.  61 Capsule 5    SUMAtriptan (IMITREX) 100 mg tablet TAKE 1/2 TO 1 TABLET BY MOUTH AT ONSET OF HEADACHE. REPEAT IN 2 HOURS AS NEEDED 9 Tablet 3    Emgality Pen 120 mg/mL injection INJECT 240MG UNDER THE SKIN THE FIRST MONTH, THEN 120MG EVERY MONTH THEREAFTER 1 mL 12    ALPRAZolam (XANAX) 0.5 mg tablet TAKE 1 TABLET BY MOUTH TWICE DAILY AS NEEDED FOR ANXIETY 30 Tab 0    DULoxetine (CYMBALTA) 60 mg capsule Take 1 Cap by mouth daily. 90 Cap 3    butalbital-acetaminophen-caffeine (FIORICET, ESGIC) -40 mg per tablet Take 1 Tab by mouth every six (6) hours as needed for Headache. 30 Tab 1    multivitamin (ONE A DAY) tablet Take 1 Tab by mouth daily.  aspirin-acetaminophen-caffeine (EXCEDRIN ES) 250-250-65 mg per tablet Take 2 Tabs by mouth every eight (8) hours as needed.  omega-3 fatty acids-vitamin e (FISH OIL) 1,000 mg cap Take 1 Cap by mouth.  onabotulinumtoxinA (Botox) 200 unit injection FOR . PHYSICIAN TO INJECT UP  UNITS INTRAMUSCULARLY INTO HEAD, FACE AND NECK AREA EVERY 80 DAYS. (Patient not taking: Reported on 11/10/2021) 1 Vial 3     No current facility-administered medications on file prior to visit. Review of Systems  Pertinent items are noted in HPI. Objective:     Visit Vitals  /79 (BP 1 Location: Left arm, BP Patient Position: Sitting, BP Cuff Size: Adult)   Pulse 71   Temp 97.4 °F (36.3 °C) (Temporal)   Resp 16   Ht 5' 4\" (1.626 m)   Wt 217 lb (98.4 kg)   SpO2 98%   BMI 37.25 kg/m²     Gen: well appearing female  HEENT:   PERRL,normal conjunctiva. External ear and canals normal, TMs no opacification or erythema,  OP no erythema, no exudates, MMM  Neck:  Supple. Thyroid normal size, nontender, without nodules. No masses or LAD  Resp:  No wheezing, no rhonchi, no rales. CV:  RRR, normal S1S2, no murmur. GI: soft, nontender, without masses. No hepatosplenomegaly.   Extrem:  +2 pulses, no edema, warm distally      Assessment/Plan: ICD-10-CM ICD-9-CM    1. Routine medical exam  A65.78 M09.6 METABOLIC PANEL, COMPREHENSIVE      CBC W/O DIFF      HEMOGLOBIN A1C WITH EAG      LIPID PANEL      VITAMIN D, 25 HYDROXY      URINALYSIS W/ RFLX MICROSCOPIC      TSH 3RD GENERATION      METABOLIC PANEL, COMPREHENSIVE      CBC W/O DIFF      HEMOGLOBIN A1C WITH EAG      LIPID PANEL      VITAMIN D, 25 HYDROXY      URINALYSIS W/ RFLX MICROSCOPIC      TSH 3RD GENERATION   2. Severe obesity (BMI 35.0-39. 9) with comorbidity ([facility] [address]  E66.01 278.01    3. Essential hypertension  K42 958.7 METABOLIC PANEL, COMPREHENSIVE      CBC W/O DIFF      METABOLIC PANEL, COMPREHENSIVE      CBC W/O DIFF   4. Migraine with aura and without status migrainosus, not intractable  G43.109 346.00    5. Acquired hypothyroidism  E03.9 244.9 TSH 3RD GENERATION      TSH 3RD GENERATION   6. Anxiety and depression  F41.9 300.00     F32. A 311    7. Fibromyalgia  M79.7 729.1    8. Vitamin D deficiency  E55.9 268.9 VITAMIN D, 25 HYDROXY      VITAMIN D, 25 HYDROXY   9. Need for hepatitis C screening test  Z11.59 V73.89 HCV AB W/RFLX TO JIHAN      HCV AB W/RFLX TO JIHAN     Recommend heart healthy diet and regular cardiovascular exercise.          Amalia Mustafa MD

## 2021-11-11 LAB
25(OH)D3+25(OH)D2 SERPL-MCNC: 40.4 NG/ML (ref 30–100)
ALBUMIN SERPL-MCNC: 4.5 G/DL (ref 3.8–4.9)
ALBUMIN/GLOB SERPL: 1.5 {RATIO} (ref 1.2–2.2)
ALP SERPL-CCNC: 143 IU/L (ref 44–121)
ALT SERPL-CCNC: 19 IU/L (ref 0–32)
APPEARANCE UR: CLEAR
AST SERPL-CCNC: 17 IU/L (ref 0–40)
BILIRUB SERPL-MCNC: 0.3 MG/DL (ref 0–1.2)
BILIRUB UR QL STRIP: NEGATIVE
BUN SERPL-MCNC: 27 MG/DL (ref 6–24)
BUN/CREAT SERPL: 21 (ref 9–23)
CALCIUM SERPL-MCNC: 9.5 MG/DL (ref 8.7–10.2)
CHLORIDE SERPL-SCNC: 110 MMOL/L (ref 96–106)
CHOLEST SERPL-MCNC: 273 MG/DL (ref 100–199)
CO2 SERPL-SCNC: 21 MMOL/L (ref 20–29)
COLOR UR: YELLOW
CREAT SERPL-MCNC: 1.28 MG/DL (ref 0.57–1)
ERYTHROCYTE [DISTWIDTH] IN BLOOD BY AUTOMATED COUNT: 12.3 % (ref 11.7–15.4)
EST. AVERAGE GLUCOSE BLD GHB EST-MCNC: 117 MG/DL
GLOBULIN SER CALC-MCNC: 3.1 G/DL (ref 1.5–4.5)
GLUCOSE SERPL-MCNC: 102 MG/DL (ref 65–99)
GLUCOSE UR QL: NEGATIVE
HBA1C MFR BLD: 5.7 % (ref 4.8–5.6)
HCT VFR BLD AUTO: 42.3 % (ref 34–46.6)
HCV AB S/CO SERPL IA: <0.1 S/CO RATIO (ref 0–0.9)
HCV AB SERPL QL IA: NORMAL
HDLC SERPL-MCNC: 63 MG/DL
HGB BLD-MCNC: 14 G/DL (ref 11.1–15.9)
HGB UR QL STRIP: NEGATIVE
KETONES UR QL STRIP: NEGATIVE
LDLC SERPL CALC-MCNC: 191 MG/DL (ref 0–99)
LEUKOCYTE ESTERASE UR QL STRIP: NEGATIVE
MCH RBC QN AUTO: 30.6 PG (ref 26.6–33)
MCHC RBC AUTO-ENTMCNC: 33.1 G/DL (ref 31.5–35.7)
MCV RBC AUTO: 93 FL (ref 79–97)
MICRO URNS: NORMAL
NITRITE UR QL STRIP: NEGATIVE
PH UR STRIP: 6.5 [PH] (ref 5–7.5)
PLATELET # BLD AUTO: 227 X10E3/UL (ref 150–450)
POTASSIUM SERPL-SCNC: 4.6 MMOL/L (ref 3.5–5.2)
PROT SERPL-MCNC: 7.6 G/DL (ref 6–8.5)
PROT UR QL STRIP: NORMAL
RBC # BLD AUTO: 4.57 X10E6/UL (ref 3.77–5.28)
SODIUM SERPL-SCNC: 144 MMOL/L (ref 134–144)
SP GR UR: 1.03 (ref 1–1.03)
TRIGL SERPL-MCNC: 107 MG/DL (ref 0–149)
TSH SERPL DL<=0.005 MIU/L-ACNC: 1.25 UIU/ML (ref 0.45–4.5)
UROBILINOGEN UR STRIP-MCNC: 1 MG/DL (ref 0.2–1)
VLDLC SERPL CALC-MCNC: 19 MG/DL (ref 5–40)
WBC # BLD AUTO: 7 X10E3/UL (ref 3.4–10.8)

## 2021-12-02 RX ORDER — MONTELUKAST SODIUM 10 MG/1
TABLET ORAL
Qty: 90 TABLET | Refills: 0 | Status: SHIPPED | OUTPATIENT
Start: 2021-12-02 | End: 2022-03-02

## 2022-01-10 DIAGNOSIS — M62.838 MUSCLE SPASM: ICD-10-CM

## 2022-01-10 RX ORDER — BACLOFEN 10 MG/1
TABLET ORAL
Qty: 90 TABLET | Refills: 0 | Status: SHIPPED | OUTPATIENT
Start: 2022-01-10 | End: 2022-02-07

## 2022-01-19 RX ORDER — DULOXETIN HYDROCHLORIDE 60 MG/1
CAPSULE, DELAYED RELEASE ORAL
Qty: 90 CAPSULE | Refills: 3 | Status: SHIPPED | OUTPATIENT
Start: 2022-01-19

## 2022-02-01 RX ORDER — LEVOTHYROXINE SODIUM 75 UG/1
TABLET ORAL
Qty: 30 TABLET | Refills: 3 | Status: SHIPPED | OUTPATIENT
Start: 2022-02-01 | End: 2022-05-25

## 2022-02-07 DIAGNOSIS — M62.838 MUSCLE SPASM: ICD-10-CM

## 2022-02-07 RX ORDER — BACLOFEN 10 MG/1
TABLET ORAL
Qty: 90 TABLET | Refills: 0 | Status: SHIPPED | OUTPATIENT
Start: 2022-02-07 | End: 2022-03-03

## 2022-03-02 DIAGNOSIS — G43.809 OTHER MIGRAINE WITHOUT STATUS MIGRAINOSUS, NOT INTRACTABLE: ICD-10-CM

## 2022-03-02 DIAGNOSIS — M62.838 MUSCLE SPASM: ICD-10-CM

## 2022-03-02 RX ORDER — MONTELUKAST SODIUM 10 MG/1
TABLET ORAL
Qty: 90 TABLET | Refills: 0 | Status: SHIPPED | OUTPATIENT
Start: 2022-03-02 | End: 2022-05-25

## 2022-03-03 RX ORDER — BACLOFEN 10 MG/1
TABLET ORAL
Qty: 90 TABLET | Refills: 0 | Status: SHIPPED | OUTPATIENT
Start: 2022-03-03 | End: 2022-04-19

## 2022-03-04 DIAGNOSIS — M79.7 FIBROMYALGIA: ICD-10-CM

## 2022-03-04 RX ORDER — PREGABALIN 100 MG/1
100 CAPSULE ORAL 2 TIMES DAILY
Qty: 60 CAPSULE | Refills: 5 | Status: SHIPPED | OUTPATIENT
Start: 2022-03-04 | End: 2022-03-11 | Stop reason: SDUPTHER

## 2022-03-05 RX ORDER — SUMATRIPTAN 100 MG/1
TABLET, FILM COATED ORAL
Qty: 9 TABLET | Refills: 0 | Status: SHIPPED | OUTPATIENT
Start: 2022-03-05 | End: 2022-04-19

## 2022-03-08 DIAGNOSIS — G43.719 CHRONIC MIGRAINE WITHOUT AURA, INTRACTABLE, WITHOUT STATUS MIGRAINOSUS: ICD-10-CM

## 2022-03-08 RX ORDER — TOPIRAMATE 50 MG/1
50 CAPSULE, EXTENDED RELEASE ORAL DAILY
Qty: 30 CAPSULE | Refills: 11 | Status: SHIPPED | OUTPATIENT
Start: 2022-03-08

## 2022-03-08 RX ORDER — GALCANEZUMAB 120 MG/ML
INJECTION, SOLUTION SUBCUTANEOUS
Qty: 1 ML | Refills: 12 | Status: SHIPPED | OUTPATIENT
Start: 2022-03-08 | End: 2022-03-30 | Stop reason: SDUPTHER

## 2022-03-11 DIAGNOSIS — M79.7 FIBROMYALGIA: ICD-10-CM

## 2022-03-11 RX ORDER — PREGABALIN 100 MG/1
100 CAPSULE ORAL 2 TIMES DAILY
Qty: 60 CAPSULE | Refills: 5 | Status: SHIPPED | OUTPATIENT
Start: 2022-03-11 | End: 2022-09-12 | Stop reason: SDUPTHER

## 2022-03-19 PROBLEM — E66.01 SEVERE OBESITY (BMI 35.0-39.9) WITH COMORBIDITY (HCC): Status: ACTIVE | Noted: 2018-03-19

## 2022-03-19 PROBLEM — G47.10 HYPERSOMNOLENCE: Status: ACTIVE | Noted: 2017-07-12

## 2022-03-29 ENCOUNTER — TELEPHONE (OUTPATIENT)
Dept: NEUROLOGY | Age: 57
End: 2022-03-29

## 2022-03-30 DIAGNOSIS — G43.719 CHRONIC MIGRAINE WITHOUT AURA, INTRACTABLE, WITHOUT STATUS MIGRAINOSUS: ICD-10-CM

## 2022-03-30 RX ORDER — GALCANEZUMAB 120 MG/ML
INJECTION, SOLUTION SUBCUTANEOUS
Qty: 2 ML | Refills: 0 | Status: SHIPPED | COMMUNITY
Start: 2022-03-30 | End: 2022-03-31 | Stop reason: SDUPTHER

## 2022-03-31 ENCOUNTER — TELEPHONE (OUTPATIENT)
Dept: NEUROLOGY | Age: 57
End: 2022-03-31

## 2022-03-31 DIAGNOSIS — G43.719 CHRONIC MIGRAINE WITHOUT AURA, INTRACTABLE, WITHOUT STATUS MIGRAINOSUS: ICD-10-CM

## 2022-03-31 RX ORDER — GALCANEZUMAB 120 MG/ML
120 INJECTION, SOLUTION SUBCUTANEOUS
Qty: 3 ML | Refills: 4 | Status: SHIPPED | OUTPATIENT
Start: 2022-03-31 | End: 2022-07-20 | Stop reason: ALTCHOICE

## 2022-04-01 NOTE — TELEPHONE ENCOUNTER
Emgality submitted to [facility] [facility] for EVIE MENJIVAR Case: [id number], Status: Approved, Coverage Starts on: 3/31/2022 12:00:00 AM, Coverage Ends on: 3/31/2023 12:00:00 AM.    Rx E-scribed to [facility] in [place] [phone]

## 2022-04-03 ENCOUNTER — TELEPHONE (OUTPATIENT)
Dept: NEUROLOGY | Age: 57
End: 2022-04-03

## 2022-04-03 NOTE — TELEPHONE ENCOUNTER
Krishna MENJIVAR submitted     Approved today  [place] Case: [id number], Status: Approved, Coverage Starts on: 4/3/2022 12:00:00 AM, Coverage Ends on: 4/3/2023 12:00:00 AM.    E-Prescribing Status: Receipt confirmed by pharmacy (3/8/2022  9:21 PM EST)

## 2022-04-05 ENCOUNTER — PATIENT MESSAGE (OUTPATIENT)
Dept: INTERNAL MEDICINE CLINIC | Age: 57
End: 2022-04-05

## 2022-04-05 DIAGNOSIS — B37.9 YEAST INFECTION: Primary | ICD-10-CM

## 2022-04-05 RX ORDER — FLUCONAZOLE 150 MG/1
150 TABLET ORAL DAILY
Qty: 2 TABLET | Refills: 0 | Status: SHIPPED | OUTPATIENT
Start: 2022-04-05 | End: 2022-04-07

## 2022-04-05 NOTE — TELEPHONE ENCOUNTER
Gisselle Mittal 4/5/2022 3:05 PM EDT      ----- Message -----  From: Jesus Panchal  Sent: 4/5/2022 3:03 PM EDT  To: Betzaida Cagle  Subject: Need prescription for yeast infection     I had my big toenail removed because it got bent backwards and was coming off. The NP I saw prescribed Doxycycline for me to take afterwards. It has caused a yeast infection. I tried to call her to see if she could send a prescription, but all I can get is an answering machine. Is there any way you can call in a prescription to [facility] - [phone] to help me? I'm leaving town tomorrow night going to [employer] races this weekend and I don't want to be miserable the entire time.      Thanks!!

## 2022-04-19 DIAGNOSIS — M62.838 MUSCLE SPASM: ICD-10-CM

## 2022-04-19 DIAGNOSIS — G43.809 OTHER MIGRAINE WITHOUT STATUS MIGRAINOSUS, NOT INTRACTABLE: ICD-10-CM

## 2022-04-19 RX ORDER — BACLOFEN 10 MG/1
TABLET ORAL
Qty: 90 TABLET | Refills: 0 | Status: SHIPPED | OUTPATIENT
Start: 2022-04-19 | End: 2022-05-24

## 2022-04-19 RX ORDER — SUMATRIPTAN 100 MG/1
TABLET, FILM COATED ORAL
Qty: 9 TABLET | Refills: 0 | Status: SHIPPED | OUTPATIENT
Start: 2022-04-19 | End: 2022-05-24

## 2022-05-24 DIAGNOSIS — M62.838 MUSCLE SPASM: ICD-10-CM

## 2022-05-24 DIAGNOSIS — G43.809 OTHER MIGRAINE WITHOUT STATUS MIGRAINOSUS, NOT INTRACTABLE: ICD-10-CM

## 2022-05-24 RX ORDER — SUMATRIPTAN 100 MG/1
TABLET, FILM COATED ORAL
Qty: 9 TABLET | Refills: 0 | Status: SHIPPED | OUTPATIENT
Start: 2022-05-24 | End: 2022-06-20

## 2022-05-24 RX ORDER — BACLOFEN 10 MG/1
TABLET ORAL
Qty: 90 TABLET | Refills: 0 | Status: SHIPPED | OUTPATIENT
Start: 2022-05-24 | End: 2022-06-20

## 2022-05-25 RX ORDER — MONTELUKAST SODIUM 10 MG/1
TABLET ORAL
Qty: 90 TABLET | Refills: 0 | Status: SHIPPED | OUTPATIENT
Start: 2022-05-25 | End: 2022-08-22

## 2022-05-25 RX ORDER — LEVOTHYROXINE SODIUM 75 UG/1
TABLET ORAL
Qty: 30 TABLET | Refills: 3 | Status: SHIPPED | OUTPATIENT
Start: 2022-05-25 | End: 2022-10-20

## 2022-06-20 DIAGNOSIS — G43.809 OTHER MIGRAINE WITHOUT STATUS MIGRAINOSUS, NOT INTRACTABLE: ICD-10-CM

## 2022-06-20 DIAGNOSIS — M62.838 MUSCLE SPASM: ICD-10-CM

## 2022-06-20 RX ORDER — SUMATRIPTAN 100 MG/1
TABLET, FILM COATED ORAL
Qty: 9 TABLET | Refills: 0 | Status: SHIPPED | OUTPATIENT
Start: 2022-06-20

## 2022-06-20 RX ORDER — BACLOFEN 10 MG/1
TABLET ORAL
Qty: 90 TABLET | Refills: 0 | Status: SHIPPED | OUTPATIENT
Start: 2022-06-20 | End: 2022-07-15

## 2022-06-22 ENCOUNTER — OFFICE VISIT (OUTPATIENT)
Dept: INTERNAL MEDICINE CLINIC | Age: 57
End: 2022-06-22
Payer: COMMERCIAL

## 2022-06-22 VITALS
TEMPERATURE: 98.7 F | WEIGHT: 216.6 LBS | BODY MASS INDEX: 36.98 KG/M2 | HEIGHT: 64 IN | SYSTOLIC BLOOD PRESSURE: 139 MMHG | RESPIRATION RATE: 16 BRPM | HEART RATE: 65 BPM | DIASTOLIC BLOOD PRESSURE: 88 MMHG | OXYGEN SATURATION: 95 %

## 2022-06-22 DIAGNOSIS — L30.9 DERMATITIS: ICD-10-CM

## 2022-06-22 DIAGNOSIS — M67.441 GANGLION CYST OF FLEXOR TENDON SHEATH OF FINGER OF RIGHT HAND: Primary | ICD-10-CM

## 2022-06-22 PROCEDURE — 99212 OFFICE O/P EST SF 10 MIN: CPT | Performed by: FAMILY MEDICINE

## 2022-06-22 NOTE — PATIENT INSTRUCTIONS
Ganglions: Care Instructions  Your Care Instructions     A ganglion is a small sac, or cyst, filled with a clear fluid that is like jelly. A ganglion may look like a bump on the hand or wrist. It also can appear on your feet, ankles, knees, or shoulders. It is not cancer. A ganglion can grow out of the protective area, or capsule, around a joint. It also can grow on a tendon sheath, which covers the ropelike tendons that connect muscle to bone. A ganglion may hurt or cause numbness if it presses on a nerve. Many ganglions do not need treatment, and they often go away on their own. But if a ganglion hurts, becomes larger, causes numbness, or limits your activity, your doctor may want to drain it with a needle and syringe or remove it with minor surgery. Follow-up care is a key part of your treatment and safety. Be sure to make and go to all appointments, and call your doctor if you are having problems. It's also a good idea to know your test results and keep a list of the medicines you take. How can you care for yourself at home? · Wear a wrist or finger splint as directed by your doctor. It will keep your wrist or hand from moving and help reduce the fluid in the cyst. This may be all you need for the ganglion to shrink and go away. · Do not smash a ganglion with a book or other heavy object. You may break a bone or otherwise injure your wrist by trying this folk remedy, and the ganglion may return anyway. · Do not try to drain the fluid by poking the ganglion with a pin or any other sharp object. You could cause an infection. When should you call for help? Call your doctor now or seek immediate medical care if:    · You have signs of infection, such as:  ? Increased pain, swelling, warmth, or redness. ? Red streaks leading from the cyst.  ? Pus draining from the cyst.  ? A fever.    Watch closely for changes in your health, and be sure to contact your doctor if:    · You have increasing pain.     · Your ganglion is getting larger.     · You still have pain or numbness from a ganglion. Where can you learn more? Go to http://www.gray.com/  Enter [id number] in the search box to learn more about \"Ganglions: Care Instructions. \"  Current as of: July 1, 2021               Content Version: 13.2  © 4545-3192 [employer]. Care instructions adapted under license by [employer] (which disclaims liability or warranty for this information). If you have questions about a medical condition or this instruction, always ask your healthcare professional. [place] [address] any warranty or liability for your use of this information.

## 2022-06-22 NOTE — PROGRESS NOTES
Sonam Polanco is a 49 y.o. female who presents with cyst right palm on the 4th digit tendon. It is not painful. Somewhat bothersome. Had itchy rash on both lower legs. Seen at [facility], given triamcinolone ointment with relief as needed. Treated for HTN. BP controlled. Past Medical History:   Diagnosis Date    Anxiety     Constipation     Essential hypertension     Fibromyalgia     Headache     Headache(784.0)     Hypothyroidism     Joint pain     Obstructive sleep apnea (adult) (pediatric) 5/16/2014    Sinus problem     Sleep trouble     Stress     Tiredness        Family History   Problem Relation Age of Onset    Diabetes Father     Hypertension Father     Cancer Mother         skin    Cancer Maternal Aunt     Cancer Maternal Uncle     Diabetes Maternal Uncle     Diabetes Paternal Aunt     Diabetes Maternal Grandmother     Parkinsonism Maternal Grandmother     Other Paternal Grandmother         amylodosis       Social History     Socioeconomic History    Marital status:      Spouse name: Not on file    Number of children: Not on file    Years of education: Not on file    Highest education level: Not on file   Occupational History    Not on file   Tobacco Use    Smoking status: Former Smoker    Smokeless tobacco: Never Used   Vaping Use    Vaping Use: Never used   Substance and Sexual Activity    Alcohol use: Yes     Comment: social    Drug use: No    Sexual activity: Not Currently     Partners: Male     Birth control/protection: None   Other Topics Concern    Not on file   Social History Narrative    Not on file     Social Determinants of Health     Financial Resource Strain:     Difficulty of Paying Living Expenses: Not on file   Food Insecurity:     Worried About Running Out of Food in the Last Year: Not on file    Petrona of Food in the Last Year: Not on file   Transportation Needs:     Lack of Transportation (Medical):  Not on file    Lack of Transportation (Non-Medical): Not on file   Physical Activity:     Days of Exercise per Week: Not on file    Minutes of Exercise per Session: Not on file   Stress:     Feeling of Stress : Not on file   Social Connections:     Frequency of Communication with Friends and Family: Not on file    Frequency of Social Gatherings with Friends and Family: Not on file    Attends Sabianist Services: Not on file    Active Member of Clubs or Organizations: Not on file    Attends Club or Organization Meetings: Not on file    Marital Status: Not on file   Intimate Partner Violence:     Fear of Current or Ex-Partner: Not on file    Emotionally Abused: Not on file    Physically Abused: Not on file    Sexually Abused: Not on file   Housing Stability:     Unable to Pay for Housing in the Last Year: Not on file    Number of Places Lived in the Last Year: Not on file    Unstable Housing in the Last Year: Not on file       Current Outpatient Medications on File Prior to Visit   Medication Sig Dispense Refill    baclofen (LIORESAL) 10 mg tablet TAKE 1 TABLET BY MOUTH THREE TIMES DAILY 90 Tablet 0    SUMAtriptan (IMITREX) 100 mg tablet TAKE 1/2-1 TABLET BY MOUTH AT ONSET OF HEADACHE, REPEAT IN 2 HOURS IF NEEDED 9 Tablet 0    montelukast (SINGULAIR) 10 mg tablet TAKE 1 TABLET DAILY 90 Tablet 0    levothyroxine (Synthroid) 75 mcg tablet TAKE 1 TABLET EVERY MORNINGON AN EMPTY STOMACH 30 Tablet 3    Emgality Pen 120 mg/mL injection 1 mL by SubCUTAneous route every thirty (30) days. INJECT 240MG UNDER THE SKIN THE FIRST MONTH, THEN 120MG EVERY MONTH THEREAFTER 3 mL 4    pregabalin (LYRICA) 100 mg capsule Take 1 Capsule by mouth two (2) times a day. 60 Capsule 5    Trokendi XR 50 mg capsule Take 1 Capsule by mouth daily.  30 Capsule 11    DULoxetine (CYMBALTA) 60 mg capsule TAKE 1 CAPSULE DAILY 90 Capsule 3    ALPRAZolam (XANAX) 0.5 mg tablet TAKE 1 TABLET BY MOUTH TWICE DAILY AS NEEDED FOR ANXIETY 30 Tab 0    butalbital-acetaminophen-caffeine (FIORICET, ESGIC) -40 mg per tablet Take 1 Tab by mouth every six (6) hours as needed for Headache. 30 Tab 1    multivitamin (ONE A DAY) tablet Take 1 Tab by mouth daily.  omega-3 fatty acids-vitamin e (FISH OIL) 1,000 mg cap Take 1 Cap by mouth.  [DISCONTINUED] onabotulinumtoxinA (Botox) 200 unit injection FOR . PHYSICIAN TO INJECT UP  UNITS INTRAMUSCULARLY INTO HEAD, FACE AND NECK AREA EVERY 90 DAYS. (Patient not taking: Reported on 11/10/2021) 1 Vial 3    aspirin-acetaminophen-caffeine (EXCEDRIN ES) 250-250-65 mg per tablet Take 2 Tabs by mouth every eight (8) hours as needed. (Patient not taking: Reported on 6/22/2022)       No current facility-administered medications on file prior to visit. Review of Systems  Pertinent items are noted in HPI. Objective:     Visit Vitals  /88 (BP 1 Location: Left upper arm, BP Patient Position: Sitting, BP Cuff Size: Adult)   Pulse 65   Temp 98.7 °F (37.1 °C) (Temporal)   Resp 16   Ht 5' 4\" (1.626 m)   Wt 216 lb 9.6 oz (98.2 kg)   SpO2 95%   BMI 37.18 kg/m²     Gen: well appearing female  Extrem:  +2 pulses, no edema, warm distally, small ganglion cyst palmar surface right hand on the fourth digit tendon, nontender on palpation  Skin: Erythematous patches bilateral lower legs worse on the left, no superinfection      Assessment/Plan:       ICD-10-CM ICD-9-CM    1. Ganglion cyst of flexor tendon sheath of finger of right hand  M67.441 727.42    2. Dermatitis  L30.9 692.9    Patient advised to avoid pressure to the area of the cyst.  Discussed referral to Ortho hand specialist if continues to be bothersome    Recommend routine moisturizer to lower extremity dry patches and use steroid topical regularly to calm rash for up to 2 weeks    Follow-up and Dispositions    · Return if symptoms worsen or fail to improve.          Ian Valdivia MD

## 2022-06-22 NOTE — PROGRESS NOTES
1. \"Have you been to the ER, urgent care clinic since your last visit? Hospitalized since your last visit? \" Yes Where: [facility] Med 6/03/22 Rash lower left leg    2. \"Have you seen or consulted any other health care providers outside of the [address] [place] [address] since your last visit? \" No     3. For patients aged 39-70: Has the patient had a colonoscopy / FIT/ Cologuard? Past due      If the patient is female:    4. For patients aged 41-77: Has the patient had a mammogram within the past 2 years? Yes - no Care Gap present      5. For patients aged 21-65: Has the patient had a pap smear?  Yes - no Care Gap present

## 2022-07-15 DIAGNOSIS — M62.838 MUSCLE SPASM: ICD-10-CM

## 2022-07-15 RX ORDER — BACLOFEN 10 MG/1
TABLET ORAL
Qty: 90 TABLET | Refills: 0 | Status: SHIPPED | OUTPATIENT
Start: 2022-07-15 | End: 2022-08-08

## 2022-07-20 ENCOUNTER — OFFICE VISIT (OUTPATIENT)
Dept: NEUROLOGY | Age: 57
End: 2022-07-20
Payer: COMMERCIAL

## 2022-07-20 VITALS
TEMPERATURE: 97.9 F | OXYGEN SATURATION: 97 % | HEART RATE: 66 BPM | HEIGHT: 64 IN | DIASTOLIC BLOOD PRESSURE: 80 MMHG | RESPIRATION RATE: 16 BRPM | BODY MASS INDEX: 36.91 KG/M2 | SYSTOLIC BLOOD PRESSURE: 130 MMHG | WEIGHT: 216.2 LBS

## 2022-07-20 DIAGNOSIS — G47.00 INSOMNIA, UNSPECIFIED TYPE: ICD-10-CM

## 2022-07-20 DIAGNOSIS — M79.7 FIBROMYALGIA: ICD-10-CM

## 2022-07-20 DIAGNOSIS — F41.9 ANXIETY: ICD-10-CM

## 2022-07-20 DIAGNOSIS — G43.719 CHRONIC MIGRAINE WITHOUT AURA, INTRACTABLE, WITHOUT STATUS MIGRAINOSUS: Primary | ICD-10-CM

## 2022-07-20 PROCEDURE — 99214 OFFICE O/P EST MOD 30 MIN: CPT | Performed by: NURSE PRACTITIONER

## 2022-07-20 RX ORDER — ATOGEPANT 60 MG/1
60 TABLET ORAL DAILY
Qty: 30 TABLET | Refills: 5 | Status: SHIPPED | OUTPATIENT
Start: 2022-07-20

## 2022-07-20 RX ORDER — PREDNISONE 5 MG/1
TABLET ORAL
Qty: 21 TABLET | Refills: 0 | Status: SHIPPED | OUTPATIENT
Start: 2022-07-20

## 2022-07-20 NOTE — PROGRESS NOTES
Date:  22    Name:  Duke Sutherland  :  1965  MRN:  [id number]       REQUESTING PHYSICIAN:  Ilsa Lowry MD    Chief Complaint   Patient presents with    Follow-up     Last seen by Dr Faraz Asher 21 Chronic migraine without aura, intractable, without status migrainosus         HISTORY OF PRESENT ILLNESS:     Patient presents today for Headaches migraines, Last seen 2021:  Overall they seem much better than they were in years ago. They are less severe, now she is able to function through them. Still miserable at times. Still c/o daily persistent headache, that just never goes away. Pt is taking more Tylenol, that worries her. Emgality: didn't do it last month, insurance starting charging her more. Patient is unsure if it was helping her much anyway. Patient presents today wanting to know what her options are    Suffering migraines for over 10 years  Migraines lasted four hours or more and were associated with photophobia and nausea. She notes she has daily headaches. Medications tried  Botox  Emgality  NSAIDs: Advil Aleve  Antidepressants: Cymbalta Prozac Celexa Zoloft Effexor  Antiepileptics tried: Topamax Lyrica amitriptyline nortriptyline Neurontin Depakote  Others tried: Excedrin, Tylenol, Fioricet, Fiorinal, Ultracet, Ultram, baclofen, Tylenol  Triptans tried: Zomig, Relpax, Imitrex, Maxalt, Axert    Headache Characteristics:  Dull, ache, stabbing pain, pulsating at times. Ice pick. Location of the headache: top of the head, behind the ears.   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 Exercise:1 x week, tries to do some HEP. Review of Systems   General Constitional ROS:   Psychological ROS: c/o a lot of anxiety. better with the CBD  ENT ROS: Negative. Endocrine ROS: Negative. Respiratory ROS: Negative. Cardiovascular ROS: Negative  Gastrointestinal ROS: Negative. Genito-Urinary ROS: Negative. Bladder sling, having some incontinence. Musculoskeletal ROS: C/o fibromyalgia pains  Neurological ROS: C/o headache. Complains of tingling related to    C/o alot dizziness recently, dramamine helps. No falls. Current Outpatient Medications   Medication Sig    baclofen (LIORESAL) 10 mg tablet TAKE 1 TABLET BY MOUTH THREE TIMES DAILY    SUMAtriptan (IMITREX) 100 mg tablet TAKE 1/2-1 TABLET BY MOUTH AT ONSET OF HEADACHE, REPEAT IN 2 HOURS IF NEEDED    montelukast (SINGULAIR) 10 mg tablet TAKE 1 TABLET DAILY    levothyroxine (Synthroid) 75 mcg tablet TAKE 1 TABLET EVERY MORNINGON AN EMPTY STOMACH    pregabalin (LYRICA) 100 mg capsule Take 1 Capsule by mouth two (2) times a day. Trokendi XR 50 mg capsule Take 1 Capsule by mouth daily. DULoxetine (CYMBALTA) 60 mg capsule TAKE 1 CAPSULE DAILY    ALPRAZolam (XANAX) 0.5 mg tablet TAKE 1 TABLET BY MOUTH TWICE DAILY AS NEEDED FOR ANXIETY    butalbital-acetaminophen-caffeine (FIORICET, ESGIC) -40 mg per tablet Take 1 Tab by mouth every six (6) hours as needed for Headache.    multivitamin (ONE A DAY) tablet Take 1 Tab by mouth daily. omega-3 fatty acids-vitamin e 1,000 mg cap Take 1 Cap by mouth. Emgality Pen 120 mg/mL injection 1 mL by SubCUTAneous route every thirty (30) days. INJECT 240MG UNDER THE SKIN THE FIRST MONTH, THEN 120MG EVERY MONTH THEREAFTER (Patient not taking: Reported on 7/20/2022)    aspirin-acetaminophen-caffeine (EXCEDRIN ES) 250-250-65 mg per tablet Take 2 Tabs by mouth every eight (8) hours as needed.  (Patient not taking: No sig reported)     No current facility-administered medications for this visit. Allergies   Allergen Reactions    Monistat 1 [Tioconazole] Swelling    Onion Diarrhea    Prozac [Fluoxetine] Other (comments)     \"felt like having a heart attack\"    Sulfa (Sulfonamide Antibiotics) Nausea Only    Sulfa Dyne Nausea and Vomiting     Past Medical History:   Diagnosis Date    Anxiety     Constipation     Essential hypertension     Fibromyalgia     Headache     Headache(784.0)     Hypothyroidism     Joint pain     Obstructive sleep apnea (adult) (pediatric) 2014    Sinus problem     Sleep trouble     Stress     Tiredness      Past Surgical History:   Procedure Laterality Date    HX BLADDER SUSPENSION  2009    HX GYN  1988        HX GYN  2009    ablation    HX HYSTERECTOMY      CHRIS/BSO bleeding.      HX ORTHOPAEDIC      HX TUBAL LIGATION       Social History     Socioeconomic History    Marital status:      Spouse name: Not on file    Number of children: Not on file    Years of education: Not on file    Highest education level: Not on file   Occupational History    Not on file   Tobacco Use    Smoking status: Former    Smokeless tobacco: Never   Vaping Use    Vaping Use: Never used   Substance and Sexual Activity    Alcohol use: Yes     Comment: social    Drug use: No    Sexual activity: Not Currently     Partners: Male     Birth control/protection: None   Other Topics Concern    Not on file   Social History Narrative    Not on file     Social Determinants of Health     Financial Resource Strain: Not on file   Food Insecurity: Not on file   Transportation Needs: Not on file   Physical Activity: Not on file   Stress: Not on file   Social Connections: Not on file   Intimate Partner Violence: Not on file   Housing Stability: Not on file     Family History   Problem Relation Age of Onset    Diabetes Father     Hypertension Father     Cancer Mother         skin    Cancer Maternal Aunt     Cancer Maternal Uncle     Diabetes Maternal Uncle     Diabetes Paternal Aunt     Diabetes Maternal Grandmother     Parkinsonism Maternal Grandmother     Other Paternal Grandmother         amylodosis     MRI Results (most recent):  Results from Hospital Encounter encounter on 03/30/16    MRA BRAIN WO CONT    Narrative  **Final Report**      ICD Codes / Adm. Diagnosis: 33749493  Z12.39 / Syncope  Syncope  Examination:  MR HEAD MRA WO CON  - [id number] - Mar 30 2016  7:17PM  Accession No:  [id number]  Reason:  CVA      REPORT:  INDICATION: vertigo, syncope, dysequilibrium    Multiplanar noncontrast MRA of the [place] is performed with time  of flight imaging. The vessels comprising the [place] demonstrate classic anatomy. There is no significant stenosis, occlusion, vascular malformation or  aneurysm. Impression  : Normal MRA of the [place]. Signing/Reading Doctor: JELLY Gallegos ([id number])  Approved: JELLY GRAHAM ([id number])  Mar 30 2016  7:20PM         PHYSICAL EXAMINATION:    Visit Vitals  /80 (BP 1 Location: Right upper arm, BP Patient Position: Sitting, BP Cuff Size: Large adult)   Pulse 66   Temp 97.9 °F (36.6 °C) (Tympanic)   Resp 16   Ht 5' 4\" (1.626 m)   Wt 216 lb 3.2 oz (98.1 kg)   SpO2 97%   BMI 37.11 kg/m²     General:  Well defined, nourished, and groomed individual in no acute distress. Neck: Supple, nontender, normal ROM noted. Musculoskeletal:  Extremities revealed no edema and had full range of motion of joints. Psych:  Good mood and bright affect    NEUROLOGICAL EXAMINATION:     Mental Status:   Alert and oriented to person, place, and time with recent and remote memory intact. Attention span and concentration are normal. Speech is fluent with a full fund of knowledge. Cranial Nerves:    II, III, IV, VI:  Visual acuity grossly intact. Visual fields are normal.    Pupils are equal, round, and reactive to light and accommodation.     Extra-ocular movements are full and fluid. No nystagmus. V-XII: Hearing is grossly intact. Facial features are symmetric, with normal sensation and strength. The palate rises symmetrically and the tongue protrudes midline. Sternocleidomastoids 5/5. Motor Examination: Normal tone, 5/5 muscle strength in BUE. Negative pronator drift. No resting or intention tremor. Sensory exam:  Normal sensation to light touch intact in BUE at all dermatomes. Coordination:   Finger to nose and rapid arm movement testing was normal.      Gait and Station:  Steady gait. Normal arm swing. Reflexes:  DTRs 2+ throughout. ASSESSMENT AND PLAN    ICD-10-CM ICD-9-CM    1. Chronic migraine without aura, intractable, without status migrainosus  G43.719 346.71 atogepant (Qulipta) 60 mg tab      predniSONE (STERAPRED) 5 mg dose pack      2. Fibromyalgia  M79.7 729.1       3. Insomnia, unspecified type  G47.00 780.52       4. Anxiety  F41.9 300.00         1. Chronic migraine without aura, intractable, without status migrainosus: Due to lack of efficacy as well as insurance issues we will discontinue the Emgality. I believe patient is also suffering from some rebound headaches, we will do a Sterapred Dosepak to help break up this migraine. We will initiate the patient on Quilipta 60mg, she is to take this daily. Patient is to continue working on her lifestyle which includes a good sleep-wake cycle, increased hydration, increase exercise, as well as improve diet. She is notify me how she is doing. We will patient is try to limit the Tylenol.  -     atogepant (Qulipta) 60 mg tab; Take 60 mg by mouth daily. , Normal, Disp-30 Tablet, R-5  -     predniSONE (STERAPRED) 5 mg dose pack; See administration instruction per 5mg dose pack, Normal, Disp-21 Tablet, R-0  2. Fibromyalgia: healthy lifestyle encouraged which includes increased activity/exercise, good sleep-wake cycle, continue regular medications. 3. Insomnia, unspecified type:  Improved with CBD, sleep hygiene encouraged. 4. Anxiety: Discussed benefits of regular exercise, decreasing stressors etc.      Patient and/or family verbalized understanding of all instructions. Safety/side effects of medications discussed. Patient remains a complex patient secondary to polypharmacy, significant comorbid conditions, and use of multiple medications which complicate the decision making process related to patient's neurologic diagnosis.         Philippe Dempsey, FAY-BC

## 2022-07-20 NOTE — PROGRESS NOTES
Chief Complaint   Patient presents with    Follow-up     Last seen by Dr Mell Hammond 2/9/21 Chronic migraine without aura, intractable, without status migrainosus     1. Have you been to the ER, urgent care clinic since your last visit? Yes  Hospitalized since your last visit? NO    2. Have you seen or consulted any other health care providers outside of the [address] [place] [address] since your last visit? NO Include any pap smears or colon screening.  NO    Patient C/O Headache all day every day (cluster tension type)

## 2022-08-08 DIAGNOSIS — M62.838 MUSCLE SPASM: ICD-10-CM

## 2022-08-08 RX ORDER — BACLOFEN 10 MG/1
TABLET ORAL
Qty: 90 TABLET | Refills: 0 | Status: SHIPPED | OUTPATIENT
Start: 2022-08-08 | End: 2022-09-01

## 2022-08-22 RX ORDER — MONTELUKAST SODIUM 10 MG/1
TABLET ORAL
Qty: 90 TABLET | Refills: 0 | Status: SHIPPED | OUTPATIENT
Start: 2022-08-22

## 2022-09-01 DIAGNOSIS — M62.838 MUSCLE SPASM: ICD-10-CM

## 2022-09-01 RX ORDER — BACLOFEN 10 MG/1
TABLET ORAL
Qty: 90 TABLET | Refills: 0 | Status: SHIPPED | OUTPATIENT
Start: 2022-09-01 | End: 2022-10-23

## 2022-09-12 DIAGNOSIS — M79.7 FIBROMYALGIA: ICD-10-CM

## 2022-09-12 RX ORDER — PREGABALIN 100 MG/1
100 CAPSULE ORAL 2 TIMES DAILY
Qty: 60 CAPSULE | Refills: 5 | Status: SHIPPED | OUTPATIENT
Start: 2022-09-12

## 2022-09-12 NOTE — TELEPHONE ENCOUNTER
Received fax for lyrica refill her last visit was 7/20/2022  Next visit 11/23/2022    Will send to dr Wallace Galvan

## 2022-10-10 ENCOUNTER — TRANSCRIBE ORDER (OUTPATIENT)
Dept: SCHEDULING | Age: 57
End: 2022-10-10

## 2022-10-10 DIAGNOSIS — Z12.31 VISIT FOR SCREENING MAMMOGRAM: Primary | ICD-10-CM

## 2022-10-15 ENCOUNTER — HOSPITAL ENCOUNTER (OUTPATIENT)
Dept: MAMMOGRAPHY | Age: 57
Discharge: HOME | End: 2022-10-15
Attending: FAMILY MEDICINE
Payer: COMMERCIAL

## 2022-10-15 DIAGNOSIS — Z12.31 VISIT FOR SCREENING MAMMOGRAM: ICD-10-CM

## 2022-10-15 PROCEDURE — 77063 BREAST TOMOSYNTHESIS BI: CPT

## 2022-10-20 RX ORDER — LEVOTHYROXINE SODIUM 75 UG/1
TABLET ORAL
Qty: 30 TABLET | Refills: 3 | Status: SHIPPED | OUTPATIENT
Start: 2022-10-20 | End: 2022-10-28 | Stop reason: SDUPTHER

## 2022-10-23 DIAGNOSIS — M62.838 MUSCLE SPASM: ICD-10-CM

## 2022-10-23 RX ORDER — BACLOFEN 10 MG/1
TABLET ORAL
Qty: 90 TABLET | Refills: 0 | Status: SHIPPED | OUTPATIENT
Start: 2022-10-23

## 2022-10-28 RX ORDER — LEVOTHYROXINE SODIUM 75 UG/1
TABLET ORAL
Qty: 30 TABLET | Refills: 3 | Status: SHIPPED | OUTPATIENT
Start: 2022-10-28

## 2022-10-28 NOTE — TELEPHONE ENCOUNTER
Future Appointments:  Future Appointments   Date Time Provider Angelique Jennifer   11/23/2022  8:00 AM Stepan Zarate, NP NEUM BS AMB        Last Appointment With Me:  6/22/2022     Requested Prescriptions     Pending Prescriptions Disp Refills    levothyroxine (Synthroid) 75 mcg tablet 30 Tablet 3     Sig: TAKE 1 TABLET EVERY MORNINGON AN EMPTY STOMACH

## 2022-11-13 RX ORDER — MONTELUKAST SODIUM 10 MG/1
TABLET ORAL
Qty: 90 TABLET | Refills: 0 | Status: SHIPPED | OUTPATIENT
Start: 2022-11-13

## 2022-11-16 DIAGNOSIS — M62.838 MUSCLE SPASM: ICD-10-CM

## 2022-11-16 RX ORDER — BACLOFEN 10 MG/1
TABLET ORAL
Qty: 90 TABLET | Refills: 0 | Status: SHIPPED | OUTPATIENT
Start: 2022-11-16

## 2022-11-23 ENCOUNTER — OFFICE VISIT (OUTPATIENT)
Dept: NEUROLOGY | Age: 57
End: 2022-11-23
Payer: COMMERCIAL

## 2022-11-23 VITALS
RESPIRATION RATE: 16 BRPM | SYSTOLIC BLOOD PRESSURE: 140 MMHG | TEMPERATURE: 98 F | BODY MASS INDEX: 33.87 KG/M2 | OXYGEN SATURATION: 98 % | DIASTOLIC BLOOD PRESSURE: 80 MMHG | HEART RATE: 64 BPM | WEIGHT: 198.4 LBS | HEIGHT: 64 IN

## 2022-11-23 DIAGNOSIS — G43.719 CHRONIC MIGRAINE WITHOUT AURA, INTRACTABLE, WITHOUT STATUS MIGRAINOSUS: Primary | ICD-10-CM

## 2022-11-23 DIAGNOSIS — M79.7 FIBROMYALGIA: ICD-10-CM

## 2022-11-23 DIAGNOSIS — I10 ESSENTIAL HYPERTENSION: ICD-10-CM

## 2022-11-23 PROCEDURE — 3078F DIAST BP <80 MM HG: CPT | Performed by: NURSE PRACTITIONER

## 2022-11-23 PROCEDURE — 3074F SYST BP LT 130 MM HG: CPT | Performed by: NURSE PRACTITIONER

## 2022-11-23 PROCEDURE — 99214 OFFICE O/P EST MOD 30 MIN: CPT | Performed by: NURSE PRACTITIONER

## 2022-11-23 RX ORDER — BUTALBITAL, ACETAMINOPHEN AND CAFFEINE 50; 325; 40 MG/1; MG/1; MG/1
1 TABLET ORAL
Qty: 30 TABLET | Refills: 1 | Status: SHIPPED | OUTPATIENT
Start: 2022-11-23

## 2022-11-23 RX ORDER — BUTALBITAL, ACETAMINOPHEN AND CAFFEINE 50; 325; 40 MG/1; MG/1; MG/1
1 TABLET ORAL
Qty: 30 TABLET | Refills: 1 | Status: SHIPPED | OUTPATIENT
Start: 2022-11-23 | End: 2022-11-23 | Stop reason: SDUPTHER

## 2022-11-23 NOTE — PROGRESS NOTES
[facility]  [address]  Francis [place]  Tel: [phone]  Fax: [phone]      Date:  22     Name:  Silke Richmond  :  1965  MRN:  [id number]     PCP:  Kandy Salomon MD    Chief Complaint   Patient presents with    Follow-up     Chronic migraine without aura, intractable, without status migrainosus          HISTORY OF PRESENT ILLNESS:  Patient presents today for 4 month follow up. She is doing okay, she notes her migraines are better. Only has had a handful of migraines since last visit, drastic improvement. Seems to be doing well with the Lillie Maudlin. She notes no side effects. The daily headaches persist, they are just there. Some days they are ok and she can manage, and some days its the front of the head. Will take Tylenol, if that doesn't help, she will take the Imitrex, then she will take the Fioricet, if that doesn't help she will go to sleep. Patient is currently not sleeping well, however she notes that the puppy is keeping her up. Non-smoker, no regular exercise. Has elevated aches migraine she denies any new issues or problems. Recap from last visit 2022: 1. Chronic migraine without aura, intractable, without status migrainosus: Due to lack of efficacy as well as insurance issues we will discontinue the Emgality. I believe patient is also suffering from some rebound headaches, we will do a Sterapred Dosepak to help break up this migraine. We will initiate the patient on Quilipta 60mg, she is to take this daily. Patient is to continue working on her lifestyle which includes a good sleep-wake cycle, increased hydration, increase exercise, as well as improve diet. She is notify me how she is doing. We will patient is try to limit the Tylenol.  -     atogepant (Qulipta) 60 mg tab; Take 60 mg by mouth daily. , Normal, Disp-30 Tablet, R-5  -     predniSONE (STERAPRED) 5 mg dose pack; See administration instruction per 5mg dose pack, Normal, Disp-21 Tablet, R-0  2. Fibromyalgia: healthy lifestyle encouraged which includes increased activity/exercise, good sleep-wake cycle, continue regular medications. 3. Insomnia, unspecified type: Improved with CBD, sleep hygiene encouraged. 4. Anxiety: Discussed benefits of regular exercise, decreasing stressors etc.    REVIEW OF SYSTEMS:     Review of Systems   Eyes:  Positive for blurred vision (focus is off at times) and photophobia. Gastrointestinal:  Positive for nausea. Negative for vomiting. Musculoskeletal:  Negative for falls. Neurological:  Positive for dizziness (occasionally will get vertigo) and headaches. Negative for tingling and tremors. Psychiatric/Behavioral:  Negative for depression. The patient has insomnia. All other systems reviewed and are negative. Current Outpatient Medications   Medication Sig    topiramate ER (Trokendi XR) 100 mg capsule Take 1 Capsule by mouth daily. topiramate ER (Trokendi XR) 100 mg capsule Take 1 Capsule by mouth daily. butalbital-acetaminophen-caffeine (FIORICET, ESGIC) -40 mg per tablet Take 1 Tablet by mouth every six (6) hours as needed for Headache.    baclofen (LIORESAL) 10 mg tablet TAKE 1 TABLET BY MOUTH THREE TIMES DAILY    montelukast (SINGULAIR) 10 mg tablet TAKE 1 TABLET DAILY    levothyroxine (Synthroid) 75 mcg tablet TAKE 1 TABLET EVERY MORNINGON AN EMPTY STOMACH    pregabalin (LYRICA) 100 mg capsule Take 1 Capsule by mouth two (2) times a day. atogepant (Qulipta) 60 mg tab Take 60 mg by mouth daily. SUMAtriptan (IMITREX) 100 mg tablet TAKE 1/2-1 TABLET BY MOUTH AT ONSET OF HEADACHE, REPEAT IN 2 HOURS IF NEEDED    DULoxetine (CYMBALTA) 60 mg capsule TAKE 1 CAPSULE DAILY    ALPRAZolam (XANAX) 0.5 mg tablet TAKE 1 TABLET BY MOUTH TWICE DAILY AS NEEDED FOR ANXIETY    multivitamin (ONE A DAY) tablet Take 1 Tab by mouth daily. omega-3 fatty acids-vitamin e 1,000 mg cap Take 1 Cap by mouth.      No current facility-administered medications for this visit. Allergies   Allergen Reactions    Monistat 1 [Tioconazole] Swelling    Onion Diarrhea    Prozac [Fluoxetine] Other (comments)     \"felt like having a heart attack\"    Sulfa (Sulfonamide Antibiotics) Nausea Only    Sulfa Dyne Nausea and Vomiting     Past Medical History:   Diagnosis Date    Anxiety     Constipation     Essential hypertension     Fibromyalgia     Headache     Headache(784.0)     Hypothyroidism     Joint pain     Obstructive sleep apnea (adult) (pediatric) 2014    Sinus problem     Sleep trouble     Stress     Tiredness      Past Surgical History:   Procedure Laterality Date    HX BLADDER SUSPENSION  2009    HX GYN  1988        HX GYN  2009    ablation    HX HYSTERECTOMY  2011    CHRIS/BSO bleeding.      HX ORTHOPAEDIC      HX TUBAL LIGATION  1989    MT REMV/REVS SLING FOR STRES INCONTINENCE  2022     Social History     Socioeconomic History    Marital status:      Spouse name: Not on file    Number of children: Not on file    Years of education: Not on file    Highest education level: Not on file   Occupational History    Not on file   Tobacco Use    Smoking status: Former    Smokeless tobacco: Never   Vaping Use    Vaping Use: Never used   Substance and Sexual Activity    Alcohol use: Yes     Comment: social    Drug use: No    Sexual activity: Not Currently     Partners: Male     Birth control/protection: None   Other Topics Concern    Not on file   Social History Narrative    Not on file     Social Determinants of Health     Financial Resource Strain: Not on file   Food Insecurity: Not on file   Transportation Needs: Not on file   Physical Activity: Not on file   Stress: Not on file   Social Connections: Not on file   Intimate Partner Violence: Not on file   Housing Stability: Not on file     Family History   Problem Relation Age of Onset    Diabetes Father     Hypertension Father     Cancer Mother         skin    Cancer Maternal Aunt     Cancer Maternal Uncle     Diabetes Maternal Uncle     Diabetes Paternal Aunt     Diabetes Maternal Grandmother     Parkinsonism Maternal Grandmother     Other Paternal Grandmother         amylodosis         PHYSICAL EXAMINATION:    Visit Vitals  BP (!) 140/80 (BP 1 Location: Left upper arm, BP Patient Position: Sitting, BP Cuff Size: Large adult)   Pulse 64   Temp 98 °F (36.7 °C) (Tympanic)   Resp 16   Ht 5' 4\" (1.626 m)   Wt 198 lb 6.4 oz (90 kg)   SpO2 98%   BMI 34.06 kg/m²     General:  Well defined, nourished, and well groomed individual in no acute distress. Musculoskeletal:  Extremities revealed no edema and had full range of motion of joints. Psych:  Good mood and bright affect    NEUROLOGICAL EXAMINATION:     Mental Status:   Alert and oriented to person, place, and time with recent and remote memory intact. Attention span and concentration are normal. Clear speech. Fund of knowledge preserved. Cranial Nerves:   PERRLA. Visual fields were full  EOM: no evidence of nystagmus  Facial sensation:  normal and symmetric  Facial motor: normal and symmetric, no facial droop noted. Hearing intact  SCM strength intact  Tongue: midline without fasciculations    Motor Examination: Normal tone. 5/5 muscle strength in bilateral upper extremities. No muscle wasting, no twitching or fasciculation noted. Sensory exam:  Normal throughout to light touch in bilateral upper extremities. Coordination:   Finger to nose and rapid arm movement testing was normal.  No resting or intention tremor. Negative Romberg, negative pronator drift. Gait and Station:  Steady gait. Reflexes:  DTRs 2+ in bilateral biceps, brachioradialis, patella. ASSESSMENT AND PLAN      ICD-10-CM ICD-9-CM    1.  Chronic migraine without aura, intractable, without status migrainosus  G43.719 346.71 topiramate ER (Trokendi XR) 100 mg capsule      topiramate ER (Trokendi XR) 100 mg capsule      butalbital-acetaminophen-caffeine (FIORICET, ESGIC) -40 mg per tablet      DISCONTINUED: butalbital-acetaminophen-caffeine (FIORICET, ESGIC) -40 mg per tablet      2. Essential hypertension  I10 401.9       3. Fibromyalgia  M79.7 729.1         1. Chronic migraine without aura, intractable, without status migrainosus: Patient notes improvements with her migraines with the  Donna Espinosa, she is tocontinue 60 mg daily. I have increased her Trokendi from 50 mg daily to 100 mg daily in hopes that will help with her chronic daily headaches. Patient to limit over-the-counter use, continue with Imitrex as need be as well Fioricet PRN, limiting frequency of use. Healthy lifestyle encouraged. -     topiramate ER (Trokendi XR) 100 mg capsule; Take 1 Capsule by mouth daily. , Normal, Disp-30 Capsule, R-3  -     topiramate ER (Trokendi XR) 100 mg capsule; Take 1 Capsule by mouth daily. , Print, Disp-90 Capsule, R-0  -     butalbital-acetaminophen-caffeine (FIORICET, ESGIC) -40 mg per tablet; Take 1 Tablet by mouth every six (6) hours as needed for Headache., Print, Disp-30 Tablet, R-1  2. Essential hypertension: Healthy lifestyle encouraged, continue follow-up with primary care for ongoing management and monitoring. 3. Fibromyalgia: Continue medications as prescribed, discussed the importance of healthy lifestyle, regular exercise, good sleep-wake cycle. Patient and/or family verbalized understand of all instructions and all questions/concerns were addressed. Safety/side effects of medications discussed. I will see the patient back in approximately 6 to 9 months, sooner if needed.     Deniz Angela, JEAN-PIERRE-BC

## 2022-11-23 NOTE — PROGRESS NOTES
Chief Complaint   Patient presents with    Follow-up     Chronic migraine without aura, intractable, without status migrainosus        1. Have you been to the ER, urgent care clinic since your last visit? No  Hospitalized since your last visit? No    2. Have you seen or consulted any other health care providers outside of the [address] [place] [address] since your last visit? Yes Urology Include any pap smears or colon screening. No    Patient C/O headaches every day.

## 2022-12-12 DIAGNOSIS — M62.838 MUSCLE SPASM: ICD-10-CM

## 2022-12-12 RX ORDER — BACLOFEN 10 MG/1
TABLET ORAL
Qty: 90 TABLET | Refills: 0 | Status: SHIPPED | OUTPATIENT
Start: 2022-12-12

## 2023-01-17 RX ORDER — DULOXETIN HYDROCHLORIDE 60 MG/1
CAPSULE, DELAYED RELEASE ORAL
Qty: 90 CAPSULE | Refills: 3 | Status: SHIPPED | OUTPATIENT
Start: 2023-01-17

## 2023-01-17 NOTE — TELEPHONE ENCOUNTER
Future Appointments:  Future Appointments   Date Time Provider Angelique Rodriguez   2023  8:30 AM Maribel Zarate, NATHANAEL NEUM BS AMB        Last Appointment With Me:  Visit date not found     Requested Prescriptions     Pending Prescriptions Disp Refills    DULoxetine (CYMBALTA) 60 mg capsule 90 Capsule 3     Si Capsule daily.

## 2023-04-26 ENCOUNTER — TRANSCRIBE ORDER (OUTPATIENT)
Dept: SCHEDULING | Age: 58
End: 2023-04-26

## 2023-04-26 DIAGNOSIS — N18.31 CHRONIC KIDNEY DISEASE, STAGE 3A (HCC): Primary | ICD-10-CM

## 2023-05-01 ENCOUNTER — TRANSCRIBE ORDERS (OUTPATIENT)
Facility: HOSPITAL | Age: 58
End: 2023-05-01

## 2023-05-01 DIAGNOSIS — N18.31 CHRONIC KIDNEY DISEASE, STAGE 3A (HCC): Primary | ICD-10-CM

## 2023-05-10 ENCOUNTER — HOSPITAL ENCOUNTER (OUTPATIENT)
Facility: HOSPITAL | Age: 58
Discharge: HOME | End: 2023-05-13
Payer: COMMERCIAL

## 2023-05-10 DIAGNOSIS — N18.31 CHRONIC KIDNEY DISEASE, STAGE 3A (HCC): ICD-10-CM

## 2023-05-10 PROCEDURE — 76770 US EXAM ABDO BACK WALL COMP: CPT

## 2023-09-25 ENCOUNTER — TRANSCRIBE ORDERS (OUTPATIENT)
Facility: HOSPITAL | Age: 58
End: 2023-09-25

## 2023-09-25 DIAGNOSIS — Z12.31 VISIT FOR SCREENING MAMMOGRAM: Primary | ICD-10-CM

## 2023-10-07 NOTE — TELEPHONE ENCOUNTER
Left message for patient to call speciality pharmacy to have botox delivered on mobile phone  Unable to leave message on home phone , mail box full
24

## 2023-10-18 ENCOUNTER — HOSPITAL ENCOUNTER (OUTPATIENT)
Facility: HOSPITAL | Age: 58
Discharge: HOME | End: 2023-10-21
Payer: COMMERCIAL

## 2023-10-18 VITALS — BODY MASS INDEX: 33.8 KG/M2 | WEIGHT: 198 LBS | HEIGHT: 64 IN

## 2023-10-18 DIAGNOSIS — Z12.31 VISIT FOR SCREENING MAMMOGRAM: ICD-10-CM

## 2023-10-18 PROCEDURE — 77063 BREAST TOMOSYNTHESIS BI: CPT
